# Patient Record
Sex: FEMALE | Race: WHITE | NOT HISPANIC OR LATINO | Employment: FULL TIME | URBAN - METROPOLITAN AREA
[De-identification: names, ages, dates, MRNs, and addresses within clinical notes are randomized per-mention and may not be internally consistent; named-entity substitution may affect disease eponyms.]

---

## 2017-03-30 RX ORDER — HYDROCHLOROTHIAZIDE 25 MG/1
25 TABLET ORAL
Qty: 90 TAB | Refills: 0 | Status: SHIPPED | OUTPATIENT
Start: 2017-03-30 | End: 2017-06-27 | Stop reason: SDUPTHER

## 2017-04-20 PROBLEM — K63.89 MELANOSIS COLI: Status: ACTIVE | Noted: 2017-04-20

## 2017-05-24 ENCOUNTER — OFFICE VISIT (OUTPATIENT)
Dept: MEDICAL GROUP | Facility: MEDICAL CENTER | Age: 57
End: 2017-05-24
Attending: NURSE PRACTITIONER
Payer: MEDICAID

## 2017-05-24 VITALS
DIASTOLIC BLOOD PRESSURE: 88 MMHG | RESPIRATION RATE: 20 BRPM | TEMPERATURE: 96.9 F | HEIGHT: 63 IN | OXYGEN SATURATION: 93 % | WEIGHT: 176 LBS | SYSTOLIC BLOOD PRESSURE: 124 MMHG | HEART RATE: 88 BPM | BODY MASS INDEX: 31.18 KG/M2

## 2017-05-24 DIAGNOSIS — I10 ESSENTIAL HYPERTENSION: ICD-10-CM

## 2017-05-24 DIAGNOSIS — M54.2 CHRONIC NECK PAIN: ICD-10-CM

## 2017-05-24 DIAGNOSIS — F17.200 TOBACCO DEPENDENCE: ICD-10-CM

## 2017-05-24 DIAGNOSIS — M85.80 OSTEOPENIA, UNSPECIFIED LOCATION: ICD-10-CM

## 2017-05-24 DIAGNOSIS — E66.9 OBESITY (BMI 30-39.9): ICD-10-CM

## 2017-05-24 DIAGNOSIS — G89.29 CHRONIC MIDLINE LOW BACK PAIN WITHOUT SCIATICA: ICD-10-CM

## 2017-05-24 DIAGNOSIS — Z13.220 NEED FOR LIPID SCREENING: ICD-10-CM

## 2017-05-24 DIAGNOSIS — R63.5 WEIGHT GAIN: ICD-10-CM

## 2017-05-24 DIAGNOSIS — G89.29 CHRONIC NECK PAIN: ICD-10-CM

## 2017-05-24 DIAGNOSIS — F31.60 BIPOLAR AFFECTIVE DISORDER, CURRENT EPISODE MIXED, CURRENT EPISODE SEVERITY UNSPECIFIED (HCC): ICD-10-CM

## 2017-05-24 DIAGNOSIS — M54.50 CHRONIC MIDLINE LOW BACK PAIN WITHOUT SCIATICA: ICD-10-CM

## 2017-05-24 PROCEDURE — 99213 OFFICE O/P EST LOW 20 MIN: CPT | Performed by: NURSE PRACTITIONER

## 2017-05-24 PROCEDURE — 99214 OFFICE O/P EST MOD 30 MIN: CPT | Performed by: NURSE PRACTITIONER

## 2017-05-24 ASSESSMENT — PAIN SCALES - GENERAL: PAINLEVEL: 5=MODERATE PAIN

## 2017-05-24 NOTE — ASSESSMENT & PLAN NOTE
She has chronic neck and low back pain and continues on Methadone per Dr. Melo. She states that her dose has been titrated down to 110 mg and she hopes to get back down to 100 mg daily. She states this is managing her pain well.

## 2017-05-24 NOTE — ASSESSMENT & PLAN NOTE
She has known bipolar and states that her mood has been stable. She continues to be managed by CHRISTINA Luciano and is seeing a therapist and doing groups at [facility]. She states her medications are unchanged.

## 2017-05-24 NOTE — ASSESSMENT & PLAN NOTE
She has known HTN. BP is controlled in the clinic today. The patient denies any episodes of CP, palpitations, SOB, lightheadedness/dizziness, headache or blurred vision. She continues on HCTZ daily without side effects.

## 2017-05-24 NOTE — MR AVS SNAPSHOT
"        Khalida Delaney   2017 9:30 AM   Office Visit   MRN: [id number]    [facility]   Dept Phone:  [phone]    Description:  Female : 1960   Provider:  TAJ PlascenciaPGLADYS           Reason for Visit     Weight Gain           Allergies as of 2017     Allergen Noted Reactions    Nkda [No Known Drug Allergy] 2010         You were diagnosed with     Bipolar affective disorder, current episode mixed, current episode severity unspecified (CMS-HCC)   [6729403]       Chronic neck pain   [448020]       Chronic midline low back pain without sciatica   [6764542]       Essential hypertension   [2487394]       Tobacco dependence   [104991]       Osteopenia, unspecified location   [9333557]       Weight gain   [627962]       Need for lipid screening   [929046]         Vital Signs     Blood Pressure Pulse Temperature Respirations Height Weight    124/88 mmHg 88 36.1 °C (96.9 °F) 20 1.6 m (5' 2.99\") 79.833 kg (176 lb)    Body Mass Index Oxygen Saturation Breastfeeding? Smoking Status          31.18 kg/m2 93% No Current Every Day Smoker        Basic Information     Date Of Birth Sex Race Ethnicity Preferred Language    1960 Female White Non- English      Problem List              ICD-10-CM Priority Class Noted - Resolved    Arthritis M19.90   Unknown - Present    Bipolar disorder (CMS-HCC) F31.9   Unknown - Present    Chronic neck pain M54.2, G89.29   Unknown - Present    Shoulder pain, right M25.511   Unknown - Present    Chronic low back pain M54.5, G89.29   Unknown - Present    Hep C w/o coma, chronic GENOTYPE 1A OR 1B B18.2   Unknown - Present    Insomnia G47.00   2012 - Present    Vitamin d deficiency    2012 - Present    Simple cyst of kidney right N28.1   2012 - Present    Osteopenia M85.80   2013 - Present    Dyslipidemia E78.5   2013 - Present    Lumbar burst fracture (CMS-HCC) S32.001A   2016 - Present    Opioid type dependence, " continuous (CMS-HCC) F11.20   5/19/2016 - Present    HTN (hypertension) I10   6/2/2016 - Present    Family history of breast cancer in sister Z80.3   11/2/2016 - Present    Tobacco dependence F17.200   11/2/2016 - Present    Melanosis coli K63.89   4/20/2017 - Present      Health Maintenance        Date Due Completion Dates    IMM PNEUMOCOCCAL 19-64 (ADULT) MEDIUM RISK SERIES (1 of 1 - PPSV23) 12/4/2017 (Originally 6/23/1978) ---    MAMMOGRAM 12/15/2017 12/15/2016, 10/16/2014, 9/23/2011    PAP SMEAR 11/2/2019 11/2/2016, 11/2/2016    IMM DTaP/Tdap/Td Vaccine (2 - Td) 12/11/2025 12/11/2015    COLONOSCOPY 4/20/2027 4/20/2017            Current Immunizations     Influenza Vaccine Quad Inj (Preserved) 11/2/2016, 4/11/2016  2:27 PM    Pneumococcal Vaccine (UF)Historical Data 8/6/2008    Tdap Vaccine 12/11/2015  9:15 AM      Below and/or attached are the medications your provider expects you to take. Review all of your home medications and newly ordered medications with your provider and/or pharmacist. Follow medication instructions as directed by your provider and/or pharmacist. Please keep your medication list with you and share with your provider. Update the information when medications are discontinued, doses are changed, or new medications (including over-the-counter products) are added; and carry medication information at all times in the event of emergency situations     Allergies:  NKDA - (reactions not documented)               Medications  Valid as of: May 24, 2017 - 10:01 AM    Generic Name Brand Name Tablet Size Instructions for use    B Complex Vitamins   Take 1 Tab by mouth every day at 6 PM.        Calcium Carbonate-Vitamin D (Tab) OSCAL 500 +D 500-200 MG-UNIT Take 1 Tab by mouth 2 times a day, with meals.        Cascara Sagrada (Cap) Cascara Sagrada 450 MG Take 1 Cap by mouth every day at 6 PM.        Cholecalciferol (Cap) Vitamin D 2000 UNITS Take 1 Cap by mouth every day at 6 PM.        ClonazePAM (Tab)  KLONOPIN 1 MG Take 0.5 mg by mouth 2 times a day.        HydroCHLOROthiazide (Tab) HYDRODIURIL 25 MG Take 1 Tab by mouth every day for 90 days. TAKE 1 TAB BY MOUTH EVERY DAY.        LamoTRIgine (Tab) LAMICTAL 150 MG Take 300 mg by mouth every bedtime.        Meloxicam (Tab) MOBIC 7.5 MG TAKE 1 TABLET BY MOUTH DAILY        Methadone HCl (Tab) DOLOPHINE 10 MG Take 5 Tabs by mouth 2 Times a Day.        Multiple Vitamins-Minerals   Take 1 Tab by mouth every day at 6 PM.        TiZANidine HCl (Cap) ZANAFLEX 4 MG TAKE 1 CAPSULE BY MOUTH TWICE A DAY AS NEEDED        Ziprasidone HCl (Cap) GEODON 80 MG         Zolpidem Tartrate (Tab) AMBIEN 10 MG         .                 Medicines prescribed today were sent to:     [facility]/PHARMACY #[id number] - [place], [place] - [address] [place] [address] AT [address]    [address] [place] [id number]    Phone: [phone] Fax: [phone]    Open 24 Hours?: No      Medication refill instructions:       If your prescription bottle indicates you have medication refills left, it is not necessary to call your provider’s office. Please contact your pharmacy and they will refill your medication.    If your prescription bottle indicates you do not have any refills left, you may request refills at any time through one of the following ways: The online [employer] system (except Urgent Care), by calling your provider’s office, or by asking your pharmacy to contact your provider’s office with a refill request. Medication refills are processed only during regular business hours and may not be available until the next business day. Your provider may request additional information or to have a follow-up visit with you prior to refilling your medication.   *Please Note: Medication refills are assigned a new Rx number when refilled electronically. Your pharmacy may indicate that no refills were authorized even though a new prescription for the same medication is available at the pharmacy. Please request the medicine  by name with the pharmacy before contacting your provider for a refill.        Your To Do List     Future Labs/Procedures Complete By Expires    CBC WITH DIFFERENTIAL  As directed 5/24/2018    COMP METABOLIC PANEL  As directed 5/24/2018    LIPID PROFILE  As directed 5/24/2018    TSH WITH REFLEX TO FT4  As directed 5/24/2018    VITAMIN D,25 HYDROXY  As directed 5/24/2018      Other Notes About Your Plan     Dr. Jones-neurosurgery  Dr. Lopez-ortho, wrist fracture  Dr. Nolan-[place] [facility]  Dr. Melo-methadone therapy           [employer] Access Code: [email or web address]  Expires: 6/23/2017 10:01 AM    [employer]  A secure, online tool to manage your health information     [employer]’s [employer]® is a secure, online tool that connects you to your personalized health information from the privacy of your home -- day or night - making it very easy for you to manage your healthcare. Once the activation process is completed, you can even access your medical information using the [employer] berenice, which is available for free in the [employer] Be[employer] store or Google Play store.     [employer] provides the following levels of access (as shown below):   My Chart Features   [facility] Primary Care Doctor [facility]  Specialists [facility]  Urgent  Care Non-[facility]  Primary Care  Doctor   Email your healthcare team securely and privately 24/7 X X X    Manage appointments: schedule your next appointment; view details of past/upcoming appointments X      Request prescription refills. X      View recent personal medical records, including lab and immunizations X X X X   View health record, including health history, allergies, medications X X X X   Read reports about your outpatient visits, procedures, consult and ER notes X X X X   See your discharge summary, which is a recap of your hospital and/or ER visit that includes your diagnosis, lab results, and care plan. X X       How to register for [employer]:  1. Go to  [email or web address].  2. Click on the Sign  Up Now box, which takes you to the New Member Sign Up page. You will need to provide the following information:  a. Enter your [employer] Access Code exactly as it appears at the top of this page. (You will not need to use this code after you’ve completed the sign-up process. If you do not sign up before the expiration date, you must request a new code.)   b. Enter your date of birth.   c. Enter your home email address.   d. Click Submit, and follow the next screen’s instructions.  3. Create a [employer] ID. This will be your [employer] login ID and cannot be changed, so think of one that is secure and easy to remember.  4. Create a [employer] password. You can change your password at any time.  5. Enter your Password Reset Question and Answer. This can be used at a later time if you forget your password.   6. Enter your e-mail address. This allows you to receive e-mail notifications when new information is available in [employer].  7. Click Sign Up. You can now view your health information.    For assistance activating your [employer] account, call [phone]        Quit Tobacco Information     Do you want to quit using tobacco?    Quitting tobacco decreases risks of cancer, heart and lung disease, increases life expectancy, improves sense of taste and smell, and increases spending money, among other benefits.    If you are thinking about quitting, we can help.  • [facility] Quit Tobacco Program: 215.583.3510  o Program occurs weekly for four weeks and includes pharmacist consultation on products to support quitting smoking or chewing tobacco. A provider referral is needed for pharmacist consultation.  • Tobacco Users Help Hotline: 0-800-QUIT-NOW (318-8832) or https://[place].quitlogix.org/  o Free, confidential telephone and online coaching for [place] residents. Sessions are designed on a schedule that is convenient for you. Eligible clients receive free nicotine replacement therapy.  • Nationally: www.smokefree.gov  o Information  and professional assistance to support both immediate and long-term needs as you become, and remain, a non-smoker. Smokefree.gov allows you to choose the help that best fits your needs.

## 2017-05-24 NOTE — PROGRESS NOTES
Subjective:     Chief Complaint   Patient presents with   • Weight Gain     Khalida Delaney is a 56 y.o. female here today for routine FU for multiple problems as listed below.      Bipolar disorder  She has known bipolar and states that her mood has been stable. She continues to be managed by CHRISTINA Luciano and is seeing a therapist and doing groups at [facility]. She states her medications are unchanged.     Chronic neck pain  She has chronic neck and low back pain and continues on Methadone per Dr. Melo. She states that her dose has been titrated down to 110 mg and she hopes to get back down to 100 mg daily. She states this is managing her pain well.     HTN (hypertension)  She has known HTN. BP is controlled in the clinic today. The patient denies any episodes of CP, palpitations, SOB, lightheadedness/dizziness, headache or blurred vision. She continues on HCTZ daily without side effects.      Osteopenia  She has known osteopenia and continues on Calcium and Vit D daily.              Current medicines (including changes today)  Current Outpatient Prescriptions   Medication Sig Dispense Refill   • hydrochlorothiazide (HYDRODIURIL) 25 MG Tab Take 1 Tab by mouth every day for 90 days. TAKE 1 TAB BY MOUTH EVERY DAY. 90 Tab 0   • tizanidine (ZANAFLEX) 4 MG capsule TAKE 1 CAPSULE BY MOUTH TWICE A DAY AS NEEDED 60 Cap 6   • meloxicam (MOBIC) 7.5 MG Tab TAKE 1 TABLET BY MOUTH DAILY 30 Tab 6   • calcium-vitamin D (OSCAL 500 +D) 500-200 MG-UNIT Tab Take 1 Tab by mouth 2 times a day, with meals. 60 Tab 6   • clonazepam (KLONOPIN) 1 MG Tab Take 0.5 mg by mouth 2 times a day.     • zolpidem (AMBIEN) 10 MG Tab      • ziprasidone (GEODON) 80 MG Cap   1   • Multiple Vitamins-Minerals (MULTIVITAMIN PO) Take 1 Tab by mouth every day at 6 PM.     • B Complex Vitamins (VITAMIN B COMPLEX PO) Take 1 Tab by mouth every day at 6 PM.     • Cholecalciferol (VITAMIN D) 2000 UNITS Cap Take 1 Cap by mouth every day at 6 PM.    "  • Cascara Sagrada 450 MG Cap Take 1 Cap by mouth every day at 6 PM.     • methadone (DOLOPHINE) 10 MG Tab Take 5 Tabs by mouth 2 Times a Day. (Patient taking differently: Take 55 mg by mouth 2 Times a Day.) 30 Tab 0   • lamotrigine (LAMICTAL) 150 MG tablet Take 300 mg by mouth every bedtime.       No current facility-administered medications for this visit.     She  has a past medical history of Bipolar 2 disorder; Chronic neck pain; Back pain; Arthritis; Bipolar disorder (CMS-HCC); Depression; Chronic neck pain; Shoulder pain, right; Chronic LBP; Hep C w/o coma, chronic (CMS-HCC); Simple cyst of kidney right (6/1/2012); HTN (hypertension) (6/2/2016); Tobacco dependence (11/2/2016); and Melanosis coli (4/20/2017). She also has no past medical history of Breast cancer (CMS-HCC).      Current medications, allergies and problems list reviewed and updated in EPIC.      ROS   Review of systems as documented above in history of present illness.         Objective:     Blood pressure 124/88, pulse 88, temperature 36.1 °C (96.9 °F), resp. rate 20, height 1.6 m (5' 2.99\"), weight 79.833 kg (176 lb), SpO2 93 %, not currently breastfeeding. Body mass index is 31.18 kg/(m^2).     Physical Exam:  Alert, oriented in no acute distress.  Eye contact is good, speech goal directed, affect calm  HEENT: conjunctiva non-injected, sclera non-icteric.   Oral mucous membranes pink and moist with no lesions.  Neck: Supple. Neck appears stiff.  Lungs: clear to auscultation bilaterally with good excursion.  CV: regular rate and rhythm.  Abdomen: soft  Ext: no edema  Skin: no rashes or lesions in visible areas.  MSK: Normal gait.       Assessment and Plan:   The following treatment plan was discussed     1. Bipolar affective disorder, current episode mixed, current episode severity unspecified (CMS-HCC)  Stable, continue on current management and FU with psychiatry.     2. Chronic neck pain  Chronic and unchanged. Her pain is managed on " Methadone by Dr. Melo.     3. Chronic midline low back pain without sciatica  As above, chronic and unchanged. Continue on current regimen.     4. Essential hypertension  Controlled, continue on current regimen.  Low salt diet.    COMP METABOLIC PANEL   5. Tobacco dependence  Cessation encouraged.    CBC WITH DIFFERENTIAL   6. Osteopenia, unspecified location  On calcium and Vit D daily, continue.  Smoking cessation encouraged.    VITAMIN D,25 HYDROXY   7. Weight gain  We talked about methods to assist in weight management to include eating 5 martha meals per day, increase water and fiber intake, and to avoid sugary beverages.  Exercise as tolerated, pool exercise recommended due to her chronic pain issues.    TSH WITH REFLEX TO FT4   8. Need for lipid screening  LIPID PROFILE   9. Obesity (BMI 30-39.9)  Patient identified as having weight management issue.  Appropriate orders and counseling given.       Followup: Return in about 6 months (around 11/24/2017), or if symptoms worsen or fail to improve.          Please note that this dictation was created using voice recognition software. Every reasonable attempt has been made to correct obvious errors, however there may be errors of grammar and possibly content that were not discovered before finalizing the note.

## 2017-06-06 ENCOUNTER — OFFICE VISIT (OUTPATIENT)
Dept: MEDICAL GROUP | Facility: MEDICAL CENTER | Age: 57
End: 2017-06-06
Attending: NURSE PRACTITIONER
Payer: MEDICAID

## 2017-06-06 VITALS
DIASTOLIC BLOOD PRESSURE: 74 MMHG | BODY MASS INDEX: 31.18 KG/M2 | SYSTOLIC BLOOD PRESSURE: 112 MMHG | WEIGHT: 176 LBS | RESPIRATION RATE: 20 BRPM | HEART RATE: 72 BPM | TEMPERATURE: 97.2 F | HEIGHT: 63 IN | OXYGEN SATURATION: 92 %

## 2017-06-06 DIAGNOSIS — M79.622 LEFT UPPER ARM PAIN: ICD-10-CM

## 2017-06-06 PROCEDURE — 99213 OFFICE O/P EST LOW 20 MIN: CPT | Performed by: NURSE PRACTITIONER

## 2017-06-06 PROCEDURE — 99214 OFFICE O/P EST MOD 30 MIN: CPT | Performed by: NURSE PRACTITIONER

## 2017-06-06 RX ORDER — IBUPROFEN 600 MG/1
600 TABLET ORAL EVERY 6 HOURS PRN
Qty: 45 TAB | Refills: 1 | Status: SHIPPED
Start: 2017-06-06 | End: 2017-07-11

## 2017-06-06 RX ORDER — METHYLPREDNISOLONE 4 MG/1
TABLET ORAL
Qty: 21 TAB | Refills: 0 | Status: SHIPPED
Start: 2017-06-06 | End: 2017-09-07

## 2017-06-06 ASSESSMENT — PAIN SCALES - GENERAL: PAINLEVEL: 10=SEVERE PAIN

## 2017-06-06 ASSESSMENT — PATIENT HEALTH QUESTIONNAIRE - PHQ9: CLINICAL INTERPRETATION OF PHQ2 SCORE: 0

## 2017-06-06 NOTE — MR AVS SNAPSHOT
"        Khalida Delaney   2017 10:30 AM   Office Visit   MRN: [id number]    [facility]   Dept Phone:  [phone]    Description:  Female : 1960   Provider:  SIXTO Plascencia           Reason for Visit     Arm Pain L,pt.states starts at the top and radiates down x 1 week      Allergies as of 2017     Allergen Noted Reactions    Nkda [No Known Drug Allergy] 2010         You were diagnosed with     Left upper arm pain   [534358]         Vital Signs     Blood Pressure Pulse Temperature Respirations Height Weight    112/74 mmHg 72 36.2 °C (97.2 °F) 20 1.6 m (5' 2.99\") 79.833 kg (176 lb)    Body Mass Index Oxygen Saturation Breastfeeding? Smoking Status          31.18 kg/m2 92% No Current Every Day Smoker        Basic Information     Date Of Birth Sex Race Ethnicity Preferred Language    1960 Female White Non- English      Problem List              ICD-10-CM Priority Class Noted - Resolved    Arthritis M19.90   Unknown - Present    Bipolar disorder (CMS-HCC) F31.9   Unknown - Present    Chronic neck pain M54.2, G89.29   Unknown - Present    Shoulder pain, right M25.511   Unknown - Present    Chronic low back pain M54.5, G89.29   Unknown - Present    Hep C w/o coma, chronic GENOTYPE 1A OR 1B B18.2   Unknown - Present    Insomnia G47.00   2012 - Present    Vitamin d deficiency    2012 - Present    Simple cyst of kidney right N28.1   2012 - Present    Osteopenia M85.80   2013 - Present    Dyslipidemia E78.5   2013 - Present    Lumbar burst fracture (CMS-HCC) S32.001A   2016 - Present    Opioid type dependence, continuous (CMS-HCC) F11.20   2016 - Present    HTN (hypertension) I10   2016 - Present    Family history of breast cancer in sister Z80.3   2016 - Present    Tobacco dependence F17.200   2016 - Present    Melanosis coli K63.89   2017 - Present    Obesity (BMI 30-39.9) E66.9   2017 - Present      "   Health Maintenance        Date Due Completion Dates    IMM PNEUMOCOCCAL 19-64 (ADULT) MEDIUM RISK SERIES (1 of 1 - PPSV23) 12/4/2017 (Originally 6/23/1978) ---    MAMMOGRAM 12/15/2017 12/15/2016, 10/16/2014, 9/23/2011    PAP SMEAR 11/2/2019 11/2/2016, 11/2/2016    IMM DTaP/Tdap/Td Vaccine (2 - Td) 12/11/2025 12/11/2015    COLONOSCOPY 4/20/2027 4/20/2017            Current Immunizations     Influenza Vaccine Quad Inj (Preserved) 11/2/2016, 4/11/2016  2:27 PM    Pneumococcal Vaccine (UF)Historical Data 8/6/2008    Tdap Vaccine 12/11/2015  9:15 AM      Below and/or attached are the medications your provider expects you to take. Review all of your home medications and newly ordered medications with your provider and/or pharmacist. Follow medication instructions as directed by your provider and/or pharmacist. Please keep your medication list with you and share with your provider. Update the information when medications are discontinued, doses are changed, or new medications (including over-the-counter products) are added; and carry medication information at all times in the event of emergency situations     Allergies:  NKDA - (reactions not documented)               Medications  Valid as of: June 06, 2017 - 11:49 AM    Generic Name Brand Name Tablet Size Instructions for use    B Complex Vitamins   Take 1 Tab by mouth every day at 6 PM.        Calcium Carbonate-Vitamin D (Tab) OSCAL 500 +D 500-200 MG-UNIT Take 1 Tab by mouth 2 times a day, with meals.        Cascara Sagrada (Cap) Cascara Sagrada 450 MG Take 1 Cap by mouth every day at 6 PM.        Cholecalciferol (Cap) Vitamin D 2000 UNITS Take 1 Cap by mouth every day at 6 PM.        ClonazePAM (Tab) KLONOPIN 1 MG Take 0.5 mg by mouth 2 times a day.        HydroCHLOROthiazide (Tab) HYDRODIURIL 25 MG Take 1 Tab by mouth every day for 90 days. TAKE 1 TAB BY MOUTH EVERY DAY.        Ibuprofen (Tab) MOTRIN 600 MG Take 1 Tab by mouth every 6 hours as needed for Moderate  Pain or Inflammation.        LamoTRIgine (Tab) LAMICTAL 150 MG Take 300 mg by mouth every bedtime.        Meloxicam (Tab) MOBIC 7.5 MG TAKE 1 TABLET BY MOUTH DAILY        Methadone HCl (Tab) DOLOPHINE 10 MG Take 5 Tabs by mouth 2 Times a Day.        MethylPREDNISolone (Tablet Therapy Pack) MEDROL DOSEPAK 4 MG Follow package instructions.        Multiple Vitamins-Minerals   Take 1 Tab by mouth every day at 6 PM.        TiZANidine HCl (Cap) ZANAFLEX 4 MG TAKE 1 CAPSULE BY MOUTH TWICE A DAY AS NEEDED        Ziprasidone HCl (Cap) GEODON 80 MG         Zolpidem Tartrate (Tab) AMBIEN 10 MG         .                 Medicines prescribed today were sent to:     [facility]/PHARMACY #[id number] - [place], [place] - [address]    [address], [place] [address]    Phone: [phone] Fax: [phone]    Open 24 Hours?: No      Medication refill instructions:       If your prescription bottle indicates you have medication refills left, it is not necessary to call your provider’s office. Please contact your pharmacy and they will refill your medication.    If your prescription bottle indicates you do not have any refills left, you may request refills at any time through one of the following ways: The online [employer] system (except Urgent Care), by calling your provider’s office, or by asking your pharmacy to contact your provider’s office with a refill request. Medication refills are processed only during regular business hours and may not be available until the next business day. Your provider may request additional information or to have a follow-up visit with you prior to refilling your medication.   *Please Note: Medication refills are assigned a new Rx number when refilled electronically. Your pharmacy may indicate that no refills were authorized even though a new prescription for the same medication is available at the pharmacy. Please request the medicine by name with the pharmacy before contacting your  provider for a refill.        Other Notes About Your Plan     Dr. Jones-neurosurgery  Dr. Lopez-ortho, wrist fracture  Dr. Nolan-[place] [facility]  Dr. Melo-methadone therapy           [employer] Access Code: [email or web address]  Expires: 6/23/2017 10:01 AM    [employer]  A secure, online tool to manage your health information     [employer]® is a secure, online tool that connects you to your personalized health information from the privacy of your home -- day or night - making it very easy for you to manage your healthcare. Once the activation process is completed, you can even access your medical information using the [employer] berenice, which is available for free in the Apple Berenice store or Google Play store.     [employer] provides the following levels of access (as shown below):   My Chart Features   [facility] Doctor [facility]  Urgent  Care Non-[facility]  Primary Care  Doctor   Email your healthcare team securely and privately 24/7 X X X    Manage appointments: schedule your next appointment; view details of past/upcoming appointments X      Request prescription refills. X      View recent personal medical records, including lab and immunizations X X X X   View health record, including health history, allergies, medications X X X X   Read reports about your outpatient visits, procedures, consult and ER notes X X X X   See your discharge summary, which is a recap of your hospital and/or ER visit that includes your diagnosis, lab results, and care plan. X X       How to register for [employer]  1. Go to  [email or web address].  2. Click on the Sign Up Now box, which takes you to the New Member Sign Up page. You will need to provide the following information:  a. Enter your [employer] Access Code exactly as it appears at the top of this page. (You will not need to use this code after you’ve completed the sign-up process. If you do not sign up before the expiration date, you must request a new  code.)   b. Enter your date of birth.   c. Enter your home email address.   d. Click Submit, and follow the next screen’s instructions.  3. Create a [employer] ID. This will be your [employer] login ID and cannot be changed, so think of one that is secure and easy to remember.  4. Create a [employer] password. You can change your password at any time.  5. Enter your Password Reset Question and Answer. This can be used at a later time if you forget your password.   6. Enter your e-mail address. This allows you to receive e-mail notifications when new information is available in [employer].  7. Click Sign Up. You can now view your health information.    For assistance activating your [employer] account, call [phone]        Quit Tobacco Information     Do you want to quit using tobacco?    Quitting tobacco decreases risks of cancer, heart and lung disease, increases life expectancy, improves sense of taste and smell, and increases spending money, among other benefits.    If you are thinking about quitting, we can help.  • [facility] Quit Tobacco Program: 952.962.4795  o Program occurs weekly for four weeks and includes pharmacist consultation on products to support quitting smoking or chewing tobacco. A provider referral is needed for pharmacist consultation.  • Tobacco Users Help Hotline: 4-165-QUITNOW (197-3480) or https://[place].quitlogix.org/  o Free, confidential telephone and online coaching for [place] residents. Sessions are designed on a schedule that is convenient for you. Eligible clients receive free nicotine replacement therapy.  • Nationally: www.smokefree.gov  o Information and professional assistance to support both immediate and long-term needs as you become, and remain, a non-smoker. Smokefree.gov allows you to choose the help that best fits your needs.

## 2017-06-06 NOTE — PATIENT INSTRUCTIONS
Please take all medications as instructed.  If you have any adverse reactions, please report them immediately.  Please follow all instructions that were discussed in the visit.  Please complete any ordered blood work prior to next visit.  Please follow up with any referrals made

## 2017-06-06 NOTE — PROGRESS NOTES
"Subjective:     Chief Complaint   Patient presents with   • Arm Pain     L,pt.states starts at the top and radiates down x 1 week       Khalida Delaney is a 56 y.o. female here today for left arm pain.       The patient reports 1 week ago she was in a water aerobics class and injured her left arm. She states that she was doing exercises where she was reaching across her body and did not have pain immediately but noticed pain starting in her upper arm on the way home that has been worsening. She states the pain radiates down the front of the arm and the side of her arm. She states her pain is aggravated by moving her arm across her chest, or raising it overhead. She has been using ice and heat without relief. She has been taking Ibuprofen 200 mg 4 times daily with little relief. She is on chronic Methadone therapy and states this is \"not touching the pain.\"         Current medicines (including changes today)  Current Outpatient Prescriptions   Medication Sig Dispense Refill   • MethylPREDNISolone (MEDROL DOSEPAK) 4 MG Tablet Therapy Pack Follow package instructions. 21 Tab 0   • ibuprofen (MOTRIN) 600 MG Tab Take 1 Tab by mouth every 6 hours as needed for Moderate Pain or Inflammation. 45 Tab 1   • hydrochlorothiazide (HYDRODIURIL) 25 MG Tab Take 1 Tab by mouth every day for 90 days. TAKE 1 TAB BY MOUTH EVERY DAY. 90 Tab 0   • tizanidine (ZANAFLEX) 4 MG capsule TAKE 1 CAPSULE BY MOUTH TWICE A DAY AS NEEDED 60 Cap 6   • meloxicam (MOBIC) 7.5 MG Tab TAKE 1 TABLET BY MOUTH DAILY 30 Tab 6   • calcium-vitamin D (OSCAL 500 +D) 500-200 MG-UNIT Tab Take 1 Tab by mouth 2 times a day, with meals. 60 Tab 6   • clonazepam (KLONOPIN) 1 MG Tab Take 0.5 mg by mouth 2 times a day.     • zolpidem (AMBIEN) 10 MG Tab      • ziprasidone (GEODON) 80 MG Cap   1   • Multiple Vitamins-Minerals (MULTIVITAMIN PO) Take 1 Tab by mouth every day at 6 PM.     • B Complex Vitamins (VITAMIN B COMPLEX PO) Take 1 Tab by mouth every day at 6 PM.   " "  • Cholecalciferol (VITAMIN D) 2000 UNITS Cap Take 1 Cap by mouth every day at 6 PM.     • Cascara Sagrada 450 MG Cap Take 1 Cap by mouth every day at 6 PM.     • methadone (DOLOPHINE) 10 MG Tab Take 5 Tabs by mouth 2 Times a Day. (Patient taking differently: Take 55 mg by mouth 2 Times a Day.) 30 Tab 0   • lamotrigine (LAMICTAL) 150 MG tablet Take 300 mg by mouth every bedtime.       No current facility-administered medications for this visit.     She  has a past medical history of Bipolar 2 disorder; Chronic neck pain; Back pain; Arthritis; Bipolar disorder (CMS-HCC); Depression; Chronic neck pain; Shoulder pain, right; Chronic LBP; Hep C w/o coma, chronic (CMS-HCC); Simple cyst of kidney right (6/1/2012); HTN (hypertension) (6/2/2016); Tobacco dependence (11/2/2016); and Melanosis coli (4/20/2017). She also has no past medical history of Breast cancer (CMS-HCC).      Current medications, allergies and problems list reviewed and updated in [employer].      ROS   Review of systems as documented above in history of present illness.         Objective:     Blood pressure 112/74, pulse 72, temperature 36.2 °C (97.2 °F), resp. rate 20, height 1.6 m (5' 2.99\"), weight 79.833 kg (176 lb), SpO2 92 %, not currently breastfeeding. Body mass index is 31.18 kg/(m^2).     Physical Exam:  Alert, oriented in no acute distress.  Eye contact is good, speech goal directed, affect calm  HEENT: conjunctiva non-injected, sclera non-icteric.  Oral mucous membranes pink and moist with no lesions.  Neck: Supple.  Lungs: clear to auscultation bilaterally with good excursion.  CV: regular rate and rhythm.  Abdomen: soft  Ext: no edema  Skin: no rashes or lesions in visible areas.  MSK: ROM in left arm is significantly limited. +tenderness with any passive ROM. No swelling or deformity is noted.      Assessment and Plan:   The following treatment plan was discussed     1. Left upper arm pain  She presents with left arm pain for nearly a week from " water aerobics, there was no specific injury or trauma.  Start medrol dose pack.  After completion, start Motrin 600 mg 3-4 times daily prn.  Stop meloxicam while taking these meds.  Continue with supportive care to include ice application.  FU if symptoms not improving.    MethylPREDNISolone (MEDROL DOSEPAK) 4 MG Tablet Therapy Pack    ibuprofen (MOTRIN) 600 MG Tab       Followup: Return if symptoms worsen or fail to improve.          Please note that this dictation was created using voice recognition software. Every reasonable attempt has been made to correct obvious errors, however there may be errors of grammar and possibly content that were not discovered before finalizing the note.

## 2017-06-27 RX ORDER — HYDROCHLOROTHIAZIDE 25 MG/1
25 TABLET ORAL DAILY
Qty: 90 TAB | Refills: 0 | Status: SHIPPED | OUTPATIENT
Start: 2017-06-27 | End: 2017-09-23 | Stop reason: SDUPTHER

## 2017-07-05 RX ORDER — TIZANIDINE HYDROCHLORIDE 4 MG/1
CAPSULE, GELATIN COATED ORAL
Qty: 60 CAP | Refills: 3 | Status: SHIPPED | OUTPATIENT
Start: 2017-07-05 | End: 2018-01-18

## 2017-07-11 RX ORDER — MELOXICAM 7.5 MG/1
TABLET ORAL
Qty: 30 TAB | Refills: 6 | Status: SHIPPED | OUTPATIENT
Start: 2017-07-11 | End: 2018-12-17

## 2017-07-21 ENCOUNTER — HOSPITAL ENCOUNTER (OUTPATIENT)
Dept: LAB | Facility: MEDICAL CENTER | Age: 57
End: 2017-07-21
Attending: NURSE PRACTITIONER
Payer: MEDICAID

## 2017-07-21 DIAGNOSIS — M85.80 OSTEOPENIA, UNSPECIFIED LOCATION: ICD-10-CM

## 2017-07-21 DIAGNOSIS — R63.5 WEIGHT GAIN: ICD-10-CM

## 2017-07-21 DIAGNOSIS — F17.200 TOBACCO DEPENDENCE: ICD-10-CM

## 2017-07-21 DIAGNOSIS — I10 ESSENTIAL HYPERTENSION: ICD-10-CM

## 2017-07-21 DIAGNOSIS — Z13.220 NEED FOR LIPID SCREENING: ICD-10-CM

## 2017-07-21 LAB — GFR SERPL CREATININE-BSD FRML MDRD: >60 ML/MIN/1.73 M 2

## 2017-07-21 PROCEDURE — 82306 VITAMIN D 25 HYDROXY: CPT

## 2017-07-21 PROCEDURE — 36415 COLL VENOUS BLD VENIPUNCTURE: CPT

## 2017-07-21 PROCEDURE — 80053 COMPREHEN METABOLIC PANEL: CPT

## 2017-07-21 PROCEDURE — 80061 LIPID PANEL: CPT

## 2017-07-21 PROCEDURE — 84443 ASSAY THYROID STIM HORMONE: CPT

## 2017-07-21 PROCEDURE — 84439 ASSAY OF FREE THYROXINE: CPT

## 2017-07-21 PROCEDURE — 85025 COMPLETE CBC W/AUTO DIFF WBC: CPT

## 2017-07-22 LAB
25(OH)D3 SERPL-MCNC: 34 NG/ML (ref 30–100)
ALBUMIN SERPL BCP-MCNC: 4.5 G/DL (ref 3.2–4.9)
ALBUMIN/GLOB SERPL: 1.4 G/DL
ALP SERPL-CCNC: 68 U/L (ref 30–99)
ALT SERPL-CCNC: 41 U/L (ref 2–50)
ANION GAP SERPL CALC-SCNC: 13 MMOL/L (ref 0–11.9)
AST SERPL-CCNC: 54 U/L (ref 12–45)
BASOPHILS # BLD AUTO: 0.6 % (ref 0–1.8)
BASOPHILS # BLD: 0.05 K/UL (ref 0–0.12)
BILIRUB SERPL-MCNC: 0.9 MG/DL (ref 0.1–1.5)
BUN SERPL-MCNC: 14 MG/DL (ref 8–22)
CALCIUM SERPL-MCNC: 10.4 MG/DL (ref 8.5–10.5)
CHLORIDE SERPL-SCNC: 103 MMOL/L (ref 96–112)
CHOLEST SERPL-MCNC: 179 MG/DL (ref 100–199)
CO2 SERPL-SCNC: 20 MMOL/L (ref 20–33)
CREAT SERPL-MCNC: 0.92 MG/DL (ref 0.5–1.4)
EOSINOPHIL # BLD AUTO: 0.03 K/UL (ref 0–0.51)
EOSINOPHIL NFR BLD: 0.4 % (ref 0–6.9)
ERYTHROCYTE [DISTWIDTH] IN BLOOD BY AUTOMATED COUNT: 43.2 FL (ref 35.9–50)
GLOBULIN SER CALC-MCNC: 3.3 G/DL (ref 1.9–3.5)
GLUCOSE SERPL-MCNC: 95 MG/DL (ref 65–99)
HCT VFR BLD AUTO: 47.7 % (ref 37–47)
HDLC SERPL-MCNC: 66 MG/DL
HGB BLD-MCNC: 16.5 G/DL (ref 12–16)
IMM GRANULOCYTES # BLD AUTO: 0.04 K/UL (ref 0–0.11)
IMM GRANULOCYTES NFR BLD AUTO: 0.5 % (ref 0–0.9)
LDLC SERPL CALC-MCNC: 97 MG/DL
LYMPHOCYTES # BLD AUTO: 2.68 K/UL (ref 1–4.8)
LYMPHOCYTES NFR BLD: 32.7 % (ref 22–41)
MCH RBC QN AUTO: 31.3 PG (ref 27–33)
MCHC RBC AUTO-ENTMCNC: 34.6 G/DL (ref 33.6–35)
MCV RBC AUTO: 90.5 FL (ref 81.4–97.8)
MONOCYTES # BLD AUTO: 0.51 K/UL (ref 0–0.85)
MONOCYTES NFR BLD AUTO: 6.2 % (ref 0–13.4)
NEUTROPHILS # BLD AUTO: 4.89 K/UL (ref 2–7.15)
NEUTROPHILS NFR BLD: 59.6 % (ref 44–72)
NRBC # BLD AUTO: 0 K/UL
NRBC BLD AUTO-RTO: 0 /100 WBC
PLATELET # BLD AUTO: 271 K/UL (ref 164–446)
PMV BLD AUTO: 10.8 FL (ref 9–12.9)
POTASSIUM SERPL-SCNC: 3.5 MMOL/L (ref 3.6–5.5)
PROT SERPL-MCNC: 7.8 G/DL (ref 6–8.2)
RBC # BLD AUTO: 5.27 M/UL (ref 4.2–5.4)
SODIUM SERPL-SCNC: 136 MMOL/L (ref 135–145)
T4 FREE SERPL-MCNC: 0.75 NG/DL (ref 0.53–1.43)
TRIGL SERPL-MCNC: 79 MG/DL (ref 0–149)
TSH SERPL DL<=0.005 MIU/L-ACNC: 3.89 UIU/ML (ref 0.3–3.7)
WBC # BLD AUTO: 8.2 K/UL (ref 4.8–10.8)

## 2017-09-07 ENCOUNTER — OFFICE VISIT (OUTPATIENT)
Dept: MEDICAL GROUP | Facility: MEDICAL CENTER | Age: 57
End: 2017-09-07
Attending: NURSE PRACTITIONER
Payer: MEDICAID

## 2017-09-07 VITALS
TEMPERATURE: 97.3 F | RESPIRATION RATE: 20 BRPM | BODY MASS INDEX: 28.35 KG/M2 | DIASTOLIC BLOOD PRESSURE: 62 MMHG | OXYGEN SATURATION: 92 % | HEART RATE: 72 BPM | SYSTOLIC BLOOD PRESSURE: 104 MMHG | HEIGHT: 63 IN | WEIGHT: 160 LBS

## 2017-09-07 DIAGNOSIS — I10 ESSENTIAL HYPERTENSION: ICD-10-CM

## 2017-09-07 DIAGNOSIS — F31.60 BIPOLAR AFFECTIVE DISORDER, CURRENT EPISODE MIXED, CURRENT EPISODE SEVERITY UNSPECIFIED (HCC): ICD-10-CM

## 2017-09-07 DIAGNOSIS — M54.50 CHRONIC MIDLINE LOW BACK PAIN WITHOUT SCIATICA: ICD-10-CM

## 2017-09-07 DIAGNOSIS — E66.3 OVERWEIGHT (BMI 25.0-29.9): ICD-10-CM

## 2017-09-07 DIAGNOSIS — E78.5 DYSLIPIDEMIA: ICD-10-CM

## 2017-09-07 DIAGNOSIS — G89.29 CHRONIC MIDLINE LOW BACK PAIN WITHOUT SCIATICA: ICD-10-CM

## 2017-09-07 PROBLEM — E66.9 OBESITY (BMI 30-39.9): Status: RESOLVED | Noted: 2017-05-24 | Resolved: 2017-09-07

## 2017-09-07 PROCEDURE — 99214 OFFICE O/P EST MOD 30 MIN: CPT | Performed by: NURSE PRACTITIONER

## 2017-09-07 ASSESSMENT — PAIN SCALES - GENERAL: PAINLEVEL: 5=MODERATE PAIN

## 2017-09-07 NOTE — ASSESSMENT & PLAN NOTE
She has known HTN. BP is controlled in the clinic today. The patient denies any episodes of CP, palpitations, lightheadedness/dizziness, headache or blurred vision. She has been compliant with the HCTZ.

## 2017-09-07 NOTE — ASSESSMENT & PLAN NOTE
She has been working really hard to lose weight by walking and going to the gym. She has been walking 5 miles per day and is going to the gym 3 days per week and doing water aerobics. She has also cut out sugary beverages. She has lost 16 lbs since her last appt. She notes that yesterday she fell at the park and banged her right knee on a rock. She has been able to weight bear and walked 2 miles this morning. She states that her knee feels bruised and is tender.

## 2017-09-07 NOTE — ASSESSMENT & PLAN NOTE
She has known bipolar disorder and continues under the management of CHRISTINA Saucedo. She states that she feels mentally better since she started exercising, she notes both her depression and anxiety have improved. She continues on her usual medication regimen.

## 2017-09-07 NOTE — PROGRESS NOTES
Subjective:     Chief Complaint   Patient presents with   • Results     labs     Khalida Delaney is a 57 y.o. female here today for routine FU for multiple problems as listed below and to review her labs.      Overweight (BMI 25.0-29.9)  She has been working really hard to lose weight by walking and going to the gym. She has been walking 5 miles per day and is going to the gym 3 days per week and doing water aerobics. She has also cut out sugary beverages. She has lost 16 lbs since her last appt. She notes that yesterday she fell at the park and banged her right knee on a rock. She has been able to weight bear and walked 2 miles this morning. She states that her knee feels bruised and is tender.     HTN (hypertension)  She has known HTN. BP is controlled in the clinic today. The patient denies any episodes of CP, palpitations, lightheadedness/dizziness, headache or blurred vision. She has been compliant with the HCTZ.      Chronic low back pain  She has chronic low back pain and states this is improving with her walking. She is considering having her Methadone dose decreased by Dr. Melo.    Bipolar disorder  She has known bipolar disorder and continues under the management of CHRISTINA Saucedo. She states that she feels mentally better since she started exercising, she notes both her depression and anxiety have improved. She continues on her usual medication regimen.           Current medicines (including changes today)  Current Outpatient Prescriptions   Medication Sig Dispense Refill   • meloxicam (MOBIC) 7.5 MG Tab TAKE 1 TABLET BY MOUTH DAILY 30 Tab 6   • tizanidine (ZANAFLEX) 4 MG capsule TAKE 1 CAPSULE BY MOUTH TWICE A DAY AS NEEDED 60 Cap 3   • hydrochlorothiazide (HYDRODIURIL) 25 MG Tab Take 1 Tab by mouth every day. 90 Tab 0   • calcium-vitamin D (OSCAL 500 +D) 500-200 MG-UNIT Tab Take 1 Tab by mouth 2 times a day, with meals. 60 Tab 6   • clonazepam (KLONOPIN) 1 MG Tab Take 0.5 mg by mouth  "2 times a day.     • zolpidem (AMBIEN) 10 MG Tab      • ziprasidone (GEODON) 80 MG Cap   1   • Multiple Vitamins-Minerals (MULTIVITAMIN PO) Take 1 Tab by mouth every day at 6 PM.     • B Complex Vitamins (VITAMIN B COMPLEX PO) Take 1 Tab by mouth every day at 6 PM.     • Cholecalciferol (VITAMIN D) 2000 UNITS Cap Take 1 Cap by mouth every day at 6 PM.     • Cascara Sagrada 450 MG Cap Take 1 Cap by mouth every day at 6 PM.     • methadone (DOLOPHINE) 10 MG Tab Take 5 Tabs by mouth 2 Times a Day. (Patient taking differently: Take 55 mg by mouth 2 Times a Day.) 30 Tab 0   • lamotrigine (LAMICTAL) 150 MG tablet Take 300 mg by mouth every bedtime.       No current facility-administered medications for this visit.      She  has a past medical history of Arthritis; Back pain; Bipolar 2 disorder; Bipolar disorder (CMS-HCC); Chronic LBP; Chronic neck pain; Chronic neck pain; Depression; Hep C w/o coma, chronic (CMS-HCC); HTN (hypertension) (6/2/2016); Melanosis coli (4/20/2017); Shoulder pain, right; Simple cyst of kidney right (6/1/2012); and Tobacco dependence (11/2/2016). She also has no past medical history of Breast cancer (CMS-HCC).      Current medications, allergies and problems list reviewed and updated in [facility].        ROS  Review of systems as documented above in history of present illness.         Objective:     Blood pressure 104/62, pulse 72, temperature 36.3 °C (97.3 °F), resp. rate 20, height 1.6 m (5' 2.99\"), weight 72.6 kg (160 lb), SpO2 92 %, not currently breastfeeding. Body mass index is 28.35 kg/m².     Physical Exam:    Alert, oriented in no acute distress.  Eye contact is good, speech goal directed, affect flat.  HEENT: conjunctiva non-injected, sclera non-icteric.  Oral mucous membranes pink and moist with no lesions.  Neck: Supple.  Lungs: clear to auscultation bilaterally with good excursion.  CV: regular rate and rhythm.  Abdomen: soft  Ext: no edema  Skin: no rashes or lesions in visible " areas.  MSK: Normal gait. Her right knee has some abrasions and mild swelling to the lateral side.      Assessment and Plan:   The following treatment plan was discussed     1. Overweight (BMI 25.0-29.9)  She has been successful in losing weight. Continue with current exercise plan.     2. Essential hypertension  BP is controlled. Continue on HCTZ.     3. Chronic midline low back pain without sciatica  She states her back pain is improved with her walking and weight loss.   I have encouraged her to discuss cutting back her Methadone dose with Dr. Melo.     4. Dyslipidemia  Her lipids have improved, continue with weight loss efforts..     5. Bipolar affective disorder, current episode mixed, current episode severity unspecified (CMS-HCC)  Stable, continue with treatment and FU per psychiatry.         She declined the flu vaccine today.      Followup: Return in about 6 months (around 3/7/2018), or if symptoms worsen or fail to improve.          Please note that this dictation was created using voice recognition software. Every reasonable attempt has been made to correct obvious errors, however there may be errors of grammar and possibly content that were not discovered before finalizing the note.

## 2017-09-07 NOTE — ASSESSMENT & PLAN NOTE
She has chronic low back pain and states this is improving with her walking. She is considering having her Methadone dose decreased by Dr. Melo.

## 2017-09-26 RX ORDER — HYDROCHLOROTHIAZIDE 25 MG/1
TABLET ORAL
Qty: 90 TAB | Refills: 1 | Status: SHIPPED | OUTPATIENT
Start: 2017-09-26 | End: 2018-01-17

## 2018-01-17 ENCOUNTER — HOSPITAL ENCOUNTER (EMERGENCY)
Facility: MEDICAL CENTER | Age: 58
End: 2018-01-17
Attending: EMERGENCY MEDICINE
Payer: MEDICAID

## 2018-01-17 ENCOUNTER — APPOINTMENT (OUTPATIENT)
Dept: RADIOLOGY | Facility: MEDICAL CENTER | Age: 58
End: 2018-01-17
Attending: EMERGENCY MEDICINE
Payer: MEDICAID

## 2018-01-17 VITALS
RESPIRATION RATE: 18 BRPM | TEMPERATURE: 97.1 F | WEIGHT: 164.68 LBS | DIASTOLIC BLOOD PRESSURE: 67 MMHG | SYSTOLIC BLOOD PRESSURE: 117 MMHG | HEART RATE: 54 BPM | OXYGEN SATURATION: 88 % | BODY MASS INDEX: 29.18 KG/M2 | HEIGHT: 63 IN

## 2018-01-17 DIAGNOSIS — S22.32XA CLOSED FRACTURE OF ONE RIB OF LEFT SIDE, INITIAL ENCOUNTER: ICD-10-CM

## 2018-01-17 PROCEDURE — 71101 X-RAY EXAM UNILAT RIBS/CHEST: CPT | Mod: LT

## 2018-01-17 PROCEDURE — 99284 EMERGENCY DEPT VISIT MOD MDM: CPT

## 2018-01-17 RX ORDER — HYDROCODONE BITARTRATE AND ACETAMINOPHEN 5; 325 MG/1; MG/1
1 TABLET ORAL ONCE
Status: DISCONTINUED | OUTPATIENT
Start: 2018-01-17 | End: 2018-01-17 | Stop reason: HOSPADM

## 2018-01-17 RX ORDER — HYDROCODONE BITARTRATE AND ACETAMINOPHEN 5; 325 MG/1; MG/1
1-2 TABLET ORAL EVERY 6 HOURS PRN
Qty: 15 TAB | Refills: 0 | Status: SHIPPED | OUTPATIENT
Start: 2018-01-17 | End: 2018-01-19

## 2018-01-17 RX ORDER — ALPRAZOLAM 1 MG/1
1 TABLET ORAL 3 TIMES DAILY PRN
COMMUNITY
End: 2020-03-25

## 2018-01-17 ASSESSMENT — PAIN SCALES - GENERAL: PAINLEVEL_OUTOF10: 7

## 2018-01-17 NOTE — ED NOTES
Pt ambulates slowly to triage   Chief Complaint   Patient presents with   • GLF     slipped on a sock on wood floor and fell into shelving   • Rib Injury   • Flank Pain     L sided since fall   GLF 2 nights ago, denies hitting head  Pt asked to wait in lobby, pt updated on triage process and pt asked to inform RN of any changes.

## 2018-01-17 NOTE — DISCHARGE INSTRUCTIONS
Rib Fracture  A rib fracture is a break or crack in one of the bones of the ribs. The ribs are like a cage that goes around your upper chest. A broken or cracked rib is often painful, but most do not cause other problems. Most rib fractures heal on their own in 1-3 months.  HOME CARE  · Avoid activities that cause pain to the injured area. Protect your injured area.  · Slowly increase activity as told by your doctor.  · Take medicine as told by your doctor.  · Put ice on the injured area for the first 1-2 days after you have been treated or as told by your doctor.  ¨ Put ice in a plastic bag.  ¨ Place a towel between your skin and the bag.  ¨ Leave the ice on for 15-20 minutes at a time, every 2 hours while you are awake.  · Do deep breathing as told by your doctor. You may be told to:  ¨ Take deep breaths many times a day.  ¨ Cough many times a day while hugging a pillow.  ¨ Use a device (incentive spirometer) to perform deep breathing many times a day.  · Drink enough fluids to keep your pee (urine) clear or pale yellow.    · Do not wear a rib belt or binder. These do not allow you to breathe deeply.  GET HELP RIGHT AWAY IF:   · You have a fever.  · You have trouble breathing.    · You cannot stop coughing.  · You cough up thick or bloody spit (mucus).    · You feel sick to your stomach (nauseous), throw up (vomit), or have belly (abdominal) pain.    · Your pain gets worse and medicine does not help.    MAKE SURE YOU:   · Understand these instructions.  · Will watch your condition.  · Will get help right away if you are not doing well or get worse.     This information is not intended to replace advice given to you by your health care provider. Make sure you discuss any questions you have with your health care provider.     Document Released: 09/26/2009 Document Revised: 04/14/2014 Document Reviewed: 02/19/2014  [employer] Interactive Patient Education ©2016 [employer]

## 2018-01-17 NOTE — ED NOTES
Pt roomed on [facility] in w[place], agree with triage note, Pt has contusion to left lateral chest and lateral LUQ pain that increases with movement and palpation.  ERP at bedside

## 2018-01-17 NOTE — ED PROVIDER NOTES
CHIEF COMPLAINT  Chief Complaint   Patient presents with   • GLF     slipped on a sock on wood floor and fell into shelving   • Rib Injury   • Flank Pain     L sided since fall       HPI  Khalida Delaney is a 57 y.o. female who presents with progressive left rib after falling against a shelf 2 days ago.  The patient states that she was getting into bed when she slipped on a sock and fell while standing against a shelf injuring her left ribcage.  She felt immediate severe pain in this area.  She had some initial interval improvement, but the pain has become progressively worse which prompted her presentation.  She has some concurrent shortness of breath due to the pain.  Pain worse with deep breathing.  Constant, stated as severe.  She denies any abdominal pain or back pain.    REVIEW OF SYSTEMS  See HPI for further details. All other systems are negative.     PAST MEDICAL HISTORY  Past Medical History:   Diagnosis Date   • Arthritis    • Back pain    • Bipolar 2 disorder    • Bipolar disorder (CMS-HCC)    • Chronic LBP     chronic upper back pain   • Chronic neck pain    • Chronic neck pain    • Depression    • Hep C w/o coma, chronic (CMS-[facility])    • HTN (hypertension) 6/2/2016   • Melanosis coli 4/20/2017   • Shoulder pain, right     chronic   • Simple cyst of kidney right 6/1/2012   • Tobacco dependence 11/2/2016       FAMILY HISTORY  Family History   Problem Relation Age of Onset   • Cancer Sister 44     breast and cervical/ovarian   • Hypertension Mother    • Diabetes Mother    • Cancer Mother      bone/brain cancer   • Genitourinary () Sister      fibroid tumors   • Stroke Maternal Grandmother    • Stroke Maternal Grandfather    • Heart Disease Maternal Grandfather    • Lung Disease Neg Hx    • Hyperlipidemia Neg Hx        SOCIAL HISTORY  Social History     Social History   • Marital status:      Spouse name: N/A   • Number of children: N/A   • Years of education: N/A     Social History Main Topics  "  • Smoking status: Current Every Day Smoker     Packs/day: 0.25     Years: 35.00     Types: Cigarettes   • Smokeless tobacco: Never Used      Comment: smokes  3 cigs/day   • Alcohol use Yes      Comment: used to drink heavily, quit 1998   • Drug use: No      Comment: 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 Quit in 2004   • Sexual activity: Yes     Partners: Male     Other Topics Concern   • Not on file     Social History Narrative   • No narrative on file      -Current smoker  -Lives in [place] with boyfriend    SURGICAL HISTORY  Past Surgical History:   Procedure Laterality Date   • APPENDECTOMY     • PRIMARY C SECTION     -Tubal ligation    CURRENT MEDICATIONS  Home Medications     Reviewed by Gene Sam, Student (Student Nurse) on 01/17/18 at 0729  Med List Status: <None>   Medication Last Dose Status   alprazolam (XANAX) 1 MG Tab  Active   B Complex Vitamins (VITAMIN B COMPLEX PO)  Active   [place] 450 MG Cap  Active   Cholecalciferol (VITAMIN D) 2000 UNITS Cap  Active   clonazepam (KLONOPIN) 1 MG Tab  Active   meloxicam (MOBIC) 7.5 MG Tab  Active   methadone (DOLOPHINE) 10 MG Tab  Active   Multiple Vitamins-Minerals (MULTIVITAMIN PO)  Active   OS-ARIC CALCIUM + D3 500-200 MG-UNIT Tab  Active   tizanidine (ZANAFLEX) 4 MG capsule  Active   ziprasidone (GEODON) 80 MG Cap  Active                ALLERGIES  Allergies   Allergen Reactions   • Nkda [No Known Drug Allergy]        PHYSICAL EXAM  VITAL SIGNS: /67   Pulse 76   Temp 36.2 °C (97.1 °F)   Resp 18   Ht 1.6 m (5' 3\")   Wt 74.7 kg (164 lb 10.9 oz)   SpO2 96%   BMI 29.17 kg/m²   Constitutional: Awake, alert, in no acute distress, Non-toxic appearance.   HEENT: NCAT.  PERRL 2mm, EOMI,  noninjected. Bilateral external ears normal, mucous membranes dry, throat nonerythematous without exudates, nose is normal.   Neck: Nontender, Normal range of motion, No nuchal rigidity, No stridor.   Lymphatic: No lymphadenopathy noted. "   Cardiovascular: RRR, NMGR  Thorax & Lungs:  CTAB, no tachypnea or retractions, small contusion of left lateral thorax at approximately T10 level, tenderness diffusely in this area, no instability or crepitus  Abdomen: Soft, mild LUQ/flank tenderness, nondistended, no rebound, guarding, masses.  Back: No CVA or spinal tenderness.  Extremities: Intact distal pulses, No edema, No tenderness.   Skin: Warm, Dry, No rashes.   Neurologic: Alert & oriented x 3, sensation intact to light touch all extremities, JIANG, CN III-XI grossly intact by passive exam. No focal deficits.   Psychiatric: Affect normal, Judgment normal, Mood normal.       RADIOLOGY/PROCEDURES  [id number] (WITH 1-VIEW CXR) LEFT   Final Result      1.  Probable minimally displaced LEFT lateral 10th rib fracture.   2.  No pleural fluid collection or pneumothorax.            COURSE & MEDICAL DECISION MAKING  Pertinent Labs & Imaging studies reviewed. (See chart for details)    0735 Patient evaluated, xrays ordered.    Rib fractures identified. No pneumothorax or complication. Patient was given one Norco orally in the ER. I have given a prescription for Norco. The database was reviewed and it is noted that she takes that on a regular basis, but given this acute fracture it is reasonable to provide additional opiate analgesics. In addition of this I have advised alternative for her to take ibuprofen on a scheduled basis. Given an incentive spirometer with instructions. Precautions were given for her to return to ER for difficulty breathing, worsening symptoms, not improving or concern.      FINAL IMPRESSION  1. Rib fracture       In prescribing controlled substances to this patient, I certify that I have obtained and reviewed the medical history of Khalida Jenny Delaney. I have also made a good sherin effort to obtain applicable records from other providers who have treated the patient and records demonstrating the following: chronic methadone use.     I  have conducted a physical exam and documented it. I have reviewed Ms. Delaney’s prescription history as maintained by the [place] Prescription Monitoring Program.     I have assessed the patient’s risk for abuse, dependency, and addiction using the validated Opioid Risk Tool available at https://www.mdcalc.com/rjenua-zhko-sxlb-ort-narcotic-abuse.     Given the above, I believe the benefits of controlled substance therapy outweigh the risks. The reasons for prescribing controlled substances is an acute fracture. Accordingly, I have discussed the risk and benefits, treatment plan, and alternative therapies with the patient.       Electronically signed by: Daquan Galicia MD.  patient was seen in conjunction with family practice resident Raimundo Isabel, 1/17/2018 7:46 AM

## 2018-01-18 ENCOUNTER — OFFICE VISIT (OUTPATIENT)
Dept: MEDICAL GROUP | Facility: MEDICAL CENTER | Age: 58
End: 2018-01-18
Attending: NURSE PRACTITIONER
Payer: MEDICAID

## 2018-01-18 VITALS
RESPIRATION RATE: 16 BRPM | OXYGEN SATURATION: 96 % | SYSTOLIC BLOOD PRESSURE: 142 MMHG | HEIGHT: 63 IN | DIASTOLIC BLOOD PRESSURE: 80 MMHG | WEIGHT: 162 LBS | HEART RATE: 72 BPM | TEMPERATURE: 96.8 F | BODY MASS INDEX: 28.7 KG/M2

## 2018-01-18 DIAGNOSIS — R05.9 COUGH: ICD-10-CM

## 2018-01-18 DIAGNOSIS — R07.81 RIB PAIN ON LEFT SIDE: ICD-10-CM

## 2018-01-18 PROCEDURE — 99213 OFFICE O/P EST LOW 20 MIN: CPT | Performed by: NURSE PRACTITIONER

## 2018-01-18 RX ORDER — HYDROCODONE BITARTRATE AND ACETAMINOPHEN 5; 325 MG/1; MG/1
1 TABLET ORAL EVERY 4 HOURS PRN
Qty: 21 TAB | Refills: 0 | Status: SHIPPED | OUTPATIENT
Start: 2018-01-18 | End: 2018-01-25

## 2018-01-18 RX ORDER — LAMOTRIGINE 100 MG/1
250 TABLET ORAL
COMMUNITY
End: 2019-04-02

## 2018-01-18 RX ORDER — BENZONATATE 100 MG/1
100 CAPSULE ORAL 3 TIMES DAILY PRN
Qty: 30 CAP | Refills: 0 | Status: SHIPPED | OUTPATIENT
Start: 2018-01-18 | End: 2018-12-17

## 2018-01-18 ASSESSMENT — PAIN SCALES - GENERAL: PAINLEVEL: 7=MODERATE-SEVERE PAIN

## 2018-01-18 NOTE — ASSESSMENT & PLAN NOTE
"As above glf and fx to left rib. \"slipped on a sock\" and fell into shelf when coming back from restroom. Pt reports moderate left lateral rib pain.  States only received Norco 5/325 # 15 by ER MD on 1/17/18  Pt asking for more Norco. We discussed her Methadone RX she obtains from  Dr Melo. She reports she pays cash to see him for this.  I instructed her I would not be taking over any chronic pain med management  And would provide a small qty of Norco today, and recommended she make appt  w Dr Melo.    I recommended pillow support on her left ribs when has cough or sneeze.  Denies COPD or Asthma.   States Norco is helping. Helping to sleep.   States cough intermittently.  "

## 2018-01-18 NOTE — PROGRESS NOTES
"Khalida presents to the clinic for ER f/u and Transfer of Care, as Stiven MAHARAJ is no longer at our clinic.    Her PMH includes;    Bipolar and Depression  Arthritis  Chronic Pain ( low back and neck, right shoulder pain)  Hepatitis C( genotype 1A or 1B0+)  Family hx of Breast Ca (sister)  Hypertension  INsomnia  Opioid Dependence, continuous  Osteopenia  Over-weight  Right kidney simple cyst  Tobacco use- smoking  Melanosis coli  Left rib fx 2' glf against shelf  Drug Abuse hx ( Meth, cocaine, heroin)    Established w  Pain Management- Dr Melo ( Methadone)  Psychiatry- Fariha Andrews    [place]:  1/9/18 Xanax 1 mg # 90 by Fariha Carreon (Psychiatry)  1/4/18 Methadone 10 mg # 330 by Ino Coyle  12/28/17 Xanax 1 mg # 90 by Lauri  30 RX and 4 Prescribers in report.      Review of Records:    1/17/18 ER visit for ground level fall against shelf injuring left rib cage---> Xray- probable minimally displaced Left lat Rib FX, otherwise normal.   RX for Norco 5 /325 # 15 by ER MD.    9/7/17 Clinic appt w Stiven for over-weight, HTN, Chronic LBP, Bipolar disorder f/u. Reports Dr Melo working on reducing her Methadone dose.      Chief Complaint:    HPI:      Rib pain on left side  As above glf and fx to left rib. \"slipped on a sock\" and fell into shelf when coming back from restroom. Pt reports moderate left lateral rib pain.  States only received Norco 5/325 # 15 by ER MD on 1/17/18  Pt asking for more Norco. We discussed her Methadone RX she obtains from  Dr Melo. She reports she pays cash to see him for this.  I instructed her I would not be taking over any chronic pain med management  And would provide a small qty of Norco today, and recommended she make appt  w Dr Melo. Denies sob, fever or feeling ill.    I recommended pillow support on her left ribs when has cough or sneeze.  Denies COPD or Asthma. Will give her RX for cough drops.  States Norco is helping. Helping to sleep. "   States cough intermittently.      Patient Active Problem List    Diagnosis Date Noted   • Rib pain on left side 01/18/2018   • Overweight (BMI 25.0-29.9) 09/07/2017   • Melanosis coli 04/20/2017   • Family history of breast cancer in sister 11/02/2016   • Tobacco dependence 11/02/2016   • HTN (hypertension) 06/02/2016   • Opioid type dependence, continuous (CMS-HCC) 05/19/2016   • Osteopenia 01/22/2013   • Simple cyst of kidney right 06/01/2012   • Insomnia 01/06/2012   • Arthritis    • Bipolar disorder (CMS-HCC)    • Chronic neck pain    • Shoulder pain, right    • Chronic low back pain    • Hep C w/o coma, chronic GENOTYPE 1A OR 1B        Allergies:Nkda [no known drug allergy]    Current Outpatient Prescriptions   Medication Sig Dispense Refill   • lamotrigine (LAMICTAL) 100 MG Tab Take 250 mg by mouth every bedtime.     • benzonatate (TESSALON) 100 MG Cap Take 1 Cap by mouth 3 times a day as needed. 30 Cap 0   • hydrocodone-acetaminophen (NORCO) 5-325 MG Tab per tablet Take 1 Tab by mouth every four hours as needed for up to 7 days. 21 Tab 0   • alprazolam (XANAX) 1 MG Tab Take 1 mg by mouth 3 times a day as needed for Sleep.     • hydrocodone-acetaminophen (NORCO) 5-325 MG Tab per tablet Take 1-2 Tabs by mouth every 6 hours as needed for up to 2 days. 15 Tab 0   • OS-ARIC CALCIUM + D3 500-200 MG-UNIT Tab TAKE 1 TAB BY MOUTH 2 TIMES A DAY, WITH MEALS. 60 Tab 6   • meloxicam (MOBIC) 7.5 MG Tab TAKE 1 TABLET BY MOUTH DAILY 30 Tab 6   • ziprasidone (GEODON) 80 MG Cap Take 160 mg by mouth every bedtime.  1   • Multiple Vitamins-Minerals (MULTIVITAMIN PO) Take 1 Tab by mouth every day at 6 PM.     • B Complex Vitamins (VITAMIN B COMPLEX PO) Take 1 Tab by mouth every day at 6 PM.     • Cholecalciferol (VITAMIN D) 2000 UNITS Cap Take 1 Cap by mouth every day at 6 PM.     • methadone (DOLOPHINE) 10 MG Tab Take 5 Tabs by mouth 2 Times a Day. (Patient taking differently: Take 110 mg by mouth every day.) 30 Tab 0     No  "current facility-administered medications for this visit.        Social History   Substance Use Topics   • Smoking status: Current Every Day Smoker     Packs/day: 0.25     Years: 35.00     Types: Cigarettes   • Smokeless tobacco: Never Used      Comment: smokes  3 cigs/day   • Alcohol use Yes      Comment: used to drink heavily, quit 1998       Family History   Problem Relation Age of Onset   • Cancer Sister 44     breast and cervical/ovarian   • Hypertension Mother    • Diabetes Mother    • Cancer Mother      bone/brain cancer   • Genitourinary () Sister      fibroid tumors   • Stroke Maternal Grandmother    • Stroke Maternal Grandfather    • Heart Disease Maternal Grandfather    • Lung Disease Neg Hx    • Hyperlipidemia Neg Hx        ROS:  Review of Systems   See HPI Above    Exam:  Blood pressure 142/80, pulse 72, temperature 36 °C (96.8 °F), resp. rate 16, height 1.6 m (5' 2.99\"), weight 73.5 kg (162 lb), SpO2 96 %.  General:  Well nourished, well developed female in moderate distress.  HENT:Head is grossly normal. PERRL.  Neck: Supple. Trachea is midline.  Pulmonary: Clear to ausculation .  Normal effort. No rales, ronchi, or wheezing.  Chest: mild tenderness to left lateral ribs, no bruising or crepitus.    Cardiovascular: Regular rate and rhythm.  Abdomen-Abdomen is soft, No tenderness.  Upper extremities- Strong = . Good ROM  Lower extremities- neg for edema, redness, tenderness.  Neuro- A & O x 4. Speech clear and appropriate.     Current medications, allergies, and problem list reviewed with patient and updated in  [facility] today.    Assessment/Plan:  1. Rib pain on left side  hydrocodone-acetaminophen (NORCO) 5-325 MG Tab per tablet # 21 for acute injury  (Pt instructed no refills anticipated and to make appt to see Dr Melo)  Peak Flow Spirometer given to patient to take deep breaths while holding pillow against left ribs to prevent under ventilation.    Instructed if worsening pain or shortness of " breath to go to ER for re-eval.   2. Cough  benzonatate (TESSALON) 100 MG Cap   Follow up as needed. Call or return if questions, concerns, or worsening condition.

## 2018-03-07 RX ORDER — TIZANIDINE HYDROCHLORIDE 4 MG/1
CAPSULE, GELATIN COATED ORAL
Qty: 60 CAP | Refills: 3 | Status: SHIPPED | OUTPATIENT
Start: 2018-03-07 | End: 2018-12-17

## 2018-05-08 ENCOUNTER — OFFICE VISIT (OUTPATIENT)
Dept: MEDICAL GROUP | Facility: MEDICAL CENTER | Age: 58
End: 2018-05-08
Attending: NURSE PRACTITIONER
Payer: MEDICAID

## 2018-05-08 VITALS
HEIGHT: 63 IN | DIASTOLIC BLOOD PRESSURE: 80 MMHG | SYSTOLIC BLOOD PRESSURE: 112 MMHG | WEIGHT: 160 LBS | HEART RATE: 66 BPM | OXYGEN SATURATION: 93 % | RESPIRATION RATE: 18 BRPM | BODY MASS INDEX: 28.35 KG/M2 | TEMPERATURE: 97.8 F

## 2018-05-08 DIAGNOSIS — Z74.8 ASSISTANCE WITH TRANSPORTATION: ICD-10-CM

## 2018-05-08 PROBLEM — S52.509A FRACTURE OF DISTAL END OF RADIUS: Status: ACTIVE | Noted: 2018-05-08

## 2018-05-08 PROCEDURE — 99214 OFFICE O/P EST MOD 30 MIN: CPT | Performed by: NURSE PRACTITIONER

## 2018-05-08 PROCEDURE — 7101 PR PHYSICAL: Performed by: NURSE PRACTITIONER

## 2018-05-08 NOTE — PROGRESS NOTES
Chief Complaint:   Chief Complaint   Patient presents with   • Orders Needed     paperwork needed for DMV        HPI:  Khalida is here today for Assist w Transportation/DMV Paperwork    Her PMH includes;     Bipolar and Depression  Arthritis  Chronic Pain ( low back and neck, right shoulder pain)  Hepatitis C( genotype 1A or 1B)  Family hx of Breast Ca (sister)  Hypertension  INsomnia  Opioid Dependence, continuous  Osteopenia  Over-weight  Right kidney simple cyst  Tobacco use- smoking  Melanosis coli  Left rib fx 2' glf against shelf  Drug Abuse hx ( Meth, cocaine, heroin)     Established w  Pain Management- Dr Melo ( Methadone)  Psychiatry- Fariha Andrews     [place]  Report:  5/2/18 Xanax 1 mg # 90 by Fariha Carreon (Psychiatry)  4/27/18  Methadone 10 mg  330 by Ino Coyle  4/24/18 Clonazepam 1 mg # 90 by Fariha Woo     43 RX and 6 Prescribers in 12 month report.        Review of Records:    1/18/18 Clinic for Transfer of Care to me, as Stiven Garcia CAROL ANN no longer at our Clinic.  Concerns over Left Rib pain/Fx ER f/u, Asking for Norco. Discussed that she is getting Methadone from DR Melo and recommend no   Ongoing Narcotics and that I would not be managing chronic issues w Narcotics in the future.  RX for Norco for this acute injury/rib fx completed for Khalida. Cough--> Tessalon Capsules RX and Peak Flow Inspirometer device to help  Pt w lung expansion.     1/17/18 ER visit for ground level fall against shelf injuring left rib cage---> Xray- probable minimally displaced Left lat Rib FX, otherwise normal.   RX for Norco 5 /325 # 15 by ER MD.     9/7/17 Clinic appt w Stiven for over-weight, HTN, Chronic LBP, Bipolar disorder f/u. Reports Dr Melo working on reducing her Methadone dose.       Assistance with transportation  Pt reports needs re-do of DMV paperwork for a 2 year disabled plactraci (x 2)  States due to her low back pain and difficulty walking too far it really helps to be  able to park close in disable parking and less chance of falling.  Hx of falls in past w fx ribs and fx left wrist.      Patient Active Problem List    Diagnosis Date Noted   • Fracture of distal end of radius 05/08/2018   • Assistance with transportation 05/08/2018   • Rib pain on left side 01/18/2018   • Overweight (BMI 25.0-29.9) 09/07/2017   • Melanosis coli 04/20/2017   • Family history of breast cancer in sister 11/02/2016   • Tobacco dependence 11/02/2016   • HTN (hypertension) 06/02/2016   • Opioid type dependence, continuous (HCC) 05/19/2016   • Osteopenia 01/22/2013   • Simple cyst of kidney right 06/01/2012   • Insomnia 01/06/2012   • Arthritis    • Bipolar disorder (HCC)    • Chronic neck pain    • Shoulder pain, right    • Chronic low back pain    • Hep C w/o coma, chronic GENOTYPE 1A OR 1B        Allergies:Nkda [no known drug allergy]    Medicines as of today:  Current Outpatient Prescriptions   Medication Sig Dispense Refill   • lamotrigine (LAMICTAL) 100 MG Tab Take 250 mg by mouth every bedtime.     • alprazolam (XANAX) 1 MG Tab Take 1 mg by mouth 3 times a day as needed for Sleep.     • OS-ARIC CALCIUM + D3 500-200 MG-UNIT Tab TAKE 1 TAB BY MOUTH 2 TIMES A DAY, WITH MEALS. 60 Tab 6   • ziprasidone (GEODON) 80 MG Cap Take 160 mg by mouth every bedtime.  1   • Multiple Vitamins-Minerals (MULTIVITAMIN PO) Take 1 Tab by mouth every day at 6 PM.     • B Complex Vitamins (VITAMIN B COMPLEX PO) Take 1 Tab by mouth every day at 6 PM.     • Cholecalciferol (VITAMIN D) 2000 UNITS Cap Take 1 Cap by mouth every day at 6 PM.     • methadone (DOLOPHINE) 10 MG Tab Take 5 Tabs by mouth 2 Times a Day. (Patient taking differently: Take 110 mg by mouth every day.) 30 Tab 0   • tizanidine (ZANAFLEX) 4 MG capsule TAKE 1 CAPSULE BY MOUTH TWICE A DAY AS NEEDED 60 Cap 3   • benzonatate (TESSALON) 100 MG Cap Take 1 Cap by mouth 3 times a day as needed. 30 Cap 0   • meloxicam (MOBIC) 7.5 MG Tab TAKE 1 TABLET BY MOUTH DAILY  "30 Tab 6     No current facility-administered medications for this visit.        Social History   Substance Use Topics   • Smoking status: Current Every Day Smoker     Packs/day: 0.25     Years: 35.00     Types: Cigarettes   • Smokeless tobacco: Never Used      Comment: smokes  3 cigs/day   • Alcohol use Yes      Comment: used to drink heavily, quit 1998       Past Medical History:   Diagnosis Date   • Arthritis    • Back pain    • Bipolar 2 disorder    • Bipolar disorder (HCC)    • Chronic LBP     chronic upper back pain   • Chronic neck pain    • Chronic neck pain    • Depression    • Hep C w/o coma, chronic (HCC)    • HTN (hypertension) 6/2/2016   • Melanosis coli 4/20/2017   • Shoulder pain, right     chronic   • Simple cyst of kidney right 6/1/2012   • Tobacco dependence 11/2/2016       Family History   Problem Relation Age of Onset   • Cancer Sister 44     breast and cervical/ovarian   • Hypertension Mother    • Diabetes Mother    • Cancer Mother      bone/brain cancer   • Genitourinary () Sister      fibroid tumors   • Stroke Maternal Grandmother    • Stroke Maternal Grandfather    • Heart Disease Maternal Grandfather    • Lung Disease Neg Hx    • Hyperlipidemia Neg Hx        ROS:  Review of Systems   See HPI Above    Exam:  Blood pressure 112/80, pulse 66, temperature 36.6 °C (97.8 °F), resp. rate 18, height 1.6 m (5' 3\"), weight 72.6 kg (160 lb), SpO2 93 %, not currently breastfeeding. Body mass index is 28.34 kg/m².    General:  Well nourished, well developed female in NAD  HENT:Head is grossly normal. PERRL.  Neck: Supple. Trachea is midline.  Pulmonary: Speaks in full sentences easily. Normal effort.  Cardiovascular: Regular rate and rhythm.  Abdomen-Abdomen is soft  Upper extremities- Strong = . Good ROM, mild swelling to left hand/wrist, no redness.  Lower extremities- neg for edema, redness, tenderness.  Neuro- A & O x 4. Speech clear and appropriate.    Current medications, allergies, and " problem list reviewed with patient and updated in EPIC today.    Assessment/Plan:  1. Assistance with transportation  DMV form completed and DR Pandey co-sign w me.  See scanned in media. (2 yr temporary disabled for 2 placards)       Return if symptoms worsen or fail to improve.

## 2018-05-08 NOTE — ASSESSMENT & PLAN NOTE
Pt reports needs re-do of DMV paperwork for a 2 year disabled marcin (x 2)  States due to her low back pain and difficulty walking too far it really helps to be able to park close in disable parking and less chance of falling.  Hx of falls in past w fx ribs and fx left wrist.

## 2018-12-17 ENCOUNTER — OFFICE VISIT (OUTPATIENT)
Dept: MEDICAL GROUP | Facility: MEDICAL CENTER | Age: 58
End: 2018-12-17
Attending: NURSE PRACTITIONER
Payer: MEDICARE

## 2018-12-17 VITALS
WEIGHT: 173 LBS | RESPIRATION RATE: 16 BRPM | OXYGEN SATURATION: 100 % | HEIGHT: 63 IN | SYSTOLIC BLOOD PRESSURE: 124 MMHG | HEART RATE: 74 BPM | BODY MASS INDEX: 30.65 KG/M2 | DIASTOLIC BLOOD PRESSURE: 70 MMHG | TEMPERATURE: 98.1 F

## 2018-12-17 DIAGNOSIS — E55.9 VITAMIN D DEFICIENCY: ICD-10-CM

## 2018-12-17 DIAGNOSIS — Z12.11 SCREENING FOR COLON CANCER: ICD-10-CM

## 2018-12-17 DIAGNOSIS — F17.200 TOBACCO DEPENDENCE: ICD-10-CM

## 2018-12-17 DIAGNOSIS — L08.9 SKIN INFECTION: ICD-10-CM

## 2018-12-17 DIAGNOSIS — Z12.39 SCREENING FOR BREAST CANCER: ICD-10-CM

## 2018-12-17 DIAGNOSIS — H61.91 LESION OF RIGHT EXTERNAL EAR: ICD-10-CM

## 2018-12-17 DIAGNOSIS — M85.80 OSTEOPENIA, UNSPECIFIED LOCATION: ICD-10-CM

## 2018-12-17 PROBLEM — H61.92 LESION OF LEFT EXTERNAL EAR: Status: ACTIVE | Noted: 2018-12-17

## 2018-12-17 PROCEDURE — 99212 OFFICE O/P EST SF 10 MIN: CPT | Performed by: NURSE PRACTITIONER

## 2018-12-17 PROCEDURE — 99213 OFFICE O/P EST LOW 20 MIN: CPT | Performed by: NURSE PRACTITIONER

## 2018-12-17 RX ORDER — DOXYCYCLINE 100 MG/1
100 CAPSULE ORAL 2 TIMES DAILY
Qty: 14 CAP | Refills: 0 | Status: SHIPPED | OUTPATIENT
Start: 2018-12-17 | End: 2019-04-02

## 2018-12-17 RX ORDER — BUSPIRONE HYDROCHLORIDE 10 MG/1
20 TABLET ORAL 2 TIMES DAILY
COMMUNITY
End: 2021-08-17

## 2018-12-17 RX ORDER — CLOTRIMAZOLE AND BETAMETHASONE DIPROPIONATE 10; .64 MG/G; MG/G
CREAM TOPICAL
Qty: 1 TUBE | Refills: 1 | Status: SHIPPED | OUTPATIENT
Start: 2018-12-17 | End: 2019-04-02

## 2018-12-17 ASSESSMENT — PAIN SCALES - GENERAL: PAINLEVEL: NO PAIN

## 2018-12-17 NOTE — PROGRESS NOTES
Chief Complaint:   Chief Complaint   Patient presents with   • Follow-Up     sore on left ear for a few months       HPI:  Khalida is here today early for concern about a spot on her  RIGHT ear.    Her PMH includes;     Bipolar and Depression  Arthritis  Chronic Pain ( low back and neck, right shoulder pain)  Hepatitis C( genotype 1A or 1B)  Family hx of Breast Ca (sister)  Hypertension  INsomnia  Opioid Dependence, continuous  Osteopenia  Over-weight  Right kidney simple cyst  Tobacco use- smoking  Melanosis coli  Left rib fx 2' glf against shelf  Drug Abuse hx ( Meth, cocaine, heroin)     Established w  Pain Management- Dr Melo ( Methadone)  Psychiatry- Fariha Andrews     [place]  Report:  12/14/18 Methadone 10 mg tab # 330 by Dr Melo  12/7/18 Xanax 1 mg # 120 by Fariha Carreon( Psychiatry)  Similar entries in report........        Review of Records:   5/8/18 Clinic for DMV Paperwork, Continues Methadone via Dr Melo, RX one time fo Norco r/t rib fx., wrist fx.  1/18/18 Clinic for Transfer of Care to me, as Stiven Garcia APRELLYN no longer at our Clinic.  Concerns over Left Rib pain/Fx ER f/u, Asking for Norco. Discussed that she is getting Methadone from DR Melo and recommend no   Ongoing Narcotics and that I would not be managing chronic issues w Narcotics in the future.  RX for Norco for this acute injury/rib fx completed for Khalida. Cough--> Tessalon Capsules RX and Peak Flow Inspirometer device to help  Pt w lung expansion.     1/17/18 ER visit for ground level fall against shelf injuring left rib cage---> Xray- probable minimally displaced Left lat Rib FX, otherwise normal.   RX for Norco 5 /325 # 15 by ER MD.     9/7/17 Clinic appt w Stiven for over-weight, HTN, Chronic LBP, Bipolar disorder f/u. Reports Dr Melo working on reducing her Methadone dose.    Lesion of right external ear  Reports for over 6 months she has had a skin lesion to left ear. Feels a little irritating when brushes w  hair.  Sometimes it scabs over.  Denies fever or feeling ill.  Feels small irritating lump there at times.  Denies issues inside ear.  Denies hearing change.      Tobacco dependence  Continues to smoke.  Not ready to quit.    Osteopenia  Hx of Osteopenia.  Not taking Vitamin D.  Recommend we restart it.      Patient Active Problem List    Diagnosis Date Noted   • Lesion of right external ear 12/17/2018   • Fracture of distal end of radius 05/08/2018   • Assistance with transportation 05/08/2018   • Rib pain on left side 01/18/2018   • Overweight (BMI 25.0-29.9) 09/07/2017   • Melanosis coli 04/20/2017   • Family history of breast cancer in sister 11/02/2016   • Tobacco dependence 11/02/2016   • HTN (hypertension) 06/02/2016   • Opioid type dependence, continuous (HCC) 05/19/2016   • Osteopenia 01/22/2013   • Simple cyst of kidney right 06/01/2012   • Insomnia 01/06/2012   • Arthritis    • Bipolar disorder (HCC)    • Chronic neck pain    • Shoulder pain, right    • Chronic low back pain    • Hep C w/o coma, chronic GENOTYPE 1A OR 1B        Allergies:Nkda [no known drug allergy]    Medicines as of today:  Current Outpatient Prescriptions   Medication Sig Dispense Refill   • busPIRone (BUSPAR) 10 MG Tab tablet Take 20 mg by mouth every day.     • doxycycline (MONODOX) 100 MG capsule Take 1 Cap by mouth 2 times a day. 14 Cap 0   • clotrimazole-betamethasone (LOTRISONE) 1-0.05 % Cream Apply tsp of cream to right ear lobe skin lesion twice daily. 1 Tube 1   • Cholecalciferol 4000 units Cap Take 1 Capsule by mouth every day. 30 Cap 5   • lamotrigine (LAMICTAL) 100 MG Tab Take 250 mg by mouth every bedtime.     • alprazolam (XANAX) 1 MG Tab Take 1 mg by mouth 3 times a day as needed for Sleep.     • ziprasidone (GEODON) 80 MG Cap Take 160 mg by mouth every bedtime.  1   • Multiple Vitamins-Minerals (MULTIVITAMIN PO) Take 1 Tab by mouth every day at 6 PM.     • methadone (DOLOPHINE) 10 MG Tab Take 5 Tabs by mouth 2 Times a  "Day. (Patient taking differently: Take 110 mg by mouth every day.) 30 Tab 0     No current facility-administered medications for this visit.        Social History   Substance Use Topics   • Smoking status: Current Every Day Smoker     Packs/day: 0.25     Years: 35.00     Types: Cigarettes   • Smokeless tobacco: Never Used      Comment: smokes  3 cigs/day   • Alcohol use Yes      Comment: used to drink heavily, quit 1998       Past Medical History:   Diagnosis Date   • Arthritis    • Back pain    • Bipolar 2 disorder    • Bipolar disorder (HCC)    • Chronic LBP     chronic upper back pain   • Chronic neck pain    • Chronic neck pain    • Depression    • Hep C w/o coma, chronic (HCC)    • HTN (hypertension) 6/2/2016   • Melanosis coli 4/20/2017   • Shoulder pain, right     chronic   • Simple cyst of kidney right 6/1/2012   • Tobacco dependence 11/2/2016       Family History   Problem Relation Age of Onset   • Cancer Sister 44        breast and cervical/ovarian   • Hypertension Mother    • Diabetes Mother    • Cancer Mother         bone/brain cancer   • Genitourinary () Sister         fibroid tumors   • Stroke Maternal Grandmother    • Stroke Maternal Grandfather    • Heart Disease Maternal Grandfather    • Lung Disease Neg Hx    • Hyperlipidemia Neg Hx        ROS:  Review of Systems   See HPI Above    Exam:  Blood pressure 124/70, pulse 74, temperature 36.7 °C (98.1 °F), temperature source Temporal, resp. rate 16, height 1.6 m (5' 2.99\"), weight 78.5 kg (173 lb), SpO2 100 %, not currently breastfeeding. Body mass index is 30.65 kg/m².    General:  Well nourished, over-weight, well developed female in NAD  HENT:Head is grossly normal. PERRL. Right ear pinna w very small dry pin head sized firmness.  No redness or tenderness or drainage.   Neck: Supple. Trachea is midline.  Pulmonary: Clear to ausculation .  Normal effort. No rales, ronchi, or wheezing.   Cardiovascular: Regular rate and rhythm.  Abdomen-Abdomen is " soft, No tenderness.  Upper extremities- Strong = . Good ROM  Lower extremities- neg for edema, redness, tenderness.  Neuro- A & O x 4. Speech clear and appropriate.    Current medications, allergies, and problem list reviewed with patient and updated in [facility] today.    Assessment/Plan:  1. Lesion of right external ear  doxycycline (MONODOX) 100 MG capsule    clotrimazole-betamethasone (LOTRISONE) 1-0.05 % Cream   2. Skin infection  doxycycline (MONODOX) 100 MG capsule    clotrimazole-betamethasone (LOTRISONE) 1-0.05 % Cream   3. Screening for breast cancer  MA-SCREEN MAMMO W/CAD-BILAT   4. Screening for colon cancer  OCCULT BLOOD FECES IMMUNOASSAY   5. Osteopenia, unspecified location  Cholecalciferol 4000 units Cap   6. Vitamin D deficiency  Cholecalciferol 4000 units Cap   7. Tobacco dependence  Not ready to quit.       Return in about 6 months (around 6/17/2019).

## 2018-12-17 NOTE — ASSESSMENT & PLAN NOTE
Reports for over 6 months she has had a skin lesion to left ear. Feels a little irritating when brushes w hair.  Sometimes it scabs over.  Denies fever or feeling ill.  Feels small irritating lump there at times.  Denies issues inside ear.  Denies hearing change.

## 2018-12-21 ENCOUNTER — HOSPITAL ENCOUNTER (OUTPATIENT)
Facility: MEDICAL CENTER | Age: 58
End: 2018-12-21
Attending: NURSE PRACTITIONER
Payer: MEDICARE

## 2018-12-21 PROCEDURE — 82274 ASSAY TEST FOR BLOOD FECAL: CPT

## 2018-12-26 DIAGNOSIS — Z12.11 SCREENING FOR COLON CANCER: ICD-10-CM

## 2018-12-27 LAB — HEMOCCULT STL QL IA: POSITIVE

## 2018-12-29 ENCOUNTER — HOSPITAL ENCOUNTER (OUTPATIENT)
Dept: RADIOLOGY | Facility: MEDICAL CENTER | Age: 58
End: 2018-12-29
Attending: NURSE PRACTITIONER
Payer: MEDICARE

## 2018-12-29 DIAGNOSIS — Z12.39 SCREENING FOR BREAST CANCER: ICD-10-CM

## 2018-12-29 PROCEDURE — 77067 SCR MAMMO BI INCL CAD: CPT

## 2018-12-30 DIAGNOSIS — K63.89 MELANOSIS COLI: ICD-10-CM

## 2018-12-30 DIAGNOSIS — R19.5 POSITIVE FIT (FECAL IMMUNOCHEMICAL TEST): ICD-10-CM

## 2018-12-31 ENCOUNTER — TELEPHONE (OUTPATIENT)
Dept: MEDICAL GROUP | Facility: MEDICAL CENTER | Age: 58
End: 2018-12-31

## 2018-12-31 NOTE — TELEPHONE ENCOUNTER
Phone Number Called: [phone] (home)     Message: Called pt. Left message to call back regarding FIT test results.    Left Message for patient to call back: yes

## 2019-02-25 ENCOUNTER — TELEPHONE (OUTPATIENT)
Dept: MEDICAL GROUP | Facility: MEDICAL CENTER | Age: 59
End: 2019-02-25

## 2019-02-26 ENCOUNTER — HOSPITAL ENCOUNTER (OUTPATIENT)
Dept: RADIOLOGY | Facility: MEDICAL CENTER | Age: 59
End: 2019-02-26
Attending: PHYSICIAN ASSISTANT
Payer: MEDICARE

## 2019-02-26 DIAGNOSIS — R74.8 ELEVATED LIVER ENZYMES: ICD-10-CM

## 2019-02-26 DIAGNOSIS — B18.2 CHRONIC HEPATITIS C WITH HEPATIC COMA (HCC): ICD-10-CM

## 2019-02-26 DIAGNOSIS — K92.1 BLOOD IN STOOL: ICD-10-CM

## 2019-02-26 PROCEDURE — 76700 US EXAM ABDOM COMPLETE: CPT

## 2019-02-26 NOTE — TELEPHONE ENCOUNTER
1. Name: Khalida      Call Back Number: [phone]  Patient approves a detailed voicemail message:   Patient came into office and requested a refill on the vitamin D, pt stated that she recently picked up a recent refill but lost the pill bottle. Pt is hoping a PA can be sent through for her to be able to  another refill.

## 2019-03-12 DIAGNOSIS — E55.9 VITAMIN D DEFICIENCY: ICD-10-CM

## 2019-03-12 DIAGNOSIS — M85.80 OSTEOPENIA, UNSPECIFIED LOCATION: ICD-10-CM

## 2019-04-02 ENCOUNTER — OFFICE VISIT (OUTPATIENT)
Dept: MEDICAL GROUP | Facility: MEDICAL CENTER | Age: 59
End: 2019-04-02
Attending: FAMILY MEDICINE
Payer: MEDICARE

## 2019-04-02 VITALS
SYSTOLIC BLOOD PRESSURE: 112 MMHG | TEMPERATURE: 97.5 F | BODY MASS INDEX: 31.18 KG/M2 | RESPIRATION RATE: 14 BRPM | HEART RATE: 76 BPM | OXYGEN SATURATION: 92 % | WEIGHT: 176 LBS | HEIGHT: 63 IN | DIASTOLIC BLOOD PRESSURE: 65 MMHG

## 2019-04-02 DIAGNOSIS — M54.40 CHRONIC LOW BACK PAIN WITH SCIATICA, SCIATICA LATERALITY UNSPECIFIED, UNSPECIFIED BACK PAIN LATERALITY: ICD-10-CM

## 2019-04-02 DIAGNOSIS — Z86.59 HISTORY OF PSYCHIATRIC DISORDER: ICD-10-CM

## 2019-04-02 DIAGNOSIS — B18.2 HEP C W/O COMA, CHRONIC (HCC): ICD-10-CM

## 2019-04-02 DIAGNOSIS — G89.29 CHRONIC LOW BACK PAIN WITH SCIATICA, SCIATICA LATERALITY UNSPECIFIED, UNSPECIFIED BACK PAIN LATERALITY: ICD-10-CM

## 2019-04-02 DIAGNOSIS — F31.30 BIPOLAR AFFECTIVE DISORDER, CURRENT EPISODE DEPRESSED, CURRENT EPISODE SEVERITY UNSPECIFIED (HCC): ICD-10-CM

## 2019-04-02 DIAGNOSIS — E03.9 HYPOTHYROIDISM, UNSPECIFIED TYPE: ICD-10-CM

## 2019-04-02 DIAGNOSIS — Z00.00 HEALTHCARE MAINTENANCE: ICD-10-CM

## 2019-04-02 PROCEDURE — 99213 OFFICE O/P EST LOW 20 MIN: CPT | Performed by: FAMILY MEDICINE

## 2019-04-02 PROCEDURE — 99214 OFFICE O/P EST MOD 30 MIN: CPT | Performed by: FAMILY MEDICINE

## 2019-04-02 RX ORDER — LAMOTRIGINE 150 MG/1
300 TABLET ORAL
COMMUNITY
Start: 2019-03-22

## 2019-04-02 ASSESSMENT — ENCOUNTER SYMPTOMS
BACK PAIN: 1
SENSORY CHANGE: 0
ABDOMINAL PAIN: 0
NERVOUS/ANXIOUS: 1
FOCAL WEAKNESS: 0
SPEECH CHANGE: 0
COUGH: 0
VOMITING: 0
HEADACHES: 0
SHORTNESS OF BREATH: 0
INSOMNIA: 0
DEPRESSION: 1
TREMORS: 0
NAUSEA: 0
SPUTUM PRODUCTION: 0
FEVER: 0
PALPITATIONS: 0
EYES NEGATIVE: 1
HALLUCINATIONS: 0
CHILLS: 0
TINGLING: 1

## 2019-04-02 ASSESSMENT — LIFESTYLE VARIABLES: SUBSTANCE_ABUSE: 0

## 2019-04-02 NOTE — PROGRESS NOTES
Subjective:      Khalida Delaney is a 58 y.o. female who presents with Results (us)            Patient 58-year-old female here to reestablish with the clinic today.    She has a past history of hepatitis C, positive fit test, psychiatric disorder, bipolar affective disorder, chronic pain syndrome and hypothyroidism.    She had been referred back to GI at the end of last year for a positive fit screen.  Patient states that when the test had been repeated by GI the results were negative.  She states that they have not yet performed a colonoscopy on her.  She is currently following up with them for the management of her hepatitis C.  She states that they have discussed treatment of the hep C but has not yet started it.  She is not having any visible bleeding from her rectum, no abdominal pain, no other obvious signs of liver disease.  Her most recent abdominal ultrasound showed the following results:    Hepatomegaly. Hepatic steatosis. Fibrosis not excluded. No free fluid.  1.2 cm right adrenal cyst unchanged.  Loss of renal parenchyma. No hydronephrosis.    We will have her continue to follow-up with GI for assistance in management of her liver disease.  No ascites have been seen on the ultrasound but changes to the liver parenchyma were noted.  She was also noted to have loss of her renal parenchyma.  Her last blood work showed a normal kidney function in 2017, but will recheck her complete metabolic panel to reassess her kidney and liver function.  We will continue to follow.    She is currently following up with mental health for her bipolar disorder as well as her other psychiatric disorders.  She states she is doing well with her current medications.  She is not having any urges to harm herself or others.  We will have her continue to take her medications as directed by them.  But if she has any worsening of her symptoms or the urge to harm herself or others she has been instructed to go to the emergency room  "for further assistance in management.    Her past surgical history includes an appendectomy and .    Her family medical history includes cancer in her mother and her sister, diabetes and hypertension in her mother and strokes in her grandparents.    She smokes less than a pack a day of cigarettes, is a former heavy alcohol trigger, and denies any current substance use.     Current medications, allergies, and problem list reviewed with patient and updated in [facility].          Review of Systems   Constitutional: Negative for chills and fever.   HENT: Negative for hearing loss and tinnitus.    Eyes: Negative.    Respiratory: Negative for cough, sputum production and shortness of breath.    Cardiovascular: Negative for chest pain and palpitations.   Gastrointestinal: Negative for abdominal pain, nausea and vomiting.   Musculoskeletal: Positive for back pain and joint pain.   Skin: Negative for rash.   Neurological: Positive for tingling. Negative for tremors, sensory change, speech change, focal weakness and headaches.   Psychiatric/Behavioral: Positive for depression. Negative for hallucinations, substance abuse and suicidal ideas. The patient is nervous/anxious. The patient does not have insomnia.           Objective:     /65 (BP Location: Left arm, Patient Position: Sitting)   Pulse 76   Temp 36.4 °C (97.5 °F)   Resp 14   Ht 1.6 m (5' 3\")   Wt 79.8 kg (176 lb)   SpO2 92%   BMI 31.18 kg/m²      Physical Exam   Constitutional: She is oriented to person, place, and time. She appears well-developed and well-nourished.   HENT:   Head: Normocephalic and atraumatic.   Eyes: Pupils are equal, round, and reactive to light. Conjunctivae and EOM are normal.   Neck: Normal range of motion. Neck supple. No thyromegaly present.   Cardiovascular: Normal rate, regular rhythm and normal heart sounds.  Exam reveals no friction rub.    No murmur heard.  Pulmonary/Chest: Effort normal and breath sounds normal. No " respiratory distress. She has no wheezes. She has no rales.   Abdominal: Soft. Bowel sounds are normal. She exhibits no distension. There is no tenderness.   Musculoskeletal:   Slight decreased range of motion of affected extremities and back in flexion   Lymphadenopathy:     She has no cervical adenopathy.   Neurological: She is alert and oriented to person, place, and time.   Skin: Skin is warm and dry.   Psychiatric: She has a normal mood and affect. Her behavior is normal.   Nursing note and vitals reviewed.              Assessment/Plan:     1. Hypothyroidism, unspecified type  We will have her repeat her TSH and T4.  In 2017 her levels were slightly elevated.  She is not currently on any medication for her thyroid.  We will continue to follow.  - TSH WITH REFLEX TO FT4; Future  - VITAMIN D,25 HYDROXY; Future    2. Bipolar affective disorder, current episode depressed, current episode severity unspecified (HCC)  We will have her continue to take her medications as directed by her mental health providers.  She is not having any urges to harm self or others.  ER precautions have been given to her.    3. History of psychiatric disorder  See above plan.    4. Chronic low back pain with sciatica, sciatica laterality unspecified, unspecified back pain laterality  We will have her continue to use her over-the-counter medications as needed for her chronic pain.  We will continue to follow.    5. Hep C w/o coma, chronic GENOTYPE 1A OR 1B  She will continue to follow-up with gastroenterology for further assistance in management of her hepatitis C.  Reviewed the results of her recent ultrasound.  Will reorder her complete metabolic panel to reassess her kidney and liver function.  We will continue to follow.  - Comp Metabolic Panel; Future  - CBC WITH DIFFERENTIAL; Future    6. Healthcare maintenance  A lipid panel has been ordered for patient today.  Along with a complete blood count and complete metabolic panel and  vitamin D levels.  We will continue to follow.  - Lipid Profile; Future

## 2019-05-02 ENCOUNTER — HOSPITAL ENCOUNTER (OUTPATIENT)
Dept: LAB | Facility: MEDICAL CENTER | Age: 59
End: 2019-05-02
Attending: FAMILY MEDICINE
Payer: MEDICARE

## 2019-05-02 DIAGNOSIS — B18.2 HEP C W/O COMA, CHRONIC (HCC): ICD-10-CM

## 2019-05-02 DIAGNOSIS — Z00.00 HEALTHCARE MAINTENANCE: ICD-10-CM

## 2019-05-02 DIAGNOSIS — E03.9 HYPOTHYROIDISM, UNSPECIFIED TYPE: ICD-10-CM

## 2019-05-02 LAB
ALBUMIN SERPL BCP-MCNC: 4 G/DL (ref 3.2–4.9)
ALBUMIN/GLOB SERPL: 1.4 G/DL
ALP SERPL-CCNC: 75 U/L (ref 30–99)
ALT SERPL-CCNC: 36 U/L (ref 2–50)
ANION GAP SERPL CALC-SCNC: 7 MMOL/L (ref 0–11.9)
AST SERPL-CCNC: 42 U/L (ref 12–45)
BASOPHILS # BLD AUTO: 0.5 % (ref 0–1.8)
BASOPHILS # BLD: 0.03 K/UL (ref 0–0.12)
BILIRUB SERPL-MCNC: 0.6 MG/DL (ref 0.1–1.5)
BUN SERPL-MCNC: 15 MG/DL (ref 8–22)
CALCIUM SERPL-MCNC: 9.5 MG/DL (ref 8.5–10.5)
CHLORIDE SERPL-SCNC: 109 MMOL/L (ref 96–112)
CHOLEST SERPL-MCNC: 177 MG/DL (ref 100–199)
CO2 SERPL-SCNC: 25 MMOL/L (ref 20–33)
COMMENT 1642: NORMAL
CREAT SERPL-MCNC: 1.02 MG/DL (ref 0.5–1.4)
EOSINOPHIL # BLD AUTO: 0.1 K/UL (ref 0–0.51)
EOSINOPHIL NFR BLD: 1.5 % (ref 0–6.9)
ERYTHROCYTE [DISTWIDTH] IN BLOOD BY AUTOMATED COUNT: 42.5 FL (ref 35.9–50)
FASTING STATUS PATIENT QL REPORTED: NORMAL
GLOBULIN SER CALC-MCNC: 2.9 G/DL (ref 1.9–3.5)
GLUCOSE SERPL-MCNC: 97 MG/DL (ref 65–99)
HCT VFR BLD AUTO: 47.4 % (ref 37–47)
HDLC SERPL-MCNC: 79 MG/DL
HGB BLD-MCNC: 16.3 G/DL (ref 12–16)
IMM GRANULOCYTES # BLD AUTO: 0.02 K/UL (ref 0–0.11)
IMM GRANULOCYTES NFR BLD AUTO: 0.3 % (ref 0–0.9)
LDLC SERPL CALC-MCNC: 84 MG/DL
LYMPHOCYTES # BLD AUTO: 1.93 K/UL (ref 1–4.8)
LYMPHOCYTES NFR BLD: 29.1 % (ref 22–41)
MCH RBC QN AUTO: 31.3 PG (ref 27–33)
MCHC RBC AUTO-ENTMCNC: 34.4 G/DL (ref 33.6–35)
MCV RBC AUTO: 91.2 FL (ref 81.4–97.8)
MONOCYTES # BLD AUTO: 0.33 K/UL (ref 0–0.85)
MONOCYTES NFR BLD AUTO: 5 % (ref 0–13.4)
MORPHOLOGY BLD-IMP: NORMAL
NEUTROPHILS # BLD AUTO: 4.22 K/UL (ref 2–7.15)
NEUTROPHILS NFR BLD: 63.6 % (ref 44–72)
NRBC # BLD AUTO: 0 K/UL
NRBC BLD-RTO: 0 /100 WBC
PLATELET # BLD AUTO: 189 K/UL (ref 164–446)
PMV BLD AUTO: 11.7 FL (ref 9–12.9)
POTASSIUM SERPL-SCNC: 4.1 MMOL/L (ref 3.6–5.5)
PROT SERPL-MCNC: 6.9 G/DL (ref 6–8.2)
RBC # BLD AUTO: 5.2 M/UL (ref 4.2–5.4)
SODIUM SERPL-SCNC: 141 MMOL/L (ref 135–145)
TRIGL SERPL-MCNC: 72 MG/DL (ref 0–149)
TSH SERPL DL<=0.005 MIU/L-ACNC: 4.21 UIU/ML (ref 0.38–5.33)
WBC # BLD AUTO: 6.6 K/UL (ref 4.8–10.8)

## 2019-05-02 PROCEDURE — 80053 COMPREHEN METABOLIC PANEL: CPT

## 2019-05-02 PROCEDURE — 36415 COLL VENOUS BLD VENIPUNCTURE: CPT

## 2019-05-02 PROCEDURE — 80061 LIPID PANEL: CPT

## 2019-05-02 PROCEDURE — 84443 ASSAY THYROID STIM HORMONE: CPT

## 2019-05-02 PROCEDURE — 85025 COMPLETE CBC W/AUTO DIFF WBC: CPT

## 2019-06-05 ENCOUNTER — HOSPITAL ENCOUNTER (EMERGENCY)
Facility: MEDICAL CENTER | Age: 59
End: 2019-06-05
Attending: EMERGENCY MEDICINE
Payer: MEDICARE

## 2019-06-05 VITALS
OXYGEN SATURATION: 93 % | DIASTOLIC BLOOD PRESSURE: 70 MMHG | HEART RATE: 78 BPM | HEIGHT: 63 IN | SYSTOLIC BLOOD PRESSURE: 130 MMHG | RESPIRATION RATE: 16 BRPM | WEIGHT: 171.96 LBS | BODY MASS INDEX: 30.47 KG/M2 | TEMPERATURE: 97 F

## 2019-06-05 DIAGNOSIS — L50.9 HIVES OF UNKNOWN ORIGIN: ICD-10-CM

## 2019-06-05 PROCEDURE — 700102 HCHG RX REV CODE 250 W/ 637 OVERRIDE(OP): Performed by: EMERGENCY MEDICINE

## 2019-06-05 PROCEDURE — A9270 NON-COVERED ITEM OR SERVICE: HCPCS | Performed by: EMERGENCY MEDICINE

## 2019-06-05 PROCEDURE — 700111 HCHG RX REV CODE 636 W/ 250 OVERRIDE (IP): Performed by: EMERGENCY MEDICINE

## 2019-06-05 PROCEDURE — 99283 EMERGENCY DEPT VISIT LOW MDM: CPT

## 2019-06-05 RX ORDER — CETIRIZINE HYDROCHLORIDE 10 MG/1
10 TABLET, CHEWABLE ORAL DAILY
Qty: 7 TAB | Refills: 0 | Status: SHIPPED | OUTPATIENT
Start: 2019-06-05 | End: 2019-06-12

## 2019-06-05 RX ORDER — DIPHENHYDRAMINE HCL 25 MG
50 TABLET ORAL ONCE
Status: COMPLETED | OUTPATIENT
Start: 2019-06-05 | End: 2019-06-05

## 2019-06-05 RX ORDER — DEXAMETHASONE SODIUM PHOSPHATE 10 MG/ML
10 INJECTION, SOLUTION INTRAMUSCULAR; INTRAVENOUS ONCE
Status: COMPLETED | OUTPATIENT
Start: 2019-06-05 | End: 2019-06-05

## 2019-06-05 RX ADMIN — DIPHENHYDRAMINE HCL 50 MG: 25 TABLET ORAL at 01:45

## 2019-06-05 RX ADMIN — DEXAMETHASONE SODIUM PHOSPHATE 10 MG: 10 INJECTION INTRAMUSCULAR; INTRAVENOUS at 01:45

## 2019-06-05 NOTE — ED TRIAGE NOTES
C/o of hives/rash on limbs and torso.   Airway patent, speaking in full sentences, has had for 2 days.  Treated with cold showers at home no relief.

## 2019-06-05 NOTE — DISCHARGE INSTRUCTIONS
Take the zyrtec daily   Use benadryl 50 mg every 6 hours as needed for severe itching  You can also use calamine lotion or oatmeal baths for the discomfort     Return to the ED for any new or worsening chest pain, difficulty breathing facial or oral swelling

## 2019-06-05 NOTE — ED PROVIDER NOTES
ED Provider Note    CHIEF COMPLAINT  Chief Complaint   Patient presents with   • Hives       HPI  Khaliad Delaney is a 58 y.o. female who presents to the emergency department chief complaint of hives.  They began yesterday at 4 PM she states she drank a juice with some different types of food in it that she is had before never had any problems and then she was painting and the hives began somewhere in the middle of all back.  She denies any difficulty breathing or nausea or vomiting facial swelling or throat swelling.  She has tried cold showers but nothing else for the hives because she did not know what she could do.  She describes a lot of itching    REVIEW OF SYSTEMS  Positives as above. Pertinent negatives include nausea vomiting shortness of breath wheezing facial or throat swelling  All other review of systems are negative    PAST MEDICAL HISTORY   has a past medical history of Arthritis; Back pain; Bipolar 2 disorder; Bipolar disorder (HCC); Chronic LBP; Chronic neck pain; Chronic neck pain; Depression; Hep C w/o coma, chronic (HCC); HTN (hypertension) (6/2/2016); Melanosis coli (4/20/2017); Shoulder pain, right; Simple cyst of kidney right (6/1/2012); and Tobacco dependence (11/2/2016).    SOCIAL HISTORY  Social History     Social History Main Topics   • Smoking status: Current Every Day Smoker     Packs/day: 0.25     Years: 35.00     Types: Cigarettes   • Smokeless tobacco: Never Used      Comment: smokes  3 cigs/day   • Alcohol use Yes      Comment: used to drink heavily, quit 1998   • Drug use: No      Comment: 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 Quit in 2004   • Sexual activity: Yes     Partners: Male       SURGICAL HISTORY   has a past surgical history that includes primary c section and appendectomy.    CURRENT MEDICATIONS  Home Medications    **Home medications have not yet been reviewed for this encounter**         ALLERGIES  Allergies   Allergen Reactions   • Nkda [No Known Drug  "Allergy]        PHYSICAL EXAM  VITAL SIGNS: /86   Pulse 97   Temp 36.1 °C (97 °F) (Temporal)   Resp 16   Ht 1.6 m (5' 3\")   Wt 78 kg (171 lb 15.3 oz)   SpO2 94%   BMI 30.46 kg/m²    Pulse ox interpretation: I interpret this pulse ox as normal.  Constitutional: Alert in no apparent distress.  HENT: Normocephalic, Atraumatic, MMM, oropharynx clear without uvular edema no facial edema  Eyes: PERound. Conjunctiva normal, non-icteric.   Heart: Regular rate and rhythm, no murmurs.    Lungs: Clear to auscultation bilaterally. No resp distress, breath sounds equal  Abdomen: Non-tender, non-distended, normal bowel sounds  Skin: Warm, Dry, No erythema, diffuse papular lesions with central clearing consistent with wheals and hives over the chest arms back and legs nothing involving the face  Neurologic: Alert and oriented, Grossly non-focal.       DIFFERENTIAL DIAGNOSIS AND WORK UP PLAN    This is a 58 y.o. female who presents with signs and symptoms consistent with hives without signs or symptoms of anaphylaxis.  Because were unsure if this was something she inhaled or some she came in contact her 8.  She was given a dose of dexamethasone here in the ED she will also be given some Benadryl.  I wrote her for some cetirizine daily for the next few days and Benadryl as needed for any new or worsening symptoms.  She is to come back to the ED for signs of difficulty breathing or facial swelling nausea or vomiting.  At this time does not appear to have any attributes of anaphylaxis    /70   Pulse 78   Temp 36.1 °C (97 °F) (Temporal)   Resp 16   Ht 1.6 m (5' 3\")   Wt 78 kg (171 lb 15.3 oz)   SpO2 93%   BMI 30.46 kg/m²      The patient will return for new or worsening symptoms and is stable at the time of discharge.    The patient is referred to a primary physician for blood pressure management, diabetic screening, and for all other preventative health concerns.    DISPOSITION:  Patient will be discharged " home in stable condition.    FOLLOW UP:  Maury Pandey M.D.  [address]  [address]  [place] [address]  [phone]    Schedule an appointment as soon as possible for a visit       [facility], Emergency Dept  [address]  [place] [id number] [phone]    If symptoms worsen      OUTPATIENT MEDICATIONS:  Discharge Medication List as of 6/5/2019  1:54 AM      START taking these medications    Details   cetirizine (ZYRTEC) 10 MG chewable tablet Take 1 Tab by mouth every day for 7 days., Disp-7 Tab, R-0, Normal               FINAL IMPRESSION  1. Hives of unknown origin          Electronically signed by: Vilma Lee, 6/5/2019 1:25 AM    This dictation has been created using voice recognition software and/or scribes. The accuracy of the dictation is limited by the abilities of the software and the expertise of the scribes. I expect there may be some errors of grammar and possibly content. I made every attempt to manually correct the errors within my dictation. However, errors related to voice recognition software and/or scribes may still exist and should be interpreted within the appropriate context.

## 2019-06-05 NOTE — ED NOTES
Patient verbalized understanding of discharge instructions, provided with discharge paperwork, gait steady, ambulated independently to JOSE GUADALUPE jorgensen.

## 2019-06-07 ENCOUNTER — HOSPITAL ENCOUNTER (EMERGENCY)
Facility: MEDICAL CENTER | Age: 59
End: 2019-06-07
Attending: EMERGENCY MEDICINE
Payer: MEDICARE

## 2019-06-07 VITALS
HEIGHT: 63 IN | OXYGEN SATURATION: 94 % | TEMPERATURE: 97.6 F | BODY MASS INDEX: 30.78 KG/M2 | SYSTOLIC BLOOD PRESSURE: 121 MMHG | WEIGHT: 173.72 LBS | HEART RATE: 84 BPM | RESPIRATION RATE: 16 BRPM | DIASTOLIC BLOOD PRESSURE: 71 MMHG

## 2019-06-07 DIAGNOSIS — L50.9 URTICARIA: ICD-10-CM

## 2019-06-07 PROCEDURE — 99281 EMR DPT VST MAYX REQ PHY/QHP: CPT

## 2019-06-07 RX ORDER — PREDNISONE 20 MG/1
40 TABLET ORAL DAILY
Qty: 12 TAB | Refills: 0 | Status: SHIPPED | OUTPATIENT
Start: 2019-06-07 | End: 2019-06-13

## 2019-06-07 NOTE — ED NOTES
Ambulatory to 64 with same report as triage note.  Red raised areas noted to groin, BLE, feet, right shoulder.  Reports no relief with Benadryl.  Reports + itch.  Denies fever, denies any other household members with same symptoms

## 2019-06-07 NOTE — ED PROVIDER NOTES
ED Provider Note    Scribed for Luli Pryor M.D. by Bhavya Handy. 6/7/2019  8:49 AM    Primary care provider: Maury Pandey M.D.  Means of arrival: Walk-In  History obtained from: Patient  History limited by: None    CHIEF COMPLAINT  Chief Complaint   Patient presents with   • Rash       HPI  Khalida Delaney is a 58 y.o. female who presents to the Emergency Department for evaluation of a worsening rash onset 5 days ago. Rash is located on her inner bilateral legs, groin area, and abdomen. Patient states rash worsened last night and she noticed rash has spread lower down her legs. Patient endorses associated itchiness, but denies any swelling in throat, shortness of breath, joint pain, joing sweling, eye drainage, eye itchiness. Patient states she saw her PCP and was prescribed one dose of steroids, however rash continues to persist. She has taken Benadryl and zyrtec for alleviating measures. She additionally reports she did not wash her clothes in two weeks due to moving. Patient states she noticed rash occurred on the same day she used old paint to paint a board      REVIEW OF SYSTEMS  HEENT:  No ear pain, congestion, or sore throat   EYES: no discharge, itchiness, redness, or vision changes  PULMONARY: no dyspnea, cough, or congestion   Musculoskeletal: no swelling, deformity, pain, or joint swelling  SKIN: Itchiness, Rash on inner bilateral legs, groin area, and abdomen, No contusions     See history of present illness. E.    PAST MEDICAL HISTORY   has a past medical history of Arthritis; Back pain; Bipolar 2 disorder; Bipolar disorder (HCC); Chronic LBP; Chronic neck pain; Chronic neck pain; Depression; Hep C w/o coma, chronic (HCC); HTN (hypertension) (6/2/2016); Melanosis coli (4/20/2017); Shoulder pain, right; Simple cyst of kidney right (6/1/2012); and Tobacco dependence (11/2/2016).    SURGICAL HISTORY   has a past surgical history that includes primary c section and appendectomy.    SOCIAL  "HISTORY  Social History   Substance Use Topics   • Smoking status: Current Every Day Smoker     Packs/day: 0.25     Years: 35.00     Types: Cigarettes   • Smokeless tobacco: Never Used      Comment: smokes  3 cigs/day   • Alcohol use Yes      Comment: used to drink heavily, quit 1998      History   Drug Use No     Comment: 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 Quit in 2004       FAMILY HISTORY  Family History   Problem Relation Age of Onset   • Cancer Sister 44        breast and cervical/ovarian   • Hypertension Mother    • Diabetes Mother    • Cancer Mother         bone/brain cancer   • Genitourinary () Sister         fibroid tumors   • Stroke Maternal Grandmother    • Stroke Maternal Grandfather    • Heart Disease Maternal Grandfather    • Lung Disease Neg Hx    • Hyperlipidemia Neg Hx        CURRENT MEDICATIONS  Home Medications     Reviewed by Genie Lepe R.N. (Registered Nurse) on 06/07/19 at 0847  Med List Status: Complete   Medication Last Dose Status   alprazolam (XANAX) 1 MG Tab 6/6/2019 Active   busPIRone (BUSPAR) 10 MG Tab tablet 6/7/2019 Active   cetirizine (ZYRTEC) 10 MG chewable tablet  Active   Cholecalciferol 4000 units Cap  Active   lamotrigine (LAMICTAL) 150 MG tablet 6/7/2019 Active   METHADONE HCL PO 6/7/2019 Active   Multiple Vitamins-Minerals (MULTIVITAMIN PO)  Active   ziprasidone (GEODON) 80 MG Cap 6/6/2019 Active                ALLERGIES  Allergies   Allergen Reactions   • Nkda [No Known Drug Allergy]        PHYSICAL EXAM  VITAL SIGNS: /71   Pulse 84   Temp 36.4 °C (97.6 °F) (Temporal)   Resp 16   Ht 1.6 m (5' 3\")   Wt 78.8 kg (173 lb 11.6 oz)   SpO2 94%   BMI 30.77 kg/m²     Constitutional: Well developed, Well nourished, No acute distress, Non-toxic appearance.   HEENT: Normocephalic, Atraumatic,  external ears normal, pharynx pink,  Mucous  Membranes moist, No rhinorrhea or mucosal edema  Eyes: PERRL, EOMI, Conjunctiva normal, No discharge.   Thorax & Lungs: " Lungs clear to auscultation bilaterally, No respiratory distress, No wheezes, rhales or rhonchi, No chest wall tenderness.   Skin: Urticarial rash on the inside of bilateral thigh things, ankle, outside of buttocks, and abdomen, Warm, Dry  Extremities: Equal, intact distal pulses, No cyanosis, No tenderness.   Musculoskeletal: No joint pain, Good range of motion in all major joints. No tenderness to palpation or major deformities noted.   Neurologic: Alert & awake, no focal deficits  Psychiatric: Affect normal      COURSE & MEDICAL DECISION MAKING  Nursing notes, VS, PMSFHx reviewed in chart.     8:49 AM - Patient seen and examined at bedside. I informed patient that she will be prescribed a 6 days course of Prednisone to treat rash. She was recommended to continue taking Benadryl and zyrtec. I recommended patient to have an allergy test in the future done by her regular PCP. Patient was advised to follow up with regular PCP. The patient will return for new or worsening symptoms and is stable at the time of discharge.    DISPOSITION:  Patient will be discharged home in stable condition.    FOLLOW UP:  Maury Pandey M.D.  [address] [place] [id number] [phone]    Call in 3 days  for recheck      OUTPATIENT MEDICATIONS:  New Prescriptions    PREDNISONE (DELTASONE) 20 MG TAB    Take 2 Tabs by mouth every day for 6 days.        FINAL IMPRESSION  1. Urticaria          Bhavya HAMILTON (Reynaldoibpillo), am scribing for, and in the presence of, Luli Pryor M.D..    Electronically signed by: Bhavya Handy (Reynaldoibpillo), 6/7/2019    Luli HAMILTON M.D. personally performed the services described in this documentation, as scribed by Bhavya Handy in my presence, and it is both accurate and complete. E    The note accurately reflects work and decisions made by me.  Luli Pryor  6/7/2019  2:59 PM

## 2019-06-07 NOTE — ED TRIAGE NOTES
Ambulates to triage  Chief Complaint   Patient presents with   • Rash     Was seen here on 6/5 for same, given benadryl and steroids, feels like the rash is spreading.

## 2019-06-07 NOTE — ED NOTES
Unable to locate pt for d/c instructions.  Gown on gurney, no personal items found in room.  Assumed to have left prior to instructions or Rx review

## 2019-07-09 ENCOUNTER — OFFICE VISIT (OUTPATIENT)
Dept: MEDICAL GROUP | Facility: MEDICAL CENTER | Age: 59
End: 2019-07-09
Attending: FAMILY MEDICINE
Payer: MEDICARE

## 2019-07-09 VITALS
BODY MASS INDEX: 29.41 KG/M2 | HEIGHT: 63 IN | OXYGEN SATURATION: 93 % | WEIGHT: 166 LBS | SYSTOLIC BLOOD PRESSURE: 108 MMHG | RESPIRATION RATE: 16 BRPM | TEMPERATURE: 99.7 F | HEART RATE: 66 BPM | DIASTOLIC BLOOD PRESSURE: 64 MMHG

## 2019-07-09 DIAGNOSIS — R94.4 DECREASED GFR: ICD-10-CM

## 2019-07-09 DIAGNOSIS — B18.2 HEP C W/O COMA, CHRONIC (HCC): ICD-10-CM

## 2019-07-09 DIAGNOSIS — R19.5 POSITIVE FIT (FECAL IMMUNOCHEMICAL TEST): ICD-10-CM

## 2019-07-09 DIAGNOSIS — D58.2 ELEVATED HEMOGLOBIN (HCC): ICD-10-CM

## 2019-07-09 DIAGNOSIS — H61.91 SKIN LESION OF RIGHT EAR: ICD-10-CM

## 2019-07-09 PROCEDURE — 99213 OFFICE O/P EST LOW 20 MIN: CPT | Performed by: FAMILY MEDICINE

## 2019-07-09 PROCEDURE — 99214 OFFICE O/P EST MOD 30 MIN: CPT | Performed by: FAMILY MEDICINE

## 2019-07-09 RX ORDER — SULFAMETHOXAZOLE AND TRIMETHOPRIM 800; 160 MG/1; MG/1
TABLET ORAL
Refills: 0 | COMMUNITY
Start: 2019-05-28 | End: 2019-07-09

## 2019-07-09 RX ORDER — METHADONE HYDROCHLORIDE 10 MG/1
TABLET ORAL
Refills: 0 | COMMUNITY
Start: 2019-07-02 | End: 2019-07-09

## 2019-07-09 RX ORDER — METHADONE HYDROCHLORIDE 10 MG/1
TABLET ORAL
Refills: 0 | COMMUNITY
Start: 2019-05-06 | End: 2019-07-09

## 2019-07-09 RX ORDER — AMOXICILLIN 500 MG/1
CAPSULE ORAL
Refills: 0 | COMMUNITY
Start: 2019-06-18 | End: 2019-07-09

## 2019-07-09 RX ORDER — IBUPROFEN 800 MG/1
800 TABLET ORAL EVERY 6 HOURS PRN
Refills: 0 | COMMUNITY
Start: 2019-06-18 | End: 2021-01-06

## 2019-07-09 ASSESSMENT — ENCOUNTER SYMPTOMS
TINGLING: 1
NAUSEA: 0
HALLUCINATIONS: 0
NERVOUS/ANXIOUS: 1
COUGH: 0
BACK PAIN: 1
NECK PAIN: 0
SPEECH CHANGE: 0
FEVER: 0
DEPRESSION: 1
SPUTUM PRODUCTION: 0
INSOMNIA: 0
VOMITING: 0
SENSORY CHANGE: 0
ABDOMINAL PAIN: 0
HEADACHES: 0
PALPITATIONS: 0
FOCAL WEAKNESS: 0
SHORTNESS OF BREATH: 0
CHILLS: 0
TREMORS: 0

## 2019-07-09 ASSESSMENT — LIFESTYLE VARIABLES: SUBSTANCE_ABUSE: 0

## 2019-07-09 NOTE — PROGRESS NOTES
Subjective:      Khalida Delaney is a 59 y.o. female who presents with Results (labs)            Patient 59-year-old female here for follow-up of her recent blood work.  She has a past history of a positive fit test, hepatitis C positive, and a skin lesion on her right ear.    Patient states that she has had a sore that has been recurrent on her right ear for some time now.  She never had the lesion checked in the past, but is concerned because she does have a family history of skin cancer in her mother.  Due to the suspicious nature of the lesion returning in healing and returning once again, a referral to a dermatologist will be made today for a further evaluation.  Discussed the possibility that this could be actinic keratosis, or a squamous cell carcinoma of her right ear or other unknown skin lesion.  We will continue to follow.    She also had blood work done recently with the following results:    5/2/2019 09:28  WBC: 6.6  RBC: 5.20  Hemoglobin: 16.3 (H)  Hematocrit: 47.4 (H)  MCV: 91.2  MCH: 31.3  MCHC: 34.4  RDW: 42.5  Platelet Count: 189  MPV: 11.7  Neutrophils-Polys: 63.60  Neutrophils (Absolute): 4.22  Lymphocytes: 29.10  Lymphs (Absolute): 1.93  Monocytes: 5.00  Monos (Absolute): 0.33  Eosinophils: 1.50  Eos (Absolute): 0.10  Basophils: 0.50  Baso (Absolute): 0.03  Immature Granulocytes: 0.30  Immature Granulocytes (abs): 0.02  Nucleated RBC: 0.00  NRBC (Absolute): 0.00  Comments-Diff: see below  Peripheral Smear Review: see below  Sodium: 141  Potassium: 4.1  Chloride: 109  Co2: 25  Anion Gap: 7.0  Glucose: 97  Bun: 15  Creatinine: 1.02  GFR If : >60  GFR If Non : 55 (A)  Calcium: 9.5  AST(SGOT): 42  ALT(SGPT): 36  Alkaline Phosphatase: 75  Total Bilirubin: 0.6  Albumin: 4.0  Total Protein: 6.9  Globulin: 2.9  A-G Ratio: 1.4  Fasting Status: Fasting  Cholesterol,Tot: 177  Triglycerides: 72  HDL: 79  LDL: 84  TSH: 4.210    Her GFR was slightly decreased at 55.  But  her previous GFR on a recent blood work was within normal limits.  We will have her repeat her metabolic panel to recheck her kidney function.  She has been advised to stay well-hydrated prior to getting her blood work done.  We will continue to follow.    She does have a history of hepatitis C antibody in the past.  We will recheck her hepatitis C antibody as well as a viral load.  Patient has also been referred back to gastroenterology since she also has a history of a positive fit screen which had never been followed up with a colonoscopy.  Patient states that she had been prepped for a colonoscopy but was unable to complete the test due to her inability to tolerate the exam.  We will refer her back to GI for both a further evaluation of her hepatitis C as well as a colonoscopy.  We will continue to follow.    She was also noted to have a elevated hemoglobin and hematocrit.  Patient is a smoker.  We will have her repeat a complete blood count to recheck her hemoglobin hematocrit.  Discussed the possibility that her smoking may be related to this increase in her numbers.  If she continues to have an elevated hemoglobin hematocrit will also order a ferritin and iron study as well if her numbers continue to rise.  We will continue to follow.     Current medications, allergies, and problem list reviewed with patient and updated in [facility].                Review of Systems   Constitutional: Negative for chills and fever.   HENT: Negative for hearing loss and tinnitus.    Respiratory: Negative for cough, sputum production and shortness of breath.    Cardiovascular: Negative for chest pain and palpitations.   Gastrointestinal: Negative for abdominal pain, nausea and vomiting.   Musculoskeletal: Positive for back pain. Negative for joint pain and neck pain.   Skin: Negative for rash.   Neurological: Positive for tingling. Negative for tremors, sensory change, speech change, focal weakness and headaches.  "  Psychiatric/Behavioral: Positive for depression. Negative for hallucinations, substance abuse and suicidal ideas. The patient is nervous/anxious. The patient does not have insomnia.           Objective:     /64 (BP Location: Left arm, Patient Position: Sitting, BP Cuff Size: Adult)   Pulse 66   Temp 37.6 °C (99.7 °F) (Temporal)   Resp 16   Ht 1.6 m (5' 3\")   Wt 75.3 kg (166 lb)   SpO2 93%   BMI 29.41 kg/m²      Physical Exam   Constitutional: She is oriented to person, place, and time.   BMI 29.41   HENT:   Head: Normocephalic and atraumatic.   Cardiovascular: Normal rate, regular rhythm and normal heart sounds.  Exam reveals no friction rub.    No murmur heard.  Pulmonary/Chest: Effort normal and breath sounds normal. No respiratory distress. She has no wheezes. She has no rales.   Abdominal: Soft. Bowel sounds are normal. She exhibits no distension. There is no tenderness.   Neurological: She is alert and oriented to person, place, and time.   Skin: Skin is warm and dry.   Psychiatric: She has a normal mood and affect. Her behavior is normal.   Nursing note and vitals reviewed.              Assessment/Plan:     1. Skin lesion of right ear  We will have patient referred to a dermatologist for a further evaluation of her skin lesion on her right ear.  We will continue to follow.  -  MG Tab; Take 800 mg by mouth every 6 hours as needed.; Refill: 0  - REFERRAL TO DERMATOLOGY    2. Hep C w/o coma, chronic (HCC)  We will order a repeat hep C antibody as well as a viral load.  She had a previous ultrasound of her liver which showed steatosis of her liver as well as hepatomegaly.  A referral to GI will also be made today for a further evaluation of her hepatitis C status as well as a positive fit screen in the past.  We will continue to follow.  - REFERRAL TO GASTROENTEROLOGY  - HEP C VIRUS ANTIBODY; Future  - HEPATITIS C RNA QUANT.; Future    3. Positive FIT (fecal immunochemical test)  See above " plan.  - REFERRAL TO GASTROENTEROLOGY    4. Decreased GFR  We will have patient stay well-hydrated prior to her next blood work.  We will recheck her metabolic function.  We will continue to follow.

## 2019-09-06 ENCOUNTER — APPOINTMENT (OUTPATIENT)
Dept: RADIOLOGY | Facility: MEDICAL CENTER | Age: 59
End: 2019-09-06
Attending: EMERGENCY MEDICINE
Payer: OTHER MISCELLANEOUS

## 2019-09-06 ENCOUNTER — HOSPITAL ENCOUNTER (EMERGENCY)
Facility: MEDICAL CENTER | Age: 59
End: 2019-09-06
Attending: EMERGENCY MEDICINE
Payer: OTHER MISCELLANEOUS

## 2019-09-06 VITALS
RESPIRATION RATE: 16 BRPM | TEMPERATURE: 97.7 F | BODY MASS INDEX: 29.22 KG/M2 | OXYGEN SATURATION: 92 % | DIASTOLIC BLOOD PRESSURE: 64 MMHG | SYSTOLIC BLOOD PRESSURE: 135 MMHG | HEIGHT: 63 IN | HEART RATE: 54 BPM | WEIGHT: 164.9 LBS
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
## 2019-09-06 PROCEDURE — A9270 NON-COVERED ITEM OR SERVICE: HCPCS | Performed by: EMERGENCY MEDICINE

## 2019-09-06 PROCEDURE — 71045 X-RAY EXAM CHEST 1 VIEW: CPT

## 2019-09-06 PROCEDURE — 73030 X-RAY EXAM OF SHOULDER: CPT | Mod: LT

## 2019-09-06 PROCEDURE — 700102 HCHG RX REV CODE 250 W/ 637 OVERRIDE(OP): Performed by: EMERGENCY MEDICINE

## 2019-09-06 PROCEDURE — 99284 EMERGENCY DEPT VISIT MOD MDM: CPT

## 2019-09-06 RX ORDER — IBUPROFEN 600 MG/1
600 TABLET ORAL ONCE
Status: COMPLETED | OUTPATIENT
Start: 2019-09-06 | End: 2019-09-06

## 2019-09-06 RX ADMIN — IBUPROFEN 600 MG: 600 TABLET ORAL at 07:13
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
## 2019-09-06 NOTE — ED NOTES
Pt. Discharged home at this time, pt. Ambulated unassisted to ER waiting room, pt. Denies questions or needs at this time, pt. Given discharge paperwork.

## 2019-09-06 NOTE — ED NOTES
Pt. Laying in bed watching television, pt. Denies needs at this time, pt. Safety precaution in place, will continue to monitor.

## 2019-09-09 ENCOUNTER — HOSPITAL ENCOUNTER (EMERGENCY)
Facility: MEDICAL CENTER | Age: 59
End: 2019-09-09
Attending: EMERGENCY MEDICINE
Payer: OTHER MISCELLANEOUS

## 2019-09-09 ENCOUNTER — APPOINTMENT (OUTPATIENT)
Dept: RADIOLOGY | Facility: MEDICAL CENTER | Age: 59
End: 2019-09-09
Attending: EMERGENCY MEDICINE
Payer: OTHER MISCELLANEOUS

## 2019-09-09 VITALS
HEIGHT: 63 IN | DIASTOLIC BLOOD PRESSURE: 76 MMHG | RESPIRATION RATE: 16 BRPM | HEART RATE: 60 BPM | TEMPERATURE: 97.3 F | BODY MASS INDEX: 29.49 KG/M2 | SYSTOLIC BLOOD PRESSURE: 136 MMHG | OXYGEN SATURATION: 96 % | WEIGHT: 166.45 LBS

## 2019-09-09 DIAGNOSIS — S20.211A CONTUSION OF RIGHT CHEST WALL, INITIAL ENCOUNTER: ICD-10-CM

## 2019-09-09 PROCEDURE — 71046 X-RAY EXAM CHEST 2 VIEWS: CPT

## 2019-09-09 PROCEDURE — A9270 NON-COVERED ITEM OR SERVICE: HCPCS | Performed by: EMERGENCY MEDICINE

## 2019-09-09 PROCEDURE — 99284 EMERGENCY DEPT VISIT MOD MDM: CPT

## 2019-09-09 PROCEDURE — 700102 HCHG RX REV CODE 250 W/ 637 OVERRIDE(OP): Performed by: EMERGENCY MEDICINE

## 2019-09-09 RX ORDER — HYDROCODONE BITARTRATE AND ACETAMINOPHEN 5; 325 MG/1; MG/1
1 TABLET ORAL ONCE
Status: COMPLETED | OUTPATIENT
Start: 2019-09-09 | End: 2019-09-09

## 2019-09-09 RX ADMIN — HYDROCODONE BITARTRATE AND ACETAMINOPHEN 1 TABLET: 5; 325 TABLET ORAL at 12:01

## 2019-09-09 ASSESSMENT — LIFESTYLE VARIABLES
DOES PATIENT WANT TO STOP DRINKING: NO
DO YOU DRINK ALCOHOL: NO
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
## 2020-03-25 ENCOUNTER — OFFICE VISIT (OUTPATIENT)
Dept: MEDICAL GROUP | Facility: MEDICAL CENTER | Age: 60
End: 2020-03-25
Attending: NURSE PRACTITIONER
Payer: MEDICARE

## 2020-03-25 VITALS
SYSTOLIC BLOOD PRESSURE: 122 MMHG | TEMPERATURE: 98.4 F | DIASTOLIC BLOOD PRESSURE: 68 MMHG | WEIGHT: 169 LBS | OXYGEN SATURATION: 95 % | BODY MASS INDEX: 29.95 KG/M2 | HEART RATE: 68 BPM | HEIGHT: 63 IN

## 2020-03-25 DIAGNOSIS — Z12.31 ENCOUNTER FOR SCREENING MAMMOGRAM FOR MALIGNANT NEOPLASM OF BREAST: ICD-10-CM

## 2020-03-25 DIAGNOSIS — Z76.89 ENCOUNTER TO ESTABLISH CARE: ICD-10-CM

## 2020-03-25 DIAGNOSIS — B18.2 HEP C W/O COMA, CHRONIC (HCC): ICD-10-CM

## 2020-03-25 DIAGNOSIS — Z13.21 ENCOUNTER FOR VITAMIN DEFICIENCY SCREENING: ICD-10-CM

## 2020-03-25 DIAGNOSIS — Z12.31 ENCOUNTER FOR SCREENING MAMMOGRAM FOR BREAST CANCER: ICD-10-CM

## 2020-03-25 DIAGNOSIS — H61.91 LESION OF RIGHT EXTERNAL EAR: ICD-10-CM

## 2020-03-25 DIAGNOSIS — Z13.228 SCREENING FOR METABOLIC DISORDER: ICD-10-CM

## 2020-03-25 PROBLEM — D58.2 ELEVATED HEMOGLOBIN (HCC): Status: ACTIVE | Noted: 2020-03-25

## 2020-03-25 PROCEDURE — 99214 OFFICE O/P EST MOD 30 MIN: CPT | Performed by: NURSE PRACTITIONER

## 2020-03-25 PROCEDURE — 99213 OFFICE O/P EST LOW 20 MIN: CPT | Performed by: NURSE PRACTITIONER

## 2020-03-25 RX ORDER — ALPRAZOLAM 2 MG/1
2 TABLET ORAL 3 TIMES DAILY
COMMUNITY
Start: 2020-03-11

## 2020-03-25 RX ORDER — BUSPIRONE HYDROCHLORIDE 30 MG/1
TABLET ORAL
COMMUNITY
Start: 2020-01-17 | End: 2020-03-25

## 2020-03-25 RX ORDER — METHADONE HYDROCHLORIDE 10 MG/1
30 TABLET ORAL 2 TIMES DAILY
COMMUNITY
Start: 2020-03-18

## 2020-03-25 ASSESSMENT — ENCOUNTER SYMPTOMS
CONSTIPATION: 0
BLOOD IN STOOL: 0
WEIGHT LOSS: 0
ABDOMINAL PAIN: 0
WHEEZING: 0
SHORTNESS OF BREATH: 0
CHILLS: 0
FEVER: 0
COUGH: 0
PALPITATIONS: 0
DIARRHEA: 0

## 2020-03-25 ASSESSMENT — FIBROSIS 4 INDEX: FIB4 SCORE: 2.222222222222222222

## 2020-03-25 NOTE — PROGRESS NOTES
Chief Complaint   Patient presents with   • Bump     r side left ear   • Paperwork       Subjective:     HPI:   Khalida Delaney is a 59 y.o. female here to establish care, ear lesion,  and to discuss the evaluation and management of:      Encounter to establish care  Prior PCP:Kaylie Pandey    Other Providers:  Pain- Lorie  Psych- Blanca Andrews  GYN- cannot remember    Hep C w/o coma, chronic GENOTYPE 1A OR 1B  Dx 30 years ago.  She did INH for one year.  She is interested in tx.     Lesion of right external ear  Present for years.  It forms an ulcer and heals, then repeats.  Sometimes it is painful when she sleeps on it.        ROS  Review of Systems   Constitutional: Negative for chills, fever, malaise/fatigue and weight loss.   Respiratory: Negative for cough, shortness of breath and wheezing.    Cardiovascular: Negative for chest pain, palpitations and leg swelling.   Gastrointestinal: Negative for abdominal pain, blood in stool, constipation and diarrhea.         Allergies   Allergen Reactions   • Nkda [No Known Drug Allergy]        Current medicines (including changes today)  Current Outpatient Medications   Medication Sig Dispense Refill   • methadone (DOLOPHINE) 10 MG Tab Take 70 mg by mouth every day.     • Cholecalciferol (VITAMIN D3) 2000 UNIT Cap TAKE 2 CAPSULES BY MOUTH EVERY  Cap 1   •  MG Tab Take 800 mg by mouth every 6 hours as needed.  0   • lamotrigine (LAMICTAL) 150 MG tablet 150 mg every day.     • ziprasidone (GEODON) 80 MG Cap Take 160 mg by mouth every bedtime.  1   • Multiple Vitamins-Minerals (MULTIVITAMIN PO) Take 1 Tab by mouth every day at 6 PM.     • alprazolam (XANAX) 2 MG tablet Take 2 mg by mouth 3 times a day.     • busPIRone (BUSPAR) 10 MG Tab tablet Take 20 mg by mouth 2 Times a Day.       No current facility-administered medications for this visit.      She  has a past medical history of Arthritis, Back pain, Bipolar 2 disorder, Chronic LBP, Chronic neck pain,  Depression, Hep C w/o coma, chronic (HCC), HTN (hypertension) (2016), Melanosis coli (2017), Shoulder pain, right, Simple cyst of kidney right (2012), and Tobacco dependence (2016).  She  has a past surgical history that includes primary c section and appendectomy.  Social History     Tobacco Use   • Smoking status: Current Every Day Smoker     Years: 35.00   • Smokeless tobacco: Never Used   • Tobacco comment: pt vapes   Substance Use Topics   • Alcohol use: No     Comment: used to drink heavily, quit    • Drug use: No     Comment: 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 Quit in        Family History   Problem Relation Age of Onset   • Cancer Sister 44        breast and cervical/ovarian   • Hypertension Mother    • Diabetes Mother    • Cancer Mother         bone/brain cancer   • No Known Problems Father    • Genitourinary () Problems Sister         fibroid tumors   • Stroke Maternal Grandmother    • Stroke Maternal Grandfather    • Heart Disease Maternal Grandfather    • Lung Disease Neg Hx    • Hyperlipidemia Neg Hx      Family Status   Relation Name Status   • Sis  Alive   • Mo     • Fa  Alive   • Sis  Alive   • MGMo  (Not Specified)   • MGFa  (Not Specified)   • Neg Hx  (Not Specified)       Patient Active Problem List    Diagnosis Date Noted   • Encounter to establish care 2020   • Elevated hemoglobin (HCC) 2020   • Positive FIT (fecal immunochemical test) 2018   • Lesion of right external ear 2018   • Fracture of distal end of radius 2018   • Assistance with transportation 2018   • Rib pain on left side 2018   • Overweight (BMI 25.0-29.9) 2017   • Melanosis coli 2017   • Family history of breast cancer in sister 2016   • Tobacco dependence 2016   • HTN (hypertension) 2016   • Opioid type dependence, continuous (HCC) 2016   • Osteopenia 2013   • Simple cyst of kidney right 2012   •  "Insomnia 01/06/2012   • Arthritis    • Chronic neck pain    • Shoulder pain, right    • Chronic low back pain    • Hep C w/o coma, chronic GENOTYPE 1A OR 1B           Objective:     /68 (BP Location: Left arm, Patient Position: Sitting, BP Cuff Size: Adult)   Pulse 68   Temp 36.9 °C (98.4 °F) (Temporal)   Ht 1.6 m (5' 3\")   Wt 76.7 kg (169 lb)   SpO2 95%  Body mass index is 29.94 kg/m².    Physical Exam:  Physical Exam   Constitutional: She is oriented to person, place, and time and well-developed, well-nourished, and in no distress. No distress.   HENT:   Head: Normocephalic.   Right Ear: Tympanic membrane and external ear normal.   Left Ear: Tympanic membrane and external ear normal.   Eyes: Pupils are equal, round, and reactive to light. Conjunctivae and EOM are normal.   Neck: Normal range of motion. Neck supple. No tracheal deviation present.   Cardiovascular: Normal rate, regular rhythm, normal heart sounds and intact distal pulses.   Pulmonary/Chest: Effort normal and breath sounds normal.   Abdominal: Soft. Bowel sounds are normal.   Musculoskeletal: Normal range of motion.   Lymphadenopathy:        Head (right side): No preauricular adenopathy present.        Head (left side): No preauricular adenopathy present.     She has no cervical adenopathy.   Neurological: She is alert and oriented to person, place, and time. She has normal sensation, normal strength and intact cranial nerves. Gait normal.   Skin: Skin is warm and dry.   Right ear- approximately 3 mm flat lesion.  White flaky with underlying redness.  No ulceration present today.   Psychiatric: Affect and judgment normal.          Assessment and Plan:     The following treatment plan was discussed:    1. Lesion of right external ear  REFERRAL TO ENT  -Chronic problem, unstable.  Suspicious lesion, suspicious for actinic keratosis versus squamous cell carcinoma.  I have referred her to ENT for biopsy and further evaluation treatment.   2. " Hep C w/o coma, chronic GENOTYPE 1A OR 1B  REFERRAL TO GASTROENTEROLOGY  -Chronic problem, unstable.  She is interested in establishing with GI for further evaluation and treatment of her hep C, referral placed.   3. Encounter to establish care     4. Screening for metabolic disorder  HEMOGLOBIN A1C    TSH WITH REFLEX TO FT4   5. Encounter for vitamin deficiency screening  VITAMIN D,25 HYDROXY   6. Encounter for screening mammogram for breast cancer     7. Encounter for screening mammogram for malignant neoplasm of breast  MA-SCREENING MAMMO BILAT W/TOMOSYNTHESIS W/CAD       Any change or worsening of signs or symptoms, patient encouraged to follow-up or report to emergency room for further evaluation. Patient verbalizes understanding and agrees.    Follow-Up: No follow-ups on file.  I will contact the patient with lab results and we will determine follow-up at that time.      PLEASE NOTE: This dictation was created using voice recognition software. I have made every reasonable attempt to correct obvious errors, but I expect that there are errors of grammar and possibly content that I did not discover before finalizing the note.

## 2020-03-25 NOTE — ASSESSMENT & PLAN NOTE
Prior PCP:Kaylie Pandey    Other Providers:  Pain- Lorie  Psych- Blanca Andrews  GYN- cannot remember

## 2020-03-25 NOTE — ASSESSMENT & PLAN NOTE
Present for years.  It forms an ulcer and heals, then repeats.  Sometimes it is painful when she sleeps on it.

## 2020-06-23 ENCOUNTER — HOSPITAL ENCOUNTER (OUTPATIENT)
Dept: RADIOLOGY | Facility: MEDICAL CENTER | Age: 60
End: 2020-06-23
Attending: NURSE PRACTITIONER
Payer: MEDICARE

## 2020-06-23 DIAGNOSIS — Z12.31 ENCOUNTER FOR SCREENING MAMMOGRAM FOR MALIGNANT NEOPLASM OF BREAST: ICD-10-CM

## 2020-06-23 PROCEDURE — 77067 SCR MAMMO BI INCL CAD: CPT

## 2020-06-23 NOTE — RESULT ENCOUNTER NOTE
Please call pt and let them know that I got the results of her mammogram.  There were no signs of breast cancer, we recommend repeating her mammogram again next year.  Thank you.

## 2020-12-07 ENCOUNTER — OCCUPATIONAL MEDICINE (OUTPATIENT)
Dept: URGENT CARE | Facility: PHYSICIAN GROUP | Age: 60
End: 2020-12-07
Payer: MEDICARE

## 2020-12-07 ENCOUNTER — OCCUPATIONAL MEDICINE (OUTPATIENT)
Dept: URGENT CARE | Facility: CLINIC | Age: 60
End: 2020-12-07
Payer: MEDICARE

## 2020-12-07 ENCOUNTER — APPOINTMENT (OUTPATIENT)
Dept: RADIOLOGY | Facility: IMAGING CENTER | Age: 60
End: 2020-12-07
Attending: NURSE PRACTITIONER
Payer: MEDICARE

## 2020-12-07 VITALS
WEIGHT: 167 LBS | SYSTOLIC BLOOD PRESSURE: 108 MMHG | HEIGHT: 64 IN | TEMPERATURE: 98.3 F | RESPIRATION RATE: 14 BRPM | DIASTOLIC BLOOD PRESSURE: 72 MMHG | OXYGEN SATURATION: 94 % | BODY MASS INDEX: 28.51 KG/M2 | HEART RATE: 70 BPM

## 2020-12-07 VITALS
HEART RATE: 66 BPM | WEIGHT: 167 LBS | DIASTOLIC BLOOD PRESSURE: 70 MMHG | TEMPERATURE: 99.1 F | SYSTOLIC BLOOD PRESSURE: 110 MMHG | BODY MASS INDEX: 28.51 KG/M2 | HEIGHT: 64 IN | OXYGEN SATURATION: 96 % | RESPIRATION RATE: 16 BRPM

## 2020-12-07 DIAGNOSIS — Z02.1 PRE-EMPLOYMENT DRUG SCREENING: ICD-10-CM

## 2020-12-07 DIAGNOSIS — M25.551 RIGHT HIP PAIN: ICD-10-CM

## 2020-12-07 LAB
AMP AMPHETAMINE: NORMAL
BREATH ALCOHOL COMMENT: NORMAL
COC COCAINE: NORMAL
INT CON NEG: NORMAL
INT CON POS: NORMAL
MET METHAMPHETAMINES: NORMAL
OPI OPIATES: NORMAL
PCP PHENCYCLIDINE: NORMAL
POC BREATHALIZER: 0 PERCENT (ref 0–0.01)
POC DRUG COMMENT 753798-OCCUPATIONAL HEALTH: NORMAL
THC: NORMAL

## 2020-12-07 PROCEDURE — 82075 ASSAY OF BREATH ETHANOL: CPT | Performed by: FAMILY MEDICINE

## 2020-12-07 PROCEDURE — 80305 DRUG TEST PRSMV DIR OPT OBS: CPT | Performed by: FAMILY MEDICINE

## 2020-12-07 PROCEDURE — 99214 OFFICE O/P EST MOD 30 MIN: CPT | Performed by: NURSE PRACTITIONER

## 2020-12-07 PROCEDURE — 73502 X-RAY EXAM HIP UNI 2-3 VIEWS: CPT | Mod: TC,RT | Performed by: NURSE PRACTITIONER

## 2020-12-07 PROCEDURE — 99203 OFFICE O/P NEW LOW 30 MIN: CPT | Performed by: FAMILY MEDICINE

## 2020-12-07 RX ORDER — BUSPIRONE HYDROCHLORIDE 30 MG/1
TABLET ORAL
COMMUNITY
Start: 2020-10-20

## 2020-12-07 ASSESSMENT — FIBROSIS 4 INDEX
FIB4 SCORE: 2.26
FIB4 SCORE: 2.26

## 2020-12-07 ASSESSMENT — ENCOUNTER SYMPTOMS
TINGLING: 0
SHORTNESS OF BREATH: 0
MYALGIAS: 1
NECK PAIN: 0
CHILLS: 0
VOMITING: 0
BACK PAIN: 1
NAUSEA: 0
FEVER: 0
HIP PAIN: 1

## 2020-12-07 NOTE — PROGRESS NOTES
"Subjective:      Khalida Delaney is a 60 y.o. female who presents with Hip Pain (Work injury.  Right hip pain and unable to walk.)      Subjective Complaints: Due to reported injury: 12/5/2020.  60-year-old  presents with a chief complaint of right hip pain, onset 12/5/2020 while walking at work as a .  The employee denies a specific mechanism of injury and specifically denies blunt traumatic injury to the right hip.  The employee was able to finish the shift and experienced worsening pain the next day.  Employee complains of pain with attempts at ambulating.    FV: 12/7/2020  Patient reports she was seen earlier today at another urgent care site by provider and was told that she should come here for x-ray of her hip as the x-ray tech there was sick.  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  The next day she states she awoke and was unable to walk without significant pain.  She denies any numbness or tingling in the extremities.          Review of Systems   Constitutional: Negative for chills and fever.   Respiratory: Negative for shortness of breath.    Cardiovascular: Negative for chest pain.   Gastrointestinal: Negative for nausea and vomiting.   Musculoskeletal: Positive for back pain, joint pain and myalgias. Negative for neck pain.   Skin: Negative for rash.   Neurological: Negative for tingling.   All other systems reviewed and are negative.      PMH: No pertinent past medical history to this problem  MEDS: Medications were reviewed in EMR  ALLERGIES: Allergies were reviewed in EMR  SOCHX: Works as a   FH: No pertinent family history to this problem       Objective:     /70   Pulse 66   Temp 37.3 °C (99.1 °F) (Temporal)   Resp 16   Ht 1.613 m (5' 3.5\")   Wt 75.8 kg (167 lb)   SpO2 96%   BMI 29.12 kg/m²      Physical Exam  Vitals signs reviewed. "   Constitutional:       General: She is not in acute distress.     Appearance: Normal appearance. She is not ill-appearing.   HENT:      Head: Normocephalic.      Right Ear: External ear normal.      Left Ear: External ear normal.   Neck:      Musculoskeletal: Normal range of motion and neck supple. No neck rigidity or muscular tenderness.   Cardiovascular:      Rate and Rhythm: Normal rate.      Pulses: Normal pulses.   Pulmonary:      Effort: Pulmonary effort is normal.   Abdominal:      Palpations: Abdomen is soft.   Musculoskeletal:      Right hip: She exhibits decreased range of motion and tenderness. She exhibits normal strength, no bony tenderness, no swelling and no deformity.        Legs:    Skin:     General: Skin is warm.   Neurological:      Mental Status: She is alert and oriented to person, place, and time.   Psychiatric:         Mood and Affect: Mood normal.         Behavior: Behavior normal.          Right hip: No erythema, edema or warmth.  Moderate tenderness to palpation on the lateral aspect of her hip.  Neurovascularly intact, DTR's +2 bilateral and symmetric, motor strength intact throughout lower extremity.  Antalgic gait     DX-HIP-COMPLETE - UNILATERAL 2+ RIGHT  Narrative: 12/7/2020 2:02 PM    HISTORY/REASON FOR EXAM:  Atraumatic Pelvic/Hip Pain.  Injury, pain    TECHNIQUE/EXAM DESCRIPTION AND NUMBER OF VIEWS:  2 views of the RIGHT hip.    COMPARISON: Correlation to abdomen pelvis CT 8/5/2011    FINDINGS:  There is no fracture or dislocation.  The visualized osseous structures are in anatomic alignment.  Joint spaces are preserved.  Bone mineralization is age-appropriate..  Impression: No acute osseous abnormality.    Assessment/Plan:        1. Right hip pain  DX-HIP-COMPLETE - UNILATERAL 2+ RIGHT       May take over-the-counter Ibuprofen 600-800 mg every 8 hours as needed for pain    Discussed that patient may be billed for xray and services since there is no clear mechanism of injury  making this work-related. Patient verbalized understanding and still wished to proceed with imaging.    Rest, ice/heat therapy discussed, gentle ROM exercises encouraged, OTC ibuprofen,work restrictions per D39

## 2020-12-07 NOTE — PROGRESS NOTES
Subjective:   Khalida Delaney is a 60 y.o. female who presents for Hip Injury (WC New, Pt states she was walking at work when she felt a sudden pain in her hip, and is now unable to bear weight on RT side. )    Due to reported injury: 12/5/2020.  60-year-old  presents with a chief complaint of right hip pain, onset 12/5/2020 while walking at work as a .  The employee denies a specific mechanism of injury and specifically denies blunt traumatic injury to the right hip.  The employee was able to finish the shift and experienced worsening pain the next day.  Employee complains of pain with attempts at ambulating.   See the C4 and D 39 form    Hip Injury      PMH:  has a past medical history of Arthritis, Back pain, Bipolar 2 disorder, Chronic LBP, Chronic neck pain, Depression, Hep C w/o coma, chronic (HCC), HTN (hypertension) (6/2/2016), Melanosis coli (4/20/2017), Shoulder pain, right, Simple cyst of kidney right (6/1/2012), and Tobacco dependence (11/2/2016).  MEDS:   Current Outpatient Medications:   •  alprazolam (XANAX) 2 MG tablet, Take 2 mg by mouth 3 times a day., Disp: , Rfl:   •  methadone (DOLOPHINE) 10 MG Tab, Take 70 mg by mouth every day., Disp: , Rfl:   •  Cholecalciferol (VITAMIN D3) 2000 UNIT Cap, TAKE 2 CAPSULES BY MOUTH EVERY DAY, Disp: 180 Cap, Rfl: 1  •  lamotrigine (LAMICTAL) 150 MG tablet, 150 mg every day., Disp: , Rfl:   •  busPIRone (BUSPAR) 10 MG Tab tablet, Take 20 mg by mouth 2 Times a Day., Disp: , Rfl:   •  ziprasidone (GEODON) 80 MG Cap, Take 160 mg by mouth every bedtime., Disp: , Rfl: 1  •  Multiple Vitamins-Minerals (MULTIVITAMIN PO), Take 1 Tab by mouth every day at 6 PM., Disp: , Rfl:   •  busPIRone (BUSPAR) 30 MG tablet, Take  by mouth. Take 1 tablet by mouth two times a day as directed, Disp: , Rfl:   •   MG Tab, Take 800 mg by mouth every 6 hours as needed., Disp: , Rfl: 0  ALLERGIES:   Allergies   Allergen Reactions   • Nkda [No  "Known Drug Allergy]      SURGHX:   Past Surgical History:   Procedure Laterality Date   • APPENDECTOMY     • PRIMARY C SECTION       SOCHX:  reports that she has been smoking. She has smoked for the past 35.00 years. She has never used smokeless tobacco. She reports that she does not drink alcohol or use drugs.  FH:   Family History   Problem Relation Age of Onset   • Cancer Sister 44        breast and cervical/ovarian   • Hypertension Mother    • Diabetes Mother    • Cancer Mother         bone/brain cancer   • No Known Problems Father    • Genitourinary () Problems Sister         fibroid tumors   • Stroke Maternal Grandmother    • Stroke Maternal Grandfather    • Heart Disease Maternal Grandfather    • Lung Disease Neg Hx    • Hyperlipidemia Neg Hx      Review of Systems   All other systems reviewed and are negative.       Objective:   /72   Pulse 70   Temp 36.8 °C (98.3 °F) (Temporal)   Resp 14   Ht 1.613 m (5' 3.5\")   Wt 75.8 kg (167 lb)   SpO2 94%   BMI 29.12 kg/m²   Physical Exam  Vitals signs and nursing note reviewed.   Constitutional:       General: She is not in acute distress.     Appearance: She is well-developed.   HENT:      Head: Normocephalic and atraumatic.      Right Ear: External ear normal.      Left Ear: External ear normal.      Nose: Nose normal.      Mouth/Throat:      Mouth: Mucous membranes are moist.   Eyes:      Conjunctiva/sclera: Conjunctivae normal.   Cardiovascular:      Rate and Rhythm: Normal rate.   Pulmonary:      Effort: Pulmonary effort is normal. No respiratory distress.      Breath sounds: Normal breath sounds.   Abdominal:      General: There is no distension.   Musculoskeletal: Normal range of motion.   Skin:     General: Skin is warm and dry.   Neurological:      General: No focal deficit present.      Mental Status: She is alert and oriented to person, place, and time. Mental status is at baseline.      Gait: Gait abnormal (able to transfer from wheelchair to " standing with no assistance).   Psychiatric:         Judgment: Judgment normal.       Right hip: Decreased range of motion throughout with guarding noted, no direct bony point tenderness, diffuse tenderness to palpation, neurovascularly intact, deep tendon reflexes +2 bilateral and symmetric patella, motor strength intact throughout lower extremity yet with guarding noted   Assessment/Plan:   1. Right hip pain    2. Pre-employment drug screening  - POCT Breath Alcohol Test  - POCT 6 Panel Urine Drug Screen    Other orders  - busPIRone (BUSPAR) 30 MG tablet; Take  by mouth. Take 1 tablet by mouth two times a day as directed      See the D 39 and C4 form.  In the context of no direct connection of the patient's hip pain as job incurred and no direct connection to an occupational injury, the patient was advised to follow-up with her primary care provider for further evaluation and management and in the meantime was advised seated work only.        Please note that this dictation was created using voice recognition software. I have worked with consultants from the vendor as well as technical experts from [facility] to optimize the interface. I have made every reasonable attempt to correct obvious errors, but I expect that there are errors of grammar and possibly content that I did not discover before finalizing the note.

## 2020-12-07 NOTE — LETTER
"EMPLOYEE’S CLAIM FOR COMPENSATION/ REPORT OF INITIAL TREATMENT  FORM C-4    EMPLOYEE’S CLAIM - PROVIDE ALL INFORMATION REQUESTED   First Name  Khalida Last Name  Rhett Birthdate                    1960                Sex  female Claim Number   Home Address  [address] Age  60 y.o. Height  1.613 m (5' 3.5\") Weight  75.8 kg (167 lb) [facility]     [facility] Zip  [address] Telephone  [phone] (home)    Mailing Address  [address] [facility] Zip  [address] Primary Language Spoken  English    Insurer  [facility] [employer] Third Party   [employer]   Employee's Occupation (Job Title) When Injury or Occupational Disease Occurred      Employer's Name  [employer]  Telephone  [phone]    Employer Address  [address]  [place]  Zip  [address]    Date of Injury  12/5/2020               Hour of Injury  12:00 PM Date Employer Notified  12/5/2020 Last Day of Work after Injury     or Occupational Disease  12/5/2020 Supervisor to Whom Injury     Reported  [facility]   Address or Location of Accident (if applicable)  [On cocktail floor]   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 (Be specific an answer in detail. 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   If you believe that you have an occupational disease, when did you first have knowledge of the disability and it relationship to your employment?  n/a Witnesses to the Accident  [facility]      Nature of Injury or Occupational Disease  Workers' Compensation  Part(s) of Body Injured or Affected  Hip (R), ,     I certify that the above is true and correct to the best of my knowledge and that I have " provided this information in order to obtain the benefits of [place]).  I hereby authorize any physician, chiropractor, surgeon, practitioner, or other person, any hospital, including [facility] or [facility], any medical service organization, any insurance company, or other institution or organization to release to each other, any medical or other information, including benefits paid or payable, pertinent to this injury or disease, except information relative to diagnosis, treatment and/or counseling for AIDS, psychological conditions, alcohol or controlled substances, for which I must give specific authorization.  A Photostat of this authorization shall be as valid as the original.     Date 12/7/20   Place [facility]   Employee’s Signature   THIS REPORT MUST BE COMPLETED AND MAILED WITHIN 3 WORKING DAYS OF TREATMENT   Place  [facility]  Name of Facility  [facility]   Date  12/7/2020 Diagnosis  (M25.551) Right hip pain  (Z02.1) Pre-employment drug screening Is there evidence the injured employee was under the              influence of alcohol and/or another controlled substance at the time of accident?   Hour  11:22 AM Description of Injury or Disease  Diagnoses of Right hip pain and Pre-employment drug screening were pertinent to this visit. No   Treatment  Evaluation  Have you advised the patient to remain off work five days or     more? No   X-Ray Findings    Comments:Not applicable   If Yes   From Date  To Date      From information given by the employee, together with medical evidence, can you directly connect this injury or occupational disease as job incurred?  No If No Full Duty    No Modified Duty  Yes   Is additional medical care by a physician indicated?  Yes If Modified Duty, Specify any Limitations / Restrictions  Seated work only   Do you know of any  "previous injury or disease contributing to this condition or occupational disease?                            No   Date  12/7/2020 Print Doctor’s Name   Heber Bronw M.D. I certify the employer’s copy of  this form was mailed on:   Address  [address] [address] Insurer’s Use Only     [facility] Zip  [address]    Provider’s Tax ID Number  [id number] Telephone  Dept: [phone]      e-HEBER Caballero M.D.  Signature:     Degree          ORIGINAL-TREATING PHYSICIAN OR CHIROPRACTOR    PAGE 2-INSURER/TPA    PAGE 3-EMPLOYER    PAGE 4-EMPLOYEE        Form C-4 (rev.10/07)          BRIEF DESCRIPTION OF RIGHTS AND BENEFITS  (Pursuant to [place] 616C.050)    Notice of Injury or Occupational Disease (Incident Report Form C-1): If an injury or occupational disease (OD) arises out of and in the course of employment, you must provide written notice to your employer as soon as practicable, but no later than 7 days after the accident or OD. Your employer shall maintain a sufficient supply of the required forms.     Claim for Compensation (Form C-4): If medical treatment is sought, the form C-4 is available at the place of initial treatment. A completed \"Claim for Compensation\" (Form C-4) must be filed within 90 days after an accident or OD. The treating physician or chiropractor must, within 3 working days after treatment, complete and mail to the employer, the employer's insurer and third-party , the Claim for Compensation.     Medical Treatment: If you require medical treatment for your on-the-job injury or OD, you may be required to select a physician or chiropractor from a list provided by your workers’ compensation insurer, if it has contracted with an Organization for Managed Care (MCO) or Preferred Provider Organization (PPO) or providers of health care. If your employer has not entered into a contract with an MCO or PPO, you may select a physician or chiropractor from the Panel of " Physicians and Chiropractors. Any medical costs related to your industrial injury or OD will be paid by your insurer.     Temporary Total Disability (TTD): If your doctor has certified that you are unable to work for a period of at least 5 consecutive days, or 5 cumulative days in a 20-day period, or places restrictions on you that your employer does not accommodate, you may be entitled to TTD compensation.     Temporary Partial Disability (TPD): If the wage you receive upon reemployment is less than the compensation for TTD to which you are entitled, the insurer may be required to pay you TPD compensation to make up the difference. TPD can only be paid for a maximum of 24 months.     Permanent Partial Disability (PPD): When your medical condition is stable and there is an indication of a PPD as a result of your injury or OD, within 30 days, your insurer must arrange for an evaluation by a rating physician or chiropractor to determine the degree of your PPD. The amount of your PPD award depends on the date of injury, the results of the PPD evaluation and your age and wage.     Permanent Total Disability (PTD): If you are medically certified by a treating physician or chiropractor as permanently and totally disabled and have been granted a PTD status by your insurer, you are entitled to receive monthly benefits not to exceed 66 2/3% of your average monthly wage. The amount of your PTD payments is subject to reduction if you previously received a PPD award.     Vocational Rehabilitation Services: You may be eligible for vocational rehabilitation services if you are unable to return to the job due to a permanent physical impairment or permanent restrictions as a result of your injury or occupational disease.     Transportation and Per Chasity Reimbursement: You may be eligible for travel expenses and per chasity associated with medical treatment.     Reopening: You may be able to reopen your claim if your condition worsens  after claim closure.     Appeal Process: If you disagree with a written determination issued by the insurer or the insurer does not respond to your request, you may appeal to the Department of Administration, , by following the instructions contained in your determination letter. You must appeal the determination within 70 days from the date of the determination letter at [address], [address], [place], [place] [address], or [address], [place], [place] [address]. If you disagree with the  decision, you may appeal to the Department of Administration, . You must file your appeal within 30 days from the date of the  decision letter at [address], [address], [place], [place] [address], or [address], [place], [place] [address]. If you disagree with a decision of an , you may file a petition for judicial review with the District Court. You must do so within 30 days of the Appeal Officer’s decision. You may be represented by an  at your own expense or you may contact the [facility] for possible representation.     [place]  for Injured Workers (NAIW): If you disagree with a  decision, you may request that NAIW represent you without charge at an  Hearing. For information regarding denial of benefits, you may contact the NA at: [address], [address], [place] [address], [phone] [phone], or [address] [facility] [address], [place] [address], [phone]     To File a Complaint with the Division: If you wish to file a complaint with the  of the Division of Industrial Relations (DIR), please contact the Workers’ Compensation Section, [address], [place], [place] [address], telephone (456) 963-4119, or [address], [place], [place] [address], telephone [phone].     For assistance  with Workers’ Compensation Issues: You may contact the Office of the Governor Consumer Health Assistance, [address] [place] [address], [address] [facility] [facility] [address], [place], [place] [address], Toll Free 1-514.267.9868, Web site: http://govcha.[facility].nv., E-mail [email or web address].              __________________________________________________________________                              ____12/7/20_______________         Employee Name / Signature                                                                                                                     Date                                                                                                                                                                                       D-2 (rev. 01/20)

## 2020-12-07 NOTE — LETTER
[facility]  [address] [place] [address]. [address] [place] [address]  Phone:  [phone] - Fax:  [phone]   [facility] Progress Report and Disability Certification  Date of Service: 12/7/2020   No Show:  No  Date / Time of Next Visit: 12/14/2020 @8AM    Claim Information   Patient Name: Khalida Delaney  Claim Number:     Employer: [employer]  Date of Injury: 12/5/2020     Insurer / TPA: [facility]  ID / SSN:     Occupation:   Diagnosis: The encounter diagnosis was Right hip pain.    Medical Information   Related to Industrial Injury?   Comments:Unknown    Subjective Complaints:  Subjective Complaints: Due to reported injury: 12/5/2020.  60-year-old  presents with a chief complaint of right hip pain, onset 12/5/2020 while walking at work as a .  The employee denies a specific mechanism of injury and specifically denies blunt traumatic injury to the right hip.  The employee was able to finish the shift and experienced worsening pain the next day.  Employee complains of pain with attempts at ambulating.    FV: 12/7/2020  Patient reports she was seen earlier today at another urgent care site by provider and was told that she should come here for x-ray of her hip as the x-ray tech there was sick.  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  The next day she states she awoke and was unable to walk without significant pain.  She denies any numbness or tingling in the extremities.         Objective Findings:  Right hip: No erythema, edema or warmth.  Moderate tenderness to palpation on the lateral aspect of her hip.  Neurovascularly intact, DTR's +2 bilateral and symmetric, motor strength intact throughout lower extremity.  Antalgic gait   Pre-Existing Condition(s): Unknown.   Assessment:   Condition Same    Status:  Additional Care Required  Permanent Disability:No    Plan:   Comments:NSAIDS PRN    Diagnostics: X-ray  Comments:Negative hip series    Comments:       Disability Information   Status: Released to Restricted Duty    From:  2020  Through: 2020 Restrictions are: Temporary   Physical Restrictions   Sitting:    Standing:    Stooping:    Bending:      Squatting:    Walkin hrs/day Climbing:    Pushing:      Pulling:    Other:    Reaching Above Shoulder (L):   Reaching Above Shoulder (R):       Reaching Below Shoulder (L):    Reaching Below Shoulder (R):      Not to exceed Weight Limits   Carrying(hrs):   Weight Limit(lb): < or = to 10 pounds Lifting(hrs):   Weight  Limit(lb): < or = to 10 pounds   Comments: Recommend seated work only x 7 days.   Recommend f/u with PCP.     Repetitive Actions   Hands: i.e. Fine Manipulations from Grasping:     Feet: i.e. Operating Foot Controls:     Driving / Operate Machinery:     Provider Name:   MARTHA Alvarado Physician Signature:  Physician Name:     Clinic Name / Location: [address]  [place] [address] Clinic Phone Number: Dept: [phone]   Appointment Time: 1:30 Pm Visit Start Time: 1:42 PM   Check-In Time:  1:37 Pm Visit Discharge Time:  2:43PM   Original-Treating Physician or Chiropractor    Page 2-Insurer/TPA    Page 3-Employer    Page 4-Employee

## 2020-12-07 NOTE — PATIENT INSTRUCTIONS
Hip Pain    The hip is the joint between the upper legs and the lower pelvis. The bones, cartilage, tendons, and muscles of your hip joint support your body and allow you to move around.  Hip pain can range from a minor ache to severe pain in one or both of your hips. The pain may be felt on the inside of the hip joint near the groin, or the outside near the buttocks and upper thigh. You may also have swelling or stiffness.  Follow these instructions at home:  Managing pain, stiffness, and swelling  · If directed, apply ice to the injured area.  ? Put ice in a plastic bag.  ? Place a towel between your skin and the bag.  ? Leave the ice on for 20 minutes, 2-3 times a day  · Sleep with a pillow between your legs on your most comfortable side.  · Avoid any activities that cause pain.  General instructions  · Take over-the-counter and prescription medicines only as told by your health care provider.  · Do any exercises as told by your health care provider.  · Record the following:  ? How often you have hip pain.  ? The location of your pain.  ? What the pain feels like.  ? What makes the pain worse.  · Keep all follow-up visits as told by your health care provider. This is important.  Contact a health care provider if:  · You cannot put weight on your leg.  · Your pain or swelling continues or gets worse after one week.  · It gets harder to walk.  · You have a fever.  Get help right away if:  · You fall.  · You have a sudden increase in pain and swelling in your hip.  · Your hip is red or swollen or very tender to touch.  Summary  · Hip pain can range from a minor ache to severe pain in one or both of your hips.  · The pain may be felt on the inside of the hip joint near the groin, or the outside near the buttocks and upper thigh.  · Avoid any activities that cause pain.  · Record how often you have hip pain, the location of the pain, what makes it worse and what it feels like.  This information is not intended to  replace advice given to you by your health care provider. Make sure you discuss any questions you have with your health care provider.  Document Released: 06/07/2011 Document Revised: 11/30/2018 Document Reviewed: 11/20/2017  Elsevier Patient Education © 2020 Elsevier Inc.

## 2020-12-07 NOTE — LETTER
[facility]  [address] [place] [address]  Phone:  [phone] - Fax:  [phone]   [facility] Progress Report and Disability Certification  Date of Service: 12/7/2020   No Show:  No  Date / Time of Next Visit: 12/14/2020   Claim Information   Patient Name: Khalida Delaney  Claim Number:     Employer: [employer]  Date of Injury: 12/5/2020     Insurer / TPA: [facility] Insurance  ID / SSN:     Occupation:   Diagnosis: Diagnoses of Right hip pain and Pre-employment drug screening were pertinent to this visit.    Medical Information   Related to Industrial Injury? No    Subjective Complaints:  Due to reported injury: 12/5/2020.  60-year-old  presents with a chief complaint of right hip pain, onset 12/5/2020 while walking at work as a .  The employee denies a specific mechanism of injury and specifically denies blunt traumatic injury to the right hip.  The employee was able to finish the shift and experienced worsening pain the next day.  Employee complains of pain with attempts at ambulating.   Objective Findings: Right hip: Decreased range of motion throughout with guarding noted, no direct bony point tenderness, diffuse tenderness to palpation, neurovascularly intact, deep tendon reflexes +2 bilateral and symmetric patella, motor strength intact throughout lower extremity yet with guarding noted   Pre-Existing Condition(s):     Assessment:   Initial Visit    Status: Discharged / Care Transfer  Comments:Advised follow-up with primary care provider  Permanent Disability:No    Plan:   Comments:Advised follow-up with primary care provider    Diagnostics:   Comments:Not applicable    Comments:       Disability Information   Status: Released to Restricted Duty    From:  12/7/2020  Through: 12/14/2020 Restrictions are: Temporary   Physical Restrictions   Sitting:    Standing:    Stooping:    Bending:         Squatting:    Walking:    Climbing:    Pushing:      Pulling:    Other:    Reaching Above Shoulder (L):   Reaching Above Shoulder (R):       Reaching Below Shoulder (L):    Reaching Below Shoulder (R):      Not to exceed Weight Limits   Carrying(hrs):   Weight Limit(lb):   Lifting(hrs):   Weight  Limit(lb):     Comments: Recommend seated work only and follow-up with primary care provider    Repetitive Actions   Hands: i.e. Fine Manipulations from Grasping:     Feet: i.e. Operating Foot Controls:     Driving / Operate Machinery:     Provider Name:   Heber Brown M.D. Physician Signature:  Physician Name:     Clinic Name / Location: [address], [address] [place] [address] Clinic Phone Number: Dept: [phone]   Appointment Time: 10:45 Am Visit Start Time: 11:22 AM   Check-In Time:  10:52 Am Visit Discharge Time:  11:52 AM   Original-Treating Physician or Chiropractor    Page 2-Insurer/TPA    Page 3-Employer    Page 4-Employee

## 2020-12-14 ENCOUNTER — HOSPITAL ENCOUNTER (OUTPATIENT)
Facility: MEDICAL CENTER | Age: 60
End: 2020-12-14
Attending: PHYSICIAN ASSISTANT
Payer: MEDICARE

## 2020-12-14 ENCOUNTER — OFFICE VISIT (OUTPATIENT)
Dept: URGENT CARE | Facility: PHYSICIAN GROUP | Age: 60
End: 2020-12-14
Payer: MEDICARE

## 2020-12-14 ENCOUNTER — OCCUPATIONAL MEDICINE (OUTPATIENT)
Dept: URGENT CARE | Facility: PHYSICIAN GROUP | Age: 60
End: 2020-12-14
Payer: MEDICARE

## 2020-12-14 VITALS
WEIGHT: 167 LBS | SYSTOLIC BLOOD PRESSURE: 104 MMHG | BODY MASS INDEX: 28.51 KG/M2 | HEART RATE: 76 BPM | TEMPERATURE: 97.9 F | HEIGHT: 64 IN | BODY MASS INDEX: 28.51 KG/M2 | RESPIRATION RATE: 16 BRPM | RESPIRATION RATE: 16 BRPM | DIASTOLIC BLOOD PRESSURE: 60 MMHG | WEIGHT: 167 LBS | HEIGHT: 64 IN | DIASTOLIC BLOOD PRESSURE: 60 MMHG | OXYGEN SATURATION: 94 % | TEMPERATURE: 97.9 F | OXYGEN SATURATION: 94 % | SYSTOLIC BLOOD PRESSURE: 104 MMHG | HEART RATE: 76 BPM

## 2020-12-14 DIAGNOSIS — R05.9 COUGH: ICD-10-CM

## 2020-12-14 DIAGNOSIS — J00 ACUTE RHINITIS: ICD-10-CM

## 2020-12-14 DIAGNOSIS — M54.31 RIGHT SIDED SCIATICA: ICD-10-CM

## 2020-12-14 DIAGNOSIS — M25.551 RIGHT HIP PAIN: ICD-10-CM

## 2020-12-14 LAB
COVID ORDER STATUS COVID19: NORMAL
SARS-COV-2 RNA RESP QL NAA+PROBE: NOTDETECTED
SPECIMEN SOURCE: NORMAL

## 2020-12-14 PROCEDURE — U0003 INFECTIOUS AGENT DETECTION BY NUCLEIC ACID (DNA OR RNA); SEVERE ACUTE RESPIRATORY SYNDROME CORONAVIRUS 2 (SARS-COV-2) (CORONAVIRUS DISEASE [COVID-19]), AMPLIFIED PROBE TECHNIQUE, MAKING USE OF HIGH THROUGHPUT TECHNOLOGIES AS DESCRIBED BY CMS-2020-01-R: HCPCS

## 2020-12-14 PROCEDURE — 99213 OFFICE O/P EST LOW 20 MIN: CPT | Performed by: PHYSICIAN ASSISTANT

## 2020-12-14 ASSESSMENT — FIBROSIS 4 INDEX
FIB4 SCORE: 2.26
FIB4 SCORE: 2.26

## 2020-12-14 ASSESSMENT — ENCOUNTER SYMPTOMS
MYALGIAS: 0
CONSTIPATION: 0
NAUSEA: 0
HEADACHES: 0
VOMITING: 0
ABDOMINAL PAIN: 0
EYE PAIN: 0
COUGH: 1
DIARRHEA: 0
CHILLS: 0
FEVER: 0
SORE THROAT: 0
SHORTNESS OF BREATH: 0

## 2020-12-14 NOTE — LETTER
"   [facility] [address] - [place] [address]  Phone:  [phone] - Fax:  [phone]   [facility] Progress Report and Disability Certification  Date of Service: 12/14/2020   No Show:  No  Date / Time of Next Visit: 12/21/2020 @ 8:00AM   Claim Information   Patient Name: Khalida Delaney  Claim Number:     Employer: [employer]  Date of Injury: 12/5/2020     Insurer / TPA: [facility]  ID / SSN:     Occupation:   Diagnosis: There were no encounter diagnoses.    Medical Information   Related to Industrial Injury?   Comments:Unclear, will refer to occupational medicine who can make this distinction    Subjective Complaints:  Quoted from first two visits:  \"Due to reported injury: 12/5/2020.  60-year-old  presents with a chief complaint of right hip pain, onset 12/5/2020 while walking at work as a .  The employee denies a specific mechanism of injury and specifically denies blunt traumatic injury to the right hip.  The employee was able to finish the shift and experienced worsening pain the next day.  Employee complains of pain with attempts at ambulating.\"     \"FV: 12/7/2020: Patient reports she was seen earlier today at another urgent care site by provider and was told that she should come here for x-ray of her hip as the x-ray tech there was sick.  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  The next day she states she awoke and was unable to walk without significant pain.  She denies any numbness or tingling in the extremities.\"    Today, patient returns to urgent care for her third visit: Today she reports that during her overnight shift starting on 12/5 extending into 12/6 she pushed a chair into its proper place using the lateral aspect of her right hip and around 15 minutes later started to " "feel some pain along the aspect where she pushed the chair in.  Progressively throughout the remaining shift she noticed increasing pain.  The next day she \"could not walk it was so painful\".  She describes pain over the lateral aspect of the hip that radiates down into the foot.  She has history of back problems but no history of sciatica.  Since her last visit she was even work restrictions to remain seated however her work was not able to accommodate this so she has been at home.  She has been diligently icing and using heat as well as ibuprofen and reports a very gradual improvement but still feels a lot of difficulty walking.  She has a old cane that she has been using to ambulate so that she can put less weight on her right leg.  She has no second job.  She has a history of back surgery 8 years ago that may be contributory.   Objective Findings: Alert nontoxic female no acute distress.  She is ambulatory with an antalgic gait and uses a cane on her right side to reduce the amount of weight on her leg.  She has TTP diffusely over the greater trochanter on the lateral aspect of the hip.  No bony tenderness elsewhere.  No tenderness along the bilateral SI and lower lumbar paraspinal muscles.  5 out of 5 strength of the extremity.  Neurovascularly intact.  2+ patellar reflexes symmetrical bilaterally   Pre-Existing Condition(s): Pre-existing back issues with previous surgery.   Assessment:   Condition Improved    Status: Discharged / Care Transfer  Permanent Disability:No    Plan: Transfer Care    Diagnostics:      Comments:  X-rays performed at last visit were negative for fracture or any other bony abnormality    Disability Information   Status: Released to Restricted Duty    From:  12/14/2020  Through: 12/21/2020 Restrictions are: Temporary   Physical Restrictions   Sitting:    Standing:    Stooping:    Bending:      Squatting:    Walking:    Climbing:    Pushing:      Pulling:    Other:    Reaching Above " Shoulder (L):   Reaching Above Shoulder (R):       Reaching Below Shoulder (L):    Reaching Below Shoulder (R):      Not to exceed Weight Limits   Carrying(hrs):   Weight Limit(lb):   Lifting(hrs):   Weight  Limit(lb):     Comments: Recommend seated work only x 7 days.  Continue OTC meds and alternating ICE/HEAT.  Due to complexity and very gradual improvement I will refer to occupational medicine who can provide further direction.    Repetitive Actions   Hands: i.e. Fine Manipulations from Grasping:     Feet: i.e. Operating Foot Controls:     Driving / Operate Machinery:     Provider Name:   Fei Knutson P.A.-C. Physician Signature:  Physician Name:     Clinic Name / Location: [address]  [place] [address] Clinic Phone Number: Dept: [phone]   Appointment Time: 8:00 Am Visit Start Time: 8:08 AM   Check-In Time:  8:00 Am Visit Discharge Time:  9:05 AM   Original-Treating Physician or Chiropractor    Page 2-Insurer/TPA    Page 3-Employer    Page 4-Employee

## 2020-12-14 NOTE — PROGRESS NOTES
"Subjective:   Khalida Delaney is a 60 y.o. female who presents for Runny Nose (cough )      This is a 60-year-old female who presents with 1 week of a runny nose and a cough.  This does not bother her at night but she notices it is worse throughout the daytime hours.  She does have some history of allergies and she has been taking Mucinex which seems to help only slightly.  She denies any wet hacking cough, shortness of breath, chest pain.  She has not noted any fevers or chills or body aches.  She has no specific sick contacts and no recent travel and no recent antibiotics.  She is concerned about COVID-19.      Review of Systems   Constitutional: Negative for chills and fever.   HENT: Positive for congestion. Negative for ear pain and sore throat.    Eyes: Negative for pain.   Respiratory: Positive for cough. Negative for shortness of breath.    Cardiovascular: Negative for chest pain.   Gastrointestinal: Negative for abdominal pain, constipation, diarrhea, nausea and vomiting.   Genitourinary: Negative for dysuria.   Musculoskeletal: Negative for myalgias.   Skin: Negative for rash.   Neurological: Negative for headaches.       Medications, Allergies, and current problem list reviewed today in Epic.     Objective:     /60 (BP Location: Right arm, Patient Position: Sitting, BP Cuff Size: Large adult)   Pulse 76   Temp 36.6 °C (97.9 °F) (Temporal)   Resp 16   Ht 1.613 m (5' 3.5\")   Wt 75.8 kg (167 lb)   SpO2 94%     Physical Exam  Vitals signs reviewed.   Constitutional:       Appearance: Normal appearance.   HENT:      Head: Normocephalic and atraumatic.      Right Ear: External ear normal.      Left Ear: External ear normal.      Nose: Congestion and rhinorrhea present.      Mouth/Throat:      Mouth: Mucous membranes are moist.   Eyes:      Conjunctiva/sclera: Conjunctivae normal.   Cardiovascular:      Rate and Rhythm: Normal rate.   Pulmonary:      Effort: Pulmonary effort is normal.      Breath " sounds: Normal breath sounds.   Skin:     General: Skin is warm and dry.      Capillary Refill: Capillary refill takes less than 2 seconds.   Neurological:      Mental Status: She is alert and oriented to person, place, and time.         Assessment/Plan:     Diagnosis and associated orders:     1. Acute rhinitis  COVID/SARS COV-2 PCR   2. Cough        Comments/MDM:     Patient's vital signs are reassuring and they appear hemodynamically stable and do not require higher level care at this time  I discussed self isolation and provided printed instructions (if applicable)  I discussed ER precautions and provided printed instructions (if applicable)  I educated the patient on possibility of a false-negative test and indications for repeat testing  I instructed the patient to try to follow up with their PCP (if applicable) for follow up care  I provided the patient the printed AVS which contains information about signing up for MyChart   I will contact the patient via [facility] with Covid results.  If requested, I provided the patient with a work note to provide to their employer or school regarding returning to work and discontinuation of self isolation.  All questions were answered and patient demonstrated verbal understanding of above.  I followed all reasonable PPE precautions during this encounter including but not limited to use of an N95 mask, gloves, and gown if indicated.             Differential diagnosis, natural history, supportive care, and indications for immediate follow-up discussed.    Advised the patient to follow-up with the primary care physician for recheck, reevaluation, and consideration of further management.    Please note that this dictation was created using voice recognition software. I have made a reasonable attempt to correct obvious errors, but I expect that there are errors of grammar and possibly content that I did not discover before finalizing the note.    This note was electronically  signed by Fei Knutson PA-C

## 2020-12-14 NOTE — PROGRESS NOTES
"Subjective:     Khalida Delaney is a 60 y.o. female who presents for Follow-Up (workers comp )      Quoted from first two visits:  \"Due to reported injury: 12/5/2020.  60-year-old  presents with a chief complaint of right hip pain, onset 12/5/2020 while walking at work as a .  The employee denies a specific mechanism of injury and specifically denies blunt traumatic injury to the right hip.  The employee was able to finish the shift and experienced worsening pain the next day.  Employee complains of pain with attempts at ambulating.\"     \"FV: 12/7/2020: Patient reports she was seen earlier today at another urgent care site by provider and was told that she should come here for x-ray of her hip as the x-ray tech there was sick.  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  The next day she states she awoke and was unable to walk without significant pain.  She denies any numbness or tingling in the extremities.\"    Today, patient returns to urgent care for her third visit: Today she reports that during her overnight shift starting on 12/5 extending into 12/6 she pushed a chair into its proper place using the lateral aspect of her right hip and around 15 minutes later started to feel some pain along the aspect where she pushed the chair in.  Progressively throughout the remaining shift she noticed increasing pain.  The next day she \"could not walk it was so painful\".  She describes pain over the lateral aspect of the hip that radiates down into the foot.  She has history of back problems but no history of sciatica.  Since her last visit she was even work restrictions to remain seated however her work was not able to accommodate this so she has been at home.  She has been diligently icing and using heat as well as ibuprofen and reports a very gradual improvement but still feels a lot of " "difficulty walking.  She has a old cane that she has been using to ambulate so that she can put less weight on her right leg.  She has no second job.  She has a history of back surgery 8 years ago that may be contributory.    PMH:   No pertinent past medical history to this problem  MEDS:  Medications were reviewed in EMR  ALLERGIES:  Allergies were reviewed in EMR  SOCHX:  Works as a   FH:   No pertinent family history to this problem       Objective:     /60 (BP Location: Right arm, Patient Position: Sitting, BP Cuff Size: Large adult)   Pulse 76   Temp 36.6 °C (97.9 °F) (Temporal)   Resp 16   Ht 1.613 m (5' 3.5\")   Wt 75.8 kg (167 lb)   SpO2 94%   BMI 29.12 kg/m²     Alert nontoxic female no acute distress.  She is ambulatory with an antalgic gait and uses a cane on her right side to reduce the amount of weight on her leg.  She has TTP diffusely over the greater trochanter on the lateral aspect of the hip.  No bony tenderness elsewhere.  No tenderness along the bilateral SI and lower lumbar paraspinal muscles.  5 out of 5 strength of the extremity.  Neurovascularly intact.  2+ patellar reflexes symmetrical bilaterally      RADIOLOGY RESULTS   Dx-hip-complete - Unilateral 2+ Right    Result Date: 12/7/2020 12/7/2020 2:02 PM HISTORY/REASON FOR EXAM:  Atraumatic Pelvic/Hip Pain. Injury, pain TECHNIQUE/EXAM DESCRIPTION AND NUMBER OF VIEWS:  2 views of the RIGHT hip. COMPARISON: Correlation to abdomen pelvis CT 8/5/2011 FINDINGS: There is no fracture or dislocation. The visualized osseous structures are in anatomic alignment. Joint spaces are preserved. Bone mineralization is age-appropriate..     No acute osseous abnormality.           Assessment/Plan:       There are no diagnoses linked to this encounter.  • Released to Restricted Duty FROM 12/14/2020 TO 12/21/2020  • Recommend seated work only x 7 days.  Continue OTC meds and alternating ICE/HEAT.  Due to complexity and very gradual " improvement I will refer to occupational medicine who can provide further direction.  • X-rays performed at last visit were negative for fracture or any other bony abnormality      Differential diagnosis, natural history, supportive care, and indications for immediate follow-up discussed.

## 2020-12-14 NOTE — PATIENT INSTRUCTIONS
••••••••••••••••••••••••••••••••        COVID-19 Test Results & Instructions (11-9-20)    After Your COVID-19 Test Stay Home      [address] [address]      P [phone]    Please stay home until you have received your test results, even if you do not have any symptoms.    What do I do if my test is …? Positive  If your test result was positive (detected), this means the novel coronavirus (SARS-CoV-2) that causes COVID-19 was present in your test sample.    If your symptoms are generally mild and stable, please isolate yourself at home. If it becomes difficult to breathe, contact your primary care provider (by phone) as soon as possible.    Next steps:    • Stay home except to get medical care.  • Follow the instructions for home isolation below.  • Call ahead before visiting your primary care provider or a hospital. Ask for an online virtual visit if possible.    When can my home isolation end?    You should isolate yourself:    • For a minimum of 10 days and  • At least 24 hours have passed since your last fever without the use of fever-reducing medications  and  • All other symptoms have improved.    Negative    If you tested negative (not detected), it is highly unlikely that you have COVID-19.    Several respiratory viruses can cause symptoms like yours, including the common cold or the flu.    1          Next steps:      [address] [address]      P [phone]    • Stay home while you are feeling sick.  • Monitor your symptoms and contact your primary care provider if they get worse.  • If you have questions, contact your primary care provider.    When can my home isolation end?    If you were tested because you had close contact (defined below) with someone with COVID-19, you should stay home for 14 days after your close contact with the person.    What counts as close contact?    • You were within 6 feet of someone who has COVID-19 for a total of 15 minutes  or more;  • You provided care at home to someone who is sick with COVID-19;  • You had direct physical contact with the person (hugged or kissed them);  • You shared eating or drinking utensils;  • They sneezed, coughed, or somehow got respiratory droplets on you.    If you were tested because you were exposed to a household contact with COVID-19, you should still quarantine yourself for a period of 14 days. The 14-day quarantine period begins once your affected household contact is isolated. If you are unable to quarantine yourself from your affected household contact, the 14-day quarantine period begins when the patient meets criteria to break isolation.    If you were not exposed to a household contact with COVID-19, you may still be contagious while experiencing symptoms, so you should not return to work or regular activities until 24 hours after symptoms fully improve. For example, if you feel back to normal on Tuesday, you should remain isolated until Wednesday.    Inconclusive    If your test result was inconclusive, this means the novel coronavirus (SARS-CoV-2) that causes COVID- 19 may be present in your test sample.    An inconclusive test result is treated the same as a positive test result.    If your symptoms are generally mild and stable, please isolate yourself at home. If it becomes difficult to breathe, contact your primary care provider.    2          Next steps:      [address] [address]      P [phone]    • Stay home except to get medical care.  • Follow the instructions for home isolation below.  • Call ahead before visiting your primary care provider or a hospital.    When can my home isolation end?    You should isolate yourself:    • For a minimum of 10 days and  • At least 24 hours have passed since your last fever without the use of fever-reducing medications and  • All other symptoms have improved.    Instructions for Self-Isolation at Home  We recommend you  "stay in your home and minimize contact with others to avoid spreading this infection.    Stay home except to get medical care.    Do not go to work, school or public areas. Avoid using public transportation, ridesharing or taxis.    Separate yourself from other people in your home as much as possible.    Stay in a specific room and away from other people in your home as much as possible. Use a separate bathroom if possible.    Clean your hands often.    Wash your hands often with soap and water for at least 20 seconds. If soap and water are not available, clean your hands with an alcohol-based hand  that contains at least 60% alcohol, covering all surfaces of your hands and rubbing them together until they feel dry. Avoid touching your eyes, nose and mouth with unwashed hands.    Do not share household items with other people in your home.    This includes sharing dishes, drinking glasses, cups, eating utensils, towels or bedding. After using these items, they should be washed thoroughly with soap and water.    3          Clean all \"high-touch\" surfaces regularly.      [address] [address]      P [phone]    This includes counters, tabletops, doorknobs, bathroom fixtures, toilets, phones, keyboards, tablets and bedside tables. Also, clean any surfaces that may have blood, stool or body fluids on them. Use a household cleaning spray or wipe, according to the label instructions.    Cover your coughs and sneezes with a tissue, mask or the inside of your elbow.    Throw used tissues in a lined trash can; immediately wash your hands with soap and water for at least 20 seconds or clean your hands with an alcohol-based hand  that contains at least 60% alcohol.  Soap and water should be used if hands are visibly dirty.    When seeking care at a healthcare facility:    • Seek prompt medical attention if your illness is worsening (e.g., difficulty breathing).  • When possible, " call the healthcare provider before arriving.  • Put on a face mask before you enter the facility.  • If possible, put on a face mask before the ambulance or paramedics arrive.  • These steps will help the healthcare provider's office prevent other people from getting infected or exposed.    Detailed information about these steps can be found on the Centers for Disease Control and Prevention's website.    4

## 2020-12-15 NOTE — RESULT ENCOUNTER NOTE
I spoke with the patient to inform them of their NEGATIVE Covid-19 PCR testing results.  I referenced their AVS paperwork and our office visit reiterating supportive care, ER precautions, and possible continuation of isolation.  I referred them to the CDC guidelines or those of their employer about returning to work/school.

## 2020-12-21 ENCOUNTER — OCCUPATIONAL MEDICINE (OUTPATIENT)
Dept: OCCUPATIONAL MEDICINE | Facility: CLINIC | Age: 60
End: 2020-12-21
Payer: MEDICARE

## 2020-12-21 VITALS
WEIGHT: 177.2 LBS | SYSTOLIC BLOOD PRESSURE: 120 MMHG | RESPIRATION RATE: 12 BRPM | BODY MASS INDEX: 31.4 KG/M2 | HEIGHT: 63 IN | HEART RATE: 88 BPM | TEMPERATURE: 96.8 F | DIASTOLIC BLOOD PRESSURE: 80 MMHG | OXYGEN SATURATION: 96 %

## 2020-12-21 DIAGNOSIS — S76.011D HIP STRAIN, RIGHT, SUBSEQUENT ENCOUNTER: ICD-10-CM

## 2020-12-21 PROCEDURE — 99202 OFFICE O/P NEW SF 15 MIN: CPT | Performed by: PREVENTIVE MEDICINE

## 2020-12-21 ASSESSMENT — FIBROSIS 4 INDEX: FIB4 SCORE: 2.26

## 2020-12-21 NOTE — PROGRESS NOTES
"Subjective:     Khalida Delaney is a 60 y.o. female who presents for Follow-Up      DOI 12/5/2020.  60-year-old  presents with a chief complaint  right hip pain while walking at work as a . No specific incident or trauma. Seen in [facility], advised NSAIDs and work restrictions. XR's right hip were negative for acute findings.  Patient overall feels symptoms have significantly improved.  Has no pain at this point.  Feels ready for release and full duty.    ROS: All systems were reviewed on intake form, form was reviewed and signed. See scanned documents in media. Pertinent positives and negatives included in HPI.    PMH: No pertinent past medical history to this problem  MEDS: Medications were reviewed in Epic  ALLERGIES:   Allergies   Allergen Reactions   • Nkda [No Known Drug Allergy]      SOCHX: Works as  at [employer]  FH: No pertinent family history to this problem       Objective:     /80   Pulse 88   Temp 36 °C (96.8 °F) (Temporal)   Resp 12   Ht 1.6 m (5' 3\")   Wt 80.4 kg (177 lb 3.2 oz)   SpO2 96%   BMI 31.39 kg/m²     Constitutional: Patient is in no acute distress. Appears well-developed and well-nourished.   HENT: Normocephalic and atraumatic. EOM are normal. No scleral icterus.   Cardiovascular: Normal rate.    Pulmonary/Chest: Effort normal. No respiratory distress.   Neurological: Patient is alert and oriented to person, place, and time.   Skin: Skin is warm and dry.   Psychiatric: Normal mood and affect. Behavior is normal.     Right Hip: No gross deform.  No tenderness.  Full range of motion.  Normal gait.    Assessment/Plan:       1. Hip strain, right, subsequent encounter    • Released from care  • Full duty  • Follow-up as needed    Differential diagnosis, natural history, supportive care, and indications for immediate follow-up discussed.  "

## 2020-12-21 NOTE — LETTER
[address],   [address] [place] [address]  Phone:  [phone] - Fax:  [phone]   Occupational Health [facility] Progress Report and Disability Certification  Date of Service: 12/21/2020   No Show:  No  Date / Time of Next Visit:  Release from care   Claim Information   Patient Name: Khalida Delaney  Claim Number:     Employer: [employer]  Date of Injury: 12/5/2020     Insurer / TPA: [facility] [employer]  ID / SSN:     Occupation:   Diagnosis: The encounter diagnosis was Hip strain, right, subsequent encounter.    Medical Information   Related to Industrial Injury? No    Subjective Complaints:  DOI 12/5/2020.  60-year-old  presents with a chief complaint  right hip pain while walking at work as a . No specific incident or trauma. Seen in x3, advised NSAIDs and work restrictions. XR's right hip were negative for acute findings.  Patient overall feels symptoms have significantly improved.  Has no pain at this point.  Feels ready for release and full duty.   Objective Findings: Right Hip: No gross deform.  No tenderness.  Full range of motion.  Normal gait.   Pre-Existing Condition(s):     Assessment:   Condition Improved    Status: Discharged /  MMI  Permanent Disability:No    Plan:      Diagnostics:      Comments:  Released from care  Full duty  Follow-up as needed    Disability Information   Status: Released to Full Duty    From:  12/21/2020  Through:   Restrictions are:     Physical Restrictions   Sitting:    Standing:    Stooping:    Bending:      Squatting:    Walking:    Climbing:    Pushing:      Pulling:    Other:    Reaching Above Shoulder (L):   Reaching Above Shoulder (R):       Reaching Below Shoulder (L):    Reaching Below Shoulder (R):      Not to exceed Weight Limits   Carrying(hrs):   Weight Limit(lb):   Lifting(hrs):   Weight  Limit(lb):     Comments:      Repetitive Actions   Hands: i.e. Fine  Manipulations from Grasping:     Feet: i.e. Operating Foot Controls:     Driving / Operate Machinery:     Provider Name:   Facundo Hidalgo D.O. Physician Signature:  Physician Name:     Clinic Name / Location: [address],   [address]  [place] [address] Clinic Phone Number: Dept: [phone]   Appointment Time: 8:00 Am Visit Start Time: 8:33 AM   Check-In Time:  8:05 Am Visit Discharge Time: 8:41 AM    Original-Treating Physician or Chiropractor    Page 2-Insurer/TPA    Page 3-Employer    Page 4-Employee

## 2020-12-27 ENCOUNTER — HOSPITAL ENCOUNTER (EMERGENCY)
Facility: MEDICAL CENTER | Age: 60
End: 2020-12-27
Attending: EMERGENCY MEDICINE
Payer: OTHER MISCELLANEOUS

## 2020-12-27 VITALS
DIASTOLIC BLOOD PRESSURE: 68 MMHG | RESPIRATION RATE: 16 BRPM | OXYGEN SATURATION: 95 % | BODY MASS INDEX: 30.74 KG/M2 | SYSTOLIC BLOOD PRESSURE: 128 MMHG | HEART RATE: 70 BPM | TEMPERATURE: 97.2 F | HEIGHT: 63 IN | WEIGHT: 173.5 LBS

## 2020-12-27 DIAGNOSIS — R10.31 RIGHT INGUINAL PAIN: ICD-10-CM

## 2020-12-27 PROCEDURE — 99282 EMERGENCY DEPT VISIT SF MDM: CPT

## 2020-12-27 ASSESSMENT — FIBROSIS 4 INDEX: FIB4 SCORE: 2.26

## 2020-12-27 NOTE — ED NOTES
Discharge instructions given to pt including follow up with Occupational Health or returning if no improvement of symptoms or to return if worse. Questions answered by RN. Denies any new complaints. Discharged w/stable vitals and wheeled to the lobby by this RN.

## 2020-12-27 NOTE — ED TRIAGE NOTES
".  Chief Complaint   Patient presents with   • Groin Pain     x 3 weeks, right side, after pushing in chair at work     ./71   Pulse 73   Temp 36.1 °C (96.9 °F) (Temporal)   Resp 16   Ht 1.6 m (5' 3\")   Wt 78.7 kg (173 lb 8 oz)   SpO2 94%   BMI 30.73 kg/m²     Patient to triage for above, seen 12/21 for same, educated on triage process, placed in lobby, told to inform staff of any changes in condition.    "

## 2020-12-27 NOTE — ED NOTES
Pt informed that registration is still working on her workman's comp paperwork. Thanked for the patience.

## 2020-12-27 NOTE — ED PROVIDER NOTES
"ED Provider Note    CHIEF COMPLAINT  Chief Complaint   Patient presents with   • Groin Pain     x 3 weeks, right side, after pushing in chair at work       HPI  Khalida Delaney is a 60 y.o. female who presents with right groin pain.  The patient states has had this for the last 3 weeks.  She states it started while at work when she was trying to push in a chair.  She states the pain is located in the right groin with no radicular component.  She states is worse with ambulation and certain movements.  She does not have any abdominal pain.  She has no difficulties with urination.    REVIEW OF SYSTEMS  No recent fevers, no known sick contacts    PHYSICAL EXAM  VITAL SIGNS: /71   Pulse 73   Temp 36.1 °C (96.9 °F) (Temporal)   Resp 16   Ht 1.6 m (5' 3\")   Wt 78.7 kg (173 lb 8 oz)   SpO2 94%   BMI 30.73 kg/m²   In general the patient appears uncomfortable but nontoxic    GI abdomen is soft    Extremities patient does have some right groin tenderness.  Did not appreciate a femoral hernia.  There is no surrounding erythema nor induration.  Otherwise normal right leg exam    Skin no erythema nor induration    Neurovascular lamination is intact to the right lower extremity    COURSE & MEDICAL DECISION MAKING  Pertinent Labs & Imaging studies reviewed. (See chart for details)  This a 60-year-old female who presents with groin pain after an injury.  I suspect this is from a soft tissue injury.  Clinically do not appreciate any evidence of a hernia.  I also do not appreciate any evidence of an infection.  Her abdomen is benign.  We will treat the patient with anti-inflammatories and rest.  [facility]  In the interim we will place the patient on light duty with no lifting nor pushing greater than 5 pounds and no prolonged standing or walking.    FINAL IMPRESSION  1.  Right groin strain       Disposition  The patient will be discharged in stable " condition      Electronically signed by: José Miguel Ren M.D., 12/27/2020 10:10 AM

## 2020-12-27 NOTE — LETTER
[facility], EMERGENCY DEPT   [address], [place] [address]  Phone: Dept: [phone] - Fax:        [facility] Progress Report and Disability Certification  Date of Service: 12/27/2020   No Show:  No  Date / Time of Next Visit: 12/30/2020   Claim Information   Patient Name: Khalida Delaney  Claim Number:     Employer: [employer]  Date of Injury: 12/5/2020     Insurer / TPA: [employer] INS ID / SSN: [id number]   Occupation:  Diagnosis: The encounter diagnosis was Right inguinal pain.    Medical Information   Related to Industrial Injury? Yes ***   Subjective Complaints:  Right groin pain   Objective Findings: Right groin tenderness   Pre-Existing Condition(s):     Assessment:   Condition Same    Status: Additional Care Required  Permanent Disability:No    Plan: Medication    Diagnostics:      Comments:       Disability Information   Status: Released to Restricted Duty    From:  12/27/2020  Through: 12/30/2020 Restrictions are: Temporary   Physical Restrictions   Sitting:    Standing:  Occasionally Stooping:  Occasionally Bending:      Squatting:  Occasionally Walking:  Occasionally Climbing:    Pushing:  Occasionally   Pulling:    Other:    Reaching Above Shoulder (L):   Reaching Above Shoulder (R):       Reaching Below Shoulder (L):    Reaching Below Shoulder (R):      Not to exceed Weight Limits   Carrying(hrs):   Weight Limit(lb): < or = to 10 pounds Lifting(hrs):   Weight  Limit(lb): < or = to 10 pounds   Comments:      Repetitive Actions   Hands: i.e. Fine Manipulations from Grasping:     Feet: i.e. Operating Foot Controls:     Driving / Operate Machinery:     Physician Name: José Miguel Ren Physician Signature: JOSÉ MIGUEL Grace M.D. e-Signature:  , Medical Director   Clinic Name / Location: [facility], EMERGENCY DEPT  [address]  [address]  [phone]     Clinic Phone Number: Dept: [phone]   Appointment Time:  Visit Start Time:    Check-In Time:  9:26 AM Visit Discharge Time:    Original-Treating Physician or Chiropractor    Page 2-Insurer/TPA    Page 3-Employer    Page 4-Employee

## 2020-12-27 NOTE — DISCHARGE INSTRUCTIONS
Take Motrin and Tylenol as discussed    No pushing or lifting greater than 5 pounds while at work and no prolonged standing or walking until cleared by occupational health

## 2020-12-27 NOTE — ED NOTES
Wheeled to room by this RN, assisted in transferring from wheelchair to gurney. Ambulated slow using cane, states pain increases during movement.  Changing into a hospital gown. Chart up for erp to see. Call light within reach. Instructed to call staff for any assistance.

## 2020-12-30 ENCOUNTER — OFFICE VISIT (OUTPATIENT)
Dept: URGENT CARE | Facility: CLINIC | Age: 60
End: 2020-12-30
Payer: MEDICARE

## 2020-12-30 ENCOUNTER — TELEPHONE (OUTPATIENT)
Dept: OCCUPATIONAL MEDICINE | Facility: CLINIC | Age: 60
End: 2020-12-30

## 2020-12-30 VITALS
RESPIRATION RATE: 14 BRPM | TEMPERATURE: 97.6 F | SYSTOLIC BLOOD PRESSURE: 128 MMHG | DIASTOLIC BLOOD PRESSURE: 72 MMHG | BODY MASS INDEX: 29.77 KG/M2 | HEART RATE: 86 BPM | WEIGHT: 168 LBS | HEIGHT: 63 IN | OXYGEN SATURATION: 96 %

## 2020-12-30 DIAGNOSIS — R10.31 RIGHT GROIN PAIN: ICD-10-CM

## 2020-12-30 DIAGNOSIS — M25.551 RIGHT HIP PAIN: ICD-10-CM

## 2020-12-30 PROCEDURE — 99213 OFFICE O/P EST LOW 20 MIN: CPT | Performed by: FAMILY MEDICINE

## 2020-12-30 ASSESSMENT — ENCOUNTER SYMPTOMS
SHORTNESS OF BREATH: 0
CHILLS: 0
SORE THROAT: 0
LEG PAIN: 1
VOMITING: 0
MYALGIAS: 0
FEVER: 0

## 2020-12-30 ASSESSMENT — FIBROSIS 4 INDEX: FIB4 SCORE: 2.26

## 2020-12-30 NOTE — TELEPHONE ENCOUNTER
Patient left voicemail on 12/30 around 10 am. She stated that she needed to be cleared to go back to work and asked to call her job ([employer]) to let them know that she is cleared. I called back at 11:01 am (12-30-20) and asked her if it was a work related injury. She stated yes, so I looked into her media and read the claim denial letter. I let her know that we can't see her here at [facility] because her claim has been denied but she can go to Urgent care to get released to work. She was not listening and kept talking over me. I explained that she had to go there and she said she was going to go to [facility] to get a note. I explained it to her for the third time and she said that she will go to [facility]gent care to get seen.

## 2020-12-30 NOTE — LETTER
December 30, 2020         Patient: Khalida Delaney   YOB: 1960   Date of Visit: 12/30/2020           To Whom it May Concern:    Khalida Delaney was seen in my clinic on 12/30/2020. She may return to work on 12/30/2020.    If you have any questions or concerns, please don't hesitate to call.        Sincerely,           Heber Brown M.D.  Electronically Signed

## 2020-12-30 NOTE — PROGRESS NOTES
Subjective:   Khalida Delaney is a 60 y.o. female who presents for Leg Pain (x 24 days between hip and thigh. Pt. was denies WC claim and needs a release back to work.)        60-year-old female presents to urgent care with a chief complaint of right groin pain over the past month.  The patient complains of right anterior groin and hip pain onset 12/5/2020.  The patient experienced limiting right groin and hip pain worse tonight after completing a work shift and was initially evaluated in the occupational health and x-ray imaging demonstrated no acute fractures of the right hip.  The patient was seen in 12/21/2020 for the final occupational health visit and released from care back to work full duty.  The patient continued to experience pain in the right anterior groin and return to the emergency department on 12/27/2020 and advised light duty at work.  The patient was advised today the Workmen's Compensation claim was denied and accordingly patient presents to urgent care and follow-up.  The patient denies limiting pain in the right anterior groin or hip at this time and denies functional limitations performing full duties as a .  The patient is specifically requesting a return to work note for today's date 12/30/2020.    Leg Pain  Pertinent negatives include no chills, fever, myalgias, rash, sore throat or vomiting.     PMH:  has a past medical history of Arthritis, Back pain, Bipolar 2 disorder, Chronic LBP, Chronic neck pain, Depression, Hep C w/o coma, chronic (HCC), HTN (hypertension) (6/2/2016), Melanosis coli (4/20/2017), Shoulder pain, right, Simple cyst of kidney right (6/1/2012), and Tobacco dependence (11/2/2016).  MEDS:   Current Outpatient Medications:   •  busPIRone (BUSPAR) 30 MG tablet, Take  by mouth. Take 1 tablet by mouth two times a day as directed, Disp: , Rfl:   •  alprazolam (XANAX) 2 MG tablet, Take 2 mg by mouth 3 times a day., Disp: , Rfl:   •  methadone (DOLOPHINE) 10  "MG Tab, Take 70 mg by mouth every day., Disp: , Rfl:   •  Cholecalciferol (VITAMIN D3) 2000 UNIT Cap, TAKE 2 CAPSULES BY MOUTH EVERY DAY, Disp: 180 Cap, Rfl: 1  •   MG Tab, Take 800 mg by mouth every 6 hours as needed., Disp: , Rfl: 0  •  lamotrigine (LAMICTAL) 150 MG tablet, 150 mg every day., Disp: , Rfl:   •  busPIRone (BUSPAR) 10 MG Tab tablet, Take 20 mg by mouth 2 Times a Day., Disp: , Rfl:   •  ziprasidone (GEODON) 80 MG Cap, Take 160 mg by mouth every bedtime., Disp: , Rfl: 1  •  Multiple Vitamins-Minerals (MULTIVITAMIN PO), Take 1 Tab by mouth every day at 6 PM., Disp: , Rfl:   ALLERGIES:   Allergies   Allergen Reactions   • Nkda [No Known Drug Allergy]      SURGHX:   Past Surgical History:   Procedure Laterality Date   • APPENDECTOMY     • PRIMARY C SECTION       SOCHX:  reports that she has been smoking. She has smoked for the past 35.00 years. She has never used smokeless tobacco. She reports that she does not drink alcohol or use drugs.  FH:   Family History   Problem Relation Age of Onset   • Cancer Sister 44        breast and cervical/ovarian   • Hypertension Mother    • Diabetes Mother    • Cancer Mother         bone/brain cancer   • No Known Problems Father    • Genitourinary () Problems Sister         fibroid tumors   • Stroke Maternal Grandmother    • Stroke Maternal Grandfather    • Heart Disease Maternal Grandfather    • Lung Disease Neg Hx    • Hyperlipidemia Neg Hx      Review of Systems   Constitutional: Negative for chills and fever.   HENT: Negative for sore throat.    Respiratory: Negative for shortness of breath.    Gastrointestinal: Negative for vomiting.   Musculoskeletal: Negative for myalgias.   Skin: Negative for rash.        Objective:   /72   Pulse 86   Temp 36.4 °C (97.6 °F) (Temporal)   Resp 14   Ht 1.6 m (5' 3\")   Wt 76.2 kg (168 lb)   SpO2 96%   BMI 29.76 kg/m²   Physical Exam  Vitals signs and nursing note reviewed.   Constitutional:       General: She is " not in acute distress.     Appearance: She is well-developed.   HENT:      Head: Normocephalic and atraumatic.      Right Ear: External ear normal.      Left Ear: External ear normal.      Nose: Nose normal.      Mouth/Throat:      Mouth: Mucous membranes are moist.   Eyes:      Conjunctiva/sclera: Conjunctivae normal.   Cardiovascular:      Rate and Rhythm: Normal rate.   Pulmonary:      Effort: Pulmonary effort is normal. No respiratory distress.      Breath sounds: Normal breath sounds.   Abdominal:      General: There is no distension.   Musculoskeletal: Normal range of motion.      Right hip: She exhibits normal range of motion, normal strength, no tenderness and no bony tenderness.      Comments: The patient is able to ambulate with a normal gait with no functional deficits, the patient is able to perform a full squat with no functional limitations   Skin:     General: Skin is warm and dry.   Neurological:      General: No focal deficit present.      Mental Status: She is alert and oriented to person, place, and time. Mental status is at baseline.      Gait: Gait (gait at baseline) normal.   Psychiatric:         Judgment: Judgment normal.           Assessment/Plan:   1. Right groin pain    2. Right hip pain    The patient is requesting to return to work full duty and denies functional limitations performing full duty as a  at this time.  Completed return to work note as requested by the patient.    Discussed close monitoring, return precautions, and supportive measures of maintaining adequate fluid hydration and caloric intake, relative rest and symptom management as needed for pain and/or fever.    Differential diagnosis, natural history, supportive care, and indications for immediate follow-up discussed.     Advised the patient to follow-up with the primary care physician for recheck, reevaluation, and consideration of further management.    Please note that this dictation was created using  voice recognition software. I have worked with consultants from the vendor as well as technical experts from [facility] to optimize the interface. I have made every reasonable attempt to correct obvious errors, but I expect that there are errors of grammar and possibly content that I did not discover before finalizing the note.

## 2021-01-06 ENCOUNTER — NURSE TRIAGE (OUTPATIENT)
Dept: HEALTH INFORMATION MANAGEMENT | Facility: OTHER | Age: 61
End: 2021-01-06

## 2021-01-06 ENCOUNTER — OFFICE VISIT (OUTPATIENT)
Dept: MEDICAL GROUP | Facility: MEDICAL CENTER | Age: 61
End: 2021-01-06
Payer: MEDICARE

## 2021-01-06 VITALS
SYSTOLIC BLOOD PRESSURE: 128 MMHG | BODY MASS INDEX: 30.07 KG/M2 | OXYGEN SATURATION: 94 % | HEIGHT: 63 IN | WEIGHT: 169.7 LBS | TEMPERATURE: 97.7 F | DIASTOLIC BLOOD PRESSURE: 78 MMHG | HEART RATE: 64 BPM | RESPIRATION RATE: 16 BRPM

## 2021-01-06 DIAGNOSIS — M79.604 RIGHT LEG PAIN: ICD-10-CM

## 2021-01-06 PROCEDURE — 99212 OFFICE O/P EST SF 10 MIN: CPT | Performed by: NURSE PRACTITIONER

## 2021-01-06 PROCEDURE — 99214 OFFICE O/P EST MOD 30 MIN: CPT | Performed by: NURSE PRACTITIONER

## 2021-01-06 RX ORDER — TIZANIDINE HYDROCHLORIDE 2 MG/1
2-4 CAPSULE, GELATIN COATED ORAL 3 TIMES DAILY PRN
Qty: 90 CAP | Refills: 3 | Status: SHIPPED | OUTPATIENT
Start: 2021-01-06 | End: 2021-02-10

## 2021-01-06 RX ORDER — IBUPROFEN 800 MG/1
800 TABLET ORAL EVERY 8 HOURS PRN
Qty: 90 TAB | Refills: 5 | Status: SHIPPED | OUTPATIENT
Start: 2021-01-06 | End: 2021-05-17 | Stop reason: SDUPTHER

## 2021-01-06 ASSESSMENT — ENCOUNTER SYMPTOMS
SHORTNESS OF BREATH: 0
COUGH: 0
WEIGHT LOSS: 0
ABDOMINAL PAIN: 0
FEVER: 0
WHEEZING: 0
DIARRHEA: 0
BLOOD IN STOOL: 0
CHILLS: 0
CONSTIPATION: 0
PALPITATIONS: 0

## 2021-01-06 ASSESSMENT — FIBROSIS 4 INDEX: FIB4 SCORE: 2.26
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
## 2021-01-28 ENCOUNTER — HOSPITAL ENCOUNTER (OUTPATIENT)
Dept: RADIOLOGY | Facility: MEDICAL CENTER | Age: 61
End: 2021-01-28
Attending: NURSE PRACTITIONER
Payer: MEDICARE

## 2021-01-28 DIAGNOSIS — M79.604 RIGHT LEG PAIN: ICD-10-CM

## 2021-01-28 PROCEDURE — 73721 MRI JNT OF LWR EXTRE W/O DYE: CPT | Mod: RT

## 2021-02-03 NOTE — RESULT ENCOUNTER NOTE
Please call pt and let them know taht she does have small tear in her tendons in her gluteus (buttocks muscle).  She also has some arthritis.  Please make sure that she has established with [facility] ([facility]) for further eval and treatment.  Thanks    [facility]   [address] [place] [address]   Phone: [phone]   Fax: [phone]

## 2021-02-10 ENCOUNTER — OFFICE VISIT (OUTPATIENT)
Dept: MEDICAL GROUP | Facility: MEDICAL CENTER | Age: 61
End: 2021-02-10
Attending: NURSE PRACTITIONER
Payer: MEDICARE

## 2021-02-10 VITALS
SYSTOLIC BLOOD PRESSURE: 98 MMHG | RESPIRATION RATE: 16 BRPM | HEIGHT: 63 IN | TEMPERATURE: 97.7 F | OXYGEN SATURATION: 91 % | WEIGHT: 164.6 LBS | DIASTOLIC BLOOD PRESSURE: 95 MMHG | HEART RATE: 68 BPM | BODY MASS INDEX: 29.16 KG/M2

## 2021-02-10 DIAGNOSIS — M79.604 RIGHT LEG PAIN: ICD-10-CM

## 2021-02-10 PROCEDURE — 99213 OFFICE O/P EST LOW 20 MIN: CPT | Performed by: NURSE PRACTITIONER

## 2021-02-10 RX ORDER — MEMANTINE HYDROCHLORIDE 5 MG-10 MG
KIT ORAL
COMMUNITY
Start: 2020-12-31 | End: 2021-05-17

## 2021-02-10 RX ORDER — TIZANIDINE 4 MG/1
4 TABLET ORAL EVERY 6 HOURS PRN
Qty: 90 TABLET | Refills: 3 | Status: SHIPPED | OUTPATIENT
Start: 2021-02-10 | End: 2021-05-17

## 2021-02-10 ASSESSMENT — ENCOUNTER SYMPTOMS
WHEEZING: 0
WEIGHT LOSS: 0
SHORTNESS OF BREATH: 0
ABDOMINAL PAIN: 0
CHILLS: 0
FEVER: 0
COUGH: 0
DIARRHEA: 0
PALPITATIONS: 0
CONSTIPATION: 0
BLOOD IN STOOL: 0

## 2021-02-10 ASSESSMENT — PATIENT HEALTH QUESTIONNAIRE - PHQ9: CLINICAL INTERPRETATION OF PHQ2 SCORE: 0

## 2021-02-10 ASSESSMENT — FIBROSIS 4 INDEX: FIB4 SCORE: 2.26

## 2021-02-11 NOTE — PROGRESS NOTES
No chief complaint on file.      Subjective:     HPI:   Khalida Delaney is a 60 y.o. female here to discuss the evaluation and management of:        Right leg pain  She reports some improvement in her pain.  She reports that her zanaflex is helping without side effects.  Ibuprofen is somewhat helping.  Still has pain with long periods of weight bearing and quick turns.  She denies numbness and tingling.  She has not est with ortho or PT yet.  MRI showed 2 gluteal tears, trochanteric bursitis, and osteoarthritis with mild bone spurring.      ROS  Review of Systems   Constitutional: Negative for chills, fever, malaise/fatigue and weight loss.   Respiratory: Negative for cough, shortness of breath and wheezing.    Cardiovascular: Negative for chest pain, palpitations and leg swelling.   Gastrointestinal: Negative for abdominal pain, blood in stool, constipation and diarrhea.   Musculoskeletal: Positive for joint pain.         Allergies   Allergen Reactions   • Nkda [No Known Drug Allergy]        Current medicines (including changes today)  Current Outpatient Medications   Medication Sig Dispense Refill   • Memantine HCl 28 x 5 MG & 21 x 10 MG Tab TAKE 1 TABLET BY MOUTH TWICE A DAY AS DIRECTED TAKE AS DIRECTED ON PACKAGE     • tizanidine (ZANAFLEX) 4 MG Tab Take 1 tablet by mouth every 6 hours as needed (moderate pain or spasm). 90 tablet 3   • ibuprofen (MOTRIN) 800 MG Tab Take 1 Tab by mouth every 8 hours as needed. Take with food 90 Tab 5   • busPIRone (BUSPAR) 30 MG tablet Take  by mouth. Take 1 tablet by mouth two times a day as directed     • alprazolam (XANAX) 2 MG tablet Take 2 mg by mouth 3 times a day.     • methadone (DOLOPHINE) 10 MG Tab Take 60 mg by mouth every day.     • Cholecalciferol (VITAMIN D3) 2000 UNIT Cap TAKE 2 CAPSULES BY MOUTH EVERY  Cap 1   • lamotrigine (LAMICTAL) 150 MG tablet 150 mg every day.     • ziprasidone (GEODON) 80 MG Cap Take 160 mg by mouth every bedtime.  1   •  Multiple Vitamins-Minerals (MULTIVITAMIN PO) Take 1 Tab by mouth every day at 6 PM.     • busPIRone (BUSPAR) 10 MG Tab tablet Take 20 mg by mouth 2 Times a Day.       No current facility-administered medications for this visit.       Social History     Tobacco Use   • Smoking status: Current Every Day Smoker     Years: 35.00   • Smokeless tobacco: Never Used   • Tobacco comment: pt vapes   Substance Use Topics   • Alcohol use: No     Comment: used to drink heavily, quit 1998   • Drug use: No     Comment: 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 Quit in 2004       Patient Active Problem List    Diagnosis Date Noted   • Right leg pain 01/06/2021   • Encounter to establish care 03/25/2020   • Elevated hemoglobin (HCC) 03/25/2020   • Positive FIT (fecal immunochemical test) 12/30/2018   • Lesion of right external ear 12/17/2018   • Fracture of distal end of radius 05/08/2018   • Assistance with transportation 05/08/2018   • Rib pain on left side 01/18/2018   • Overweight (BMI 25.0-29.9) 09/07/2017   • Melanosis coli 04/20/2017   • Family history of breast cancer in sister 11/02/2016   • Tobacco dependence 11/02/2016   • HTN (hypertension) 06/02/2016   • Opioid type dependence, continuous (HCC) 05/19/2016   • Osteopenia 01/22/2013   • Simple cyst of kidney right 06/01/2012   • Insomnia 01/06/2012   • Arthritis    • Chronic neck pain    • Shoulder pain, right    • Chronic low back pain    • Hep C w/o coma, chronic GENOTYPE 1A OR 1B        Family History   Problem Relation Age of Onset   • Cancer Sister 44        breast and cervical/ovarian   • Hypertension Mother    • Diabetes Mother    • Cancer Mother         bone/brain cancer   • No Known Problems Father    • Genitourinary () Problems Sister         fibroid tumors   • Stroke Maternal Grandmother    • Stroke Maternal Grandfather    • Heart Disease Maternal Grandfather    • Lung Disease Neg Hx    • Hyperlipidemia Neg Hx           Objective:     There were no vitals  taken for this visit. There is no height or weight on file to calculate BMI.    Physical Exam:  Physical Exam   Constitutional: She is oriented to person, place, and time and well-developed, well-nourished, and in no distress. No distress.   HENT:   Head: Normocephalic.   Right Ear: Tympanic membrane and external ear normal.   Left Ear: Tympanic membrane and external ear normal.   Eyes: Pupils are equal, round, and reactive to light. Conjunctivae and EOM are normal.   Neck: No tracheal deviation present.   Cardiovascular: Normal rate, regular rhythm, normal heart sounds and intact distal pulses.   Pulmonary/Chest: Effort normal and breath sounds normal.   Abdominal: Soft. Bowel sounds are normal.   Musculoskeletal:         General: Normal range of motion.      Cervical back: Normal range of motion and neck supple.   Lymphadenopathy:        Head (right side): No preauricular adenopathy present.        Head (left side): No preauricular adenopathy present.     She has no cervical adenopathy.   Neurological: She is alert and oriented to person, place, and time. She has normal sensation, normal strength and intact cranial nerves. Gait normal.   Skin: Skin is warm and dry.   Psychiatric: Affect and judgment normal.       Assessment and Plan:     The following treatment plan was discussed:    1. Right leg pain  tizanidine (ZANAFLEX) 4 MG Tab  - chronic problem, improving.  We are helping the patient get an appointment with [facility].  Patient provided phone number for physical therapy.  Zanaflex refill.       Any change or worsening of signs or symptoms, patient encouraged to follow-up or report to emergency room for further evaluation. Patient verbalizes understanding and agrees.    Follow-Up: Return if symptoms worsen or fail to improve.      PLEASE NOTE: This dictation was created using voice recognition software. I have made every reasonable attempt to correct obvious errors, but I expect that there are errors of grammar and  possibly content that I did not discover before finalizing the note.

## 2021-02-11 NOTE — PATIENT INSTRUCTIONS
[facility]   [address] [place] [address]   Phone: [phone]   Fax: [phone]?      Physical Therapy-  Phys Therapy [facility] [address] .   [place] [address]  Phone: [phone]

## 2021-02-11 NOTE — ASSESSMENT & PLAN NOTE
She reports some improvement in her pain.  She reports that her zanaflex is helping without side effects.  Ibuprofen is somewhat helping.  Still has pain with long periods of weight bearing and quick turns.  She denies numbness and tingling.  She has not est with ortho or PT yet.  MRI showed 2 gluteal tears, trochanteric bursitis, and osteoarthritis with mild bone spurring.

## 2021-02-16 ENCOUNTER — TELEPHONE (OUTPATIENT)
Dept: MEDICAL GROUP | Facility: MEDICAL CENTER | Age: 61
End: 2021-02-16

## 2021-02-17 NOTE — TELEPHONE ENCOUNTER
VOICEMAIL  1. Caller Name: ANGELITO OMKAR PETERSON                      Call Back Number: [phone] (home)     2. Message: Pt. Is requesting medical records for her , referred pt. To medical records.  3. Patient approves office to leave a detailed voicemail/MyChart message: N\A

## 2021-04-06 ENCOUNTER — NURSE TRIAGE (OUTPATIENT)
Dept: HEALTH INFORMATION MANAGEMENT | Facility: OTHER | Age: 61
End: 2021-04-06

## 2021-04-06 NOTE — TELEPHONE ENCOUNTER
"Pt has had a torn right groin muscle from a slip/fall at work dec 3.  Pt has an appointment with PCP tomorrow. Pt states she called out of work yesterday for the pain, but that today she feels better and is able to walk. Pt will continue to ice/heat area, alternate tylenol/ibuprofen until appointment tomorrow. Will jono back with any questions.  Reason for Disposition  • [1] MODERATE pain (e.g., interferes with normal activities, limping) AND [2] present > 3 days    Additional Information  • Negative: Looks like a broken bone or dislocated joint (e.g., crooked or deformed)  • Negative: Sounds like a life-threatening emergency to the triager  • Negative: Followed a leg injury  • Negative: Leg swelling is main symptom  • Negative: Back pain radiating (shooting) into leg(s)  • Negative: Knee pain is main symptom  • Negative: Ankle pain is main symptom  • Negative: Pregnant  • Negative: Postpartum (from 0 to 6 weeks after delivery)  • Negative: Chest pain  • Negative: Difficulty breathing  • Negative: Entire foot is cool or blue in comparison to other side  • Negative: Unable to walk  • Negative: [1] Red area or streak AND [2] fever  • Negative: [1] Swollen joint AND [2] fever  • Negative: [1] Cast on leg or ankle AND [2] now increased pain  • Negative: Patient sounds very sick or weak to the triager  • Negative: [1] SEVERE pain (e.g., excruciating, unable to do any normal activities) AND [2] not improved after 2 hours of pain medicine  • Negative: [1] Thigh or calf pain AND [2] only 1 side AND [3] present > 1 hour  • Negative: [1] Thigh, calf, or ankle swelling AND [2] only 1 side  • Negative: [1] Thigh, calf, or ankle swelling AND [2] bilateral AND [3] 1 side is more swollen  • Negative: [1] Red area or streak AND [2] large (> 2 in. or 5 cm)  • Negative: History of prior \"blood clot\" in leg or lungs (i.e., deep vein thrombosis, pulmonary embolism)  • Negative: History of inherited increased risk of blood clots (e.g., " "Factor 5 Leiden, Anti-thrombin 3, Protein C or Protein S deficiency, Prothrombin mutation)  • Negative: Recent illness requiring prolonged bedrest (i.e., immobilization)  • Negative: Hip or leg fracture in past 2 months (e.g, or had cast on leg or ankle)  • Negative: Major surgery in the past two months  • Negative: Cancer treatment in the past two months (or has cancer now)  • Negative: Recent long-distance travel with prolonged time in car, bus, plane, or train (i.e., within past 2 weeks; 6 or  more hours duration)  • Negative: [1] Painful rash AND [2] multiple small blisters grouped together (i.e., dermatomal distribution or \"band\" or \"stripe\")  • Negative: Looks like a boil, infected sore, deep ulcer or other infected rash (spreading redness, pus)  • Negative: [1] Localized rash is very painful AND [2] no fever  • Negative: Numbness in a leg or foot (i.e., loss of sensation)  • Negative: Localized pain, redness or hard lump along vein  • Negative: [1] Swollen joint AND [2] no fever or redness  • Negative: [1] Leg pain which occurs after walking a certain distance AND [2] disappears with rest AND [3] age > 50  • Negative: [1] Pain in front of the lower leg(s) (shins)     AND [2] occurs with running or jumping exercise  (e.g., jogging,  basketball)  • Negative: [1] MILD pain (e.g., does not interfere with normal activities) AND [2] present > 7 days  • Negative: Leg pain or muscle cramp is a chronic symptom (recurrent or ongoing AND present > 4 weeks)  • Negative: Leg pain  • Negative: Caused by strained muscle  • Negative: Caused by overuse from recent vigorous activity (e.g.,  aerobics, jogging/running, physical work, prolonged walking, sports)  • Negative: [1] Caused by muscle cramps in the thigh, calf, or foot AND [2] present < 1 hour (brief, now gone)  • Negative: Caused by previously diagnosed varicose veins (same pain, worsened by prolonged standing, bulging veins in legs with worm-like appearance)  • " "Negative: Caused by phantom leg pain    Answer Assessment - Initial Assessment Questions  1. ONSET: \"When did the pain start?\"       About 3 months  2. LOCATION: \"Where is the pain located?\"       Right  3. PAIN: \"How bad is the pain?\"    (Scale 1-10; or mild, moderate, severe)    -  MILD (1-3): doesn't interfere with normal activities     -  MODERATE (4-7): interferes with normal activities (e.g., work or school) or awakens from sleep, limping     -  SEVERE (8-10): excruciating pain, unable to do any normal activities, unable to walk      Mod-severe  4. WORK OR EXERCISE: \"Has there been any recent work or exercise that involved this part of the body?\"       Dec 3, pt was pushing a chair, her foot slipped, thought she hurt her hip, xrays indicated she was fine. MRI showed a torn muscle.  5. CAUSE: \"What do you think is causing the leg pain?\"      Torn groin muscle  6. OTHER SYMPTOMS: \"Do you have any other symptoms?\" (e.g., chest pain, back pain, breathing difficulty, swelling, rash, fever, numbness, weakness)      Pain goes down her leg  7. PREGNANCY: \"Is there any chance you are pregnant?\" \"When was your last menstrual period?\"      no    Protocols used: LEG PAIN-A-AH    "

## 2021-04-06 NOTE — TELEPHONE ENCOUNTER
----- Message from Rin Arteaga sent at 4/6/2021  7:58 AM PDT -----  Patient recently tore he groin muscle and has an apt scheduled with her pcp tomorrow. She is looking for guidance in the meantime as far as if she can use an ice pack. She indicated it is making it hard for her to walk.

## 2021-04-13 ENCOUNTER — OFFICE VISIT (OUTPATIENT)
Dept: MEDICAL GROUP | Facility: MEDICAL CENTER | Age: 61
End: 2021-04-13
Attending: PHYSICIAN ASSISTANT
Payer: MEDICARE

## 2021-04-13 VITALS
HEIGHT: 63 IN | OXYGEN SATURATION: 97 % | RESPIRATION RATE: 16 BRPM | WEIGHT: 166 LBS | BODY MASS INDEX: 29.41 KG/M2 | DIASTOLIC BLOOD PRESSURE: 80 MMHG | HEART RATE: 76 BPM | TEMPERATURE: 97.3 F | SYSTOLIC BLOOD PRESSURE: 130 MMHG

## 2021-04-13 DIAGNOSIS — M79.604 RIGHT LEG PAIN: ICD-10-CM

## 2021-04-13 PROCEDURE — 99213 OFFICE O/P EST LOW 20 MIN: CPT | Performed by: PHYSICIAN ASSISTANT

## 2021-04-13 PROCEDURE — 99212 OFFICE O/P EST SF 10 MIN: CPT | Performed by: PHYSICIAN ASSISTANT

## 2021-04-13 ASSESSMENT — FIBROSIS 4 INDEX: FIB4 SCORE: 2.26

## 2021-04-14 NOTE — PROGRESS NOTES
"Chief Complaint   Patient presents with   • Follow-Up       Subjective:     HPI:   Khalida Delaney is a 60 y.o. female here to discuss the evaluation and management of:    Right leg pain  Khalida presents today for follow-up regarding chronic right leg pain.she had an MRI of the right hip completed which showed the following: \"Grade 2 gluteus minimus, grade 1 gluteus medius muscle strains and there is mild underlying greater trochanteric reactive marrow edema. Moderate greater trochanteric bursitis. Mild acetabular spurring and moderate lumbosacral junction facet arthropathy, mild SI joint osteoarthritis.\" She reports continued chronic pain and decreased range of motion of the right leg and hip.  Her PCP submitted referrals to orthopedics and physical therapy. However, she reports that she has not received any information in regards to getting scheduled with either specialist.    ROS  See HPI.     Allergies   Allergen Reactions   • Nkda [No Known Drug Allergy]        Current medicines (including changes today)  Current Outpatient Medications   Medication Sig Dispense Refill   • Memantine HCl 28 x 5 MG & 21 x 10 MG Tab TAKE 1 TABLET BY MOUTH TWICE A DAY AS DIRECTED TAKE AS DIRECTED ON PACKAGE     • tizanidine (ZANAFLEX) 4 MG Tab Take 1 tablet by mouth every 6 hours as needed (moderate pain or spasm). 90 tablet 3   • ibuprofen (MOTRIN) 800 MG Tab Take 1 Tab by mouth every 8 hours as needed. Take with food 90 Tab 5   • busPIRone (BUSPAR) 30 MG tablet Take  by mouth. Take 1 tablet by mouth two times a day as directed     • alprazolam (XANAX) 2 MG tablet Take 2 mg by mouth 3 times a day.     • methadone (DOLOPHINE) 10 MG Tab Take 60 mg by mouth every day.     • Cholecalciferol (VITAMIN D3) 2000 UNIT Cap TAKE 2 CAPSULES BY MOUTH EVERY  Cap 1   • lamotrigine (LAMICTAL) 150 MG tablet 150 mg every day.     • busPIRone (BUSPAR) 10 MG Tab tablet Take 20 mg by mouth 2 Times a Day.     • ziprasidone (GEODON) 80 MG Cap " Take 160 mg by mouth every bedtime.  1   • Multiple Vitamins-Minerals (MULTIVITAMIN PO) Take 1 Tab by mouth every day at 6 PM.       No current facility-administered medications for this visit.       Social History     Tobacco Use   • Smoking status: Current Every Day Smoker     Years: 35.00   • Smokeless tobacco: Never Used   • Tobacco comment: pt vapes   Substance Use Topics   • Alcohol use: No     Comment: used to drink heavily, quit 1998   • Drug use: No     Comment: 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 Quit in 2004       Patient Active Problem List    Diagnosis Date Noted   • Right leg pain 01/06/2021   • Encounter to establish care 03/25/2020   • Elevated hemoglobin (HCC) 03/25/2020   • Positive FIT (fecal immunochemical test) 12/30/2018   • Lesion of right external ear 12/17/2018   • Fracture of distal end of radius 05/08/2018   • Assistance with transportation 05/08/2018   • Rib pain on left side 01/18/2018   • Overweight (BMI 25.0-29.9) 09/07/2017   • Melanosis coli 04/20/2017   • Family history of breast cancer in sister 11/02/2016   • Tobacco dependence 11/02/2016   • HTN (hypertension) 06/02/2016   • Opioid type dependence, continuous (HCC) 05/19/2016   • Osteopenia 01/22/2013   • Simple cyst of kidney right 06/01/2012   • Insomnia 01/06/2012   • Arthritis    • Chronic neck pain    • Shoulder pain, right    • Chronic low back pain    • Hep C w/o coma, chronic GENOTYPE 1A OR 1B        Family History   Problem Relation Age of Onset   • Cancer Sister 44        breast and cervical/ovarian   • Hypertension Mother    • Diabetes Mother    • Cancer Mother         bone/brain cancer   • No Known Problems Father    • Genitourinary () Problems Sister         fibroid tumors   • Stroke Maternal Grandmother    • Stroke Maternal Grandfather    • Heart Disease Maternal Grandfather    • Lung Disease Neg Hx    • Hyperlipidemia Neg Hx         Objective:     /80 (BP Location: Left arm, Patient Position:  "Sitting, BP Cuff Size: Adult)   Pulse 76   Temp 36.3 °C (97.3 °F) (Temporal)   Resp 16   Ht 1.6 m (5' 2.99\")   Wt 75.3 kg (166 lb)   SpO2 97%  Body mass index is 29.41 kg/m².    Physical Exam:  Physical Exam   Constitutional: She is well-developed, well-nourished, and in no distress.   Cardiovascular: Regular rhythm.   Musculoskeletal:      Right hip: Tenderness present. Decreased range of motion. Decreased strength.   Vitals reviewed.    Assessment and Plan:     The following treatment plan was discussed:    1. Right leg pain  - This is a subacute condition.  - PCP submitted referrals and sent the patient a [employer] message with the information to get scheduled.  However, patient reports that she did not receive this message.  I have printed out her orthopedic and PT referral information and instructed her to call and get scheduled first thing tomorrow morning for further evaluation.    Any change or worsening of signs or symptoms, patient encouraged to follow-up or report to emergency room for further evaluation. Patient verbalizes understanding and agrees.    Follow-Up: Return if symptoms worsen or fail to improve.      PLEASE NOTE: This dictation was created using voice recognition software. I have made every reasonable attempt to correct obvious errors, but I expect that there are errors of grammar and possibly content that I did not discover before finalizing the note.  "

## 2021-04-14 NOTE — ASSESSMENT & PLAN NOTE
"Khalida presents today for follow-up regarding chronic right leg pain.she had an MRI of the right hip completed which showed the following: \"Grade 2 gluteus minimus, grade 1 gluteus medius muscle strains and there is mild underlying greater trochanteric reactive marrow edema. Moderate greater trochanteric bursitis. Mild acetabular spurring and moderate lumbosacral junction facet arthropathy, mild SI joint osteoarthritis.\" She reports continued chronic pain and decreased range of motion of the right leg and hip.  Her PCP submitted referrals to orthopedics and physical therapy. However, she reports that she has not received any information in regards to getting scheduled with either specialist.      "

## 2021-05-17 ENCOUNTER — OFFICE VISIT (OUTPATIENT)
Dept: MEDICAL GROUP | Facility: MEDICAL CENTER | Age: 61
End: 2021-05-17
Attending: NURSE PRACTITIONER
Payer: MEDICARE

## 2021-05-17 VITALS
RESPIRATION RATE: 16 BRPM | HEART RATE: 60 BPM | DIASTOLIC BLOOD PRESSURE: 66 MMHG | HEIGHT: 63 IN | BODY MASS INDEX: 28.88 KG/M2 | WEIGHT: 163 LBS | SYSTOLIC BLOOD PRESSURE: 112 MMHG | TEMPERATURE: 97.7 F | OXYGEN SATURATION: 94 %

## 2021-05-17 DIAGNOSIS — Z72.0 TOBACCO ABUSE: ICD-10-CM

## 2021-05-17 DIAGNOSIS — R32 URINARY INCONTINENCE, UNSPECIFIED TYPE: ICD-10-CM

## 2021-05-17 DIAGNOSIS — M79.604 RIGHT LEG PAIN: ICD-10-CM

## 2021-05-17 PROCEDURE — 99214 OFFICE O/P EST MOD 30 MIN: CPT | Performed by: NURSE PRACTITIONER

## 2021-05-17 PROCEDURE — 99212 OFFICE O/P EST SF 10 MIN: CPT | Performed by: NURSE PRACTITIONER

## 2021-05-17 RX ORDER — IBUPROFEN 800 MG/1
800 TABLET ORAL EVERY 8 HOURS PRN
Qty: 90 TABLET | Refills: 5 | Status: SHIPPED | OUTPATIENT
Start: 2021-05-17

## 2021-05-17 RX ORDER — NICOTINE 21 MG/24HR
1 PATCH, TRANSDERMAL 24 HOURS TRANSDERMAL EVERY 24 HOURS
Qty: 30 PATCH | Refills: 2 | Status: SHIPPED | OUTPATIENT
Start: 2021-05-17 | End: 2021-08-17

## 2021-05-17 RX ORDER — LITHIUM CARBONATE 150 MG/1
CAPSULE ORAL
COMMUNITY
Start: 2021-04-20 | End: 2021-05-17

## 2021-05-17 RX ORDER — MEMANTINE HYDROCHLORIDE 10 MG/1
10 TABLET ORAL 2 TIMES DAILY PRN
COMMUNITY
Start: 2021-03-19 | End: 2021-05-17

## 2021-05-17 ASSESSMENT — ENCOUNTER SYMPTOMS
MYALGIAS: 1
CHILLS: 0
WEIGHT LOSS: 0
PALPITATIONS: 0
DIARRHEA: 0
WHEEZING: 0
CONSTIPATION: 0
BLOOD IN STOOL: 0
FEVER: 0
ABDOMINAL PAIN: 0
SHORTNESS OF BREATH: 0
COUGH: 0

## 2021-05-17 NOTE — PATIENT INSTRUCTIONS
Transdermal patch: Adjustment may be required during initial treatment (move to higher dose if experiencing withdrawal symptoms; lower dose if side effects are experienced). Apply new patch to nonhairy, clean, dry skin on the upper body or upper outer arm; each patch should be applied to a different site. Apply immediately after removing backing from patch; press onto skin for ~10 seconds. Patch may be worn for 16 or 24 hours. If cigarette cravings occur upon awakening, wear for 24 hours; if vivid dreams or other sleep disturbances occur, remove the patch at bedtime and apply a new patch in the morning. Do not cut patch; causes rapid evaporation, rendering the patch useless. Do not wear more than 1 patch at a time; do not leave patch on for more than 24 hours (may irritate skin). Wash hands after applying or removing patch. Discard patches by folding adhesive ends together, replace in pouch and dispose of properly in trash.    Patients smoking >10 cigarettes/day:   Begin with step 1 (21 mg/day) for 6 weeks, followed by step 2 (14 mg/day) for 2 weeks; finish with step 3 (7 mg/day) for 2 weeks     Patients smoking ?10 cigarettes/day:   Begin with step 2 (14 mg/day) for 6 weeks, followed by step 3 (7 mg/day) for 2 weeks     [facility] [phone]

## 2021-05-17 NOTE — ASSESSMENT & PLAN NOTE
Her hip pain continues- she had an MRI in 1/2021 that showed gluteus tears, trochanteric bursitis.  Pt reports that she did not follow up with ortho.  She works as a  so she experiences pain daily.  She denies numbness, tingling, popping and clicking.  She has pain with bearing weight.   She reports that tizanidine and ibuprofen did not help much.

## 2021-05-17 NOTE — PROGRESS NOTES
Chief Complaint   Patient presents with   • Groin Injury     Right Side       Subjective:     HPI:   Khalida Delaney is a 60 y.o. female here to discuss the evaluation and management of:        Right leg pain  Her hip pain continues- she had an MRI in 1/2021 that showed gluteus tears, trochanteric bursitis.  Pt reports that she did not follow up with ortho.  She works as a  so she experiences pain daily.  She denies numbness, tingling, popping and clicking.  She has pain with bearing weight.   She reports that tizanidine and ibuprofen did not help much.       Urinary incontinence  Present for about a year.  She has urge incontinence, but sometimes at NOC she will urinate without any sensation.  She wears briefs at NOC.      Tobacco dependence  She smokes about a half a pack a day.  She does not smoke within 30 minutes of waking up.  She has tried to quit in the past, but did not have support.        ROS  Review of Systems   Constitutional: Negative for chills, fever, malaise/fatigue and weight loss.   Respiratory: Negative for cough, shortness of breath and wheezing.    Cardiovascular: Negative for chest pain, palpitations and leg swelling.   Gastrointestinal: Negative for abdominal pain, blood in stool, constipation and diarrhea.   Musculoskeletal: Positive for joint pain and myalgias.         Allergies   Allergen Reactions   • Nkda [No Known Drug Allergy]        Current medicines (including changes today)  Current Outpatient Medications   Medication Sig Dispense Refill   • Misc. Devices Misc Please provide the patient with a package of incontinence briefs monthly 1 Each 11   • ibuprofen (MOTRIN) 800 MG Tab Take 1 tablet by mouth every 8 hours as needed for Moderate Pain. Take with food 90 tablet 5   • nicotine (NICODERM) 14 MG/24HR PATCH 24 HR Place 1 Patch on the skin every 24 hours. 30 Patch 2   • alprazolam (XANAX) 2 MG tablet Take 2 mg by mouth 3 times a day.     • methadone (DOLOPHINE) 10  MG Tab Take 60 mg by mouth every day.     • Cholecalciferol (VITAMIN D3) 2000 UNIT Cap TAKE 2 CAPSULES BY MOUTH EVERY  Cap 1   • lamotrigine (LAMICTAL) 150 MG tablet 150 mg every day.     • busPIRone (BUSPAR) 10 MG Tab tablet Take 20 mg by mouth 2 Times a Day.     • ziprasidone (GEODON) 80 MG Cap Take 160 mg by mouth every bedtime.  1   • Multiple Vitamins-Minerals (MULTIVITAMIN PO) Take 1 Tab by mouth every day at 6 PM.     • busPIRone (BUSPAR) 30 MG tablet Take  by mouth. Take 1 tablet by mouth two times a day as directed       No current facility-administered medications for this visit.       Social History     Tobacco Use   • Smoking status: Current Every Day Smoker     Years: 35.00   • Smokeless tobacco: Never Used   • Tobacco comment: pt vapes   Vaping Use   • Vaping Use: Every day   • Substances: Nicotine   • Devices: Pre-filled pod   Substance Use Topics   • Alcohol use: No     Comment: used to drink heavily, quit 1998   • Drug use: No     Comment: 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 Quit in 2004       Patient Active Problem List    Diagnosis Date Noted   • Urinary incontinence 05/17/2021   • Right leg pain 01/06/2021   • Encounter to establish care 03/25/2020   • Elevated hemoglobin (HCC) 03/25/2020   • Positive FIT (fecal immunochemical test) 12/30/2018   • Lesion of right external ear 12/17/2018   • Fracture of distal end of radius 05/08/2018   • Assistance with transportation 05/08/2018   • Rib pain on left side 01/18/2018   • Overweight (BMI 25.0-29.9) 09/07/2017   • Melanosis coli 04/20/2017   • Family history of breast cancer in sister 11/02/2016   • Tobacco dependence 11/02/2016   • HTN (hypertension) 06/02/2016   • Opioid type dependence, continuous (HCC) 05/19/2016   • Osteopenia 01/22/2013   • Simple cyst of kidney right 06/01/2012   • Insomnia 01/06/2012   • Arthritis    • Chronic neck pain    • Shoulder pain, right    • Chronic low back pain    • Hep C w/o coma, chronic GENOTYPE  "1A OR 1B        Family History   Problem Relation Age of Onset   • Cancer Sister 44        breast and cervical/ovarian   • Hypertension Mother    • Diabetes Mother    • Cancer Mother         bone/brain cancer   • No Known Problems Father    • Genitourinary () Problems Sister         fibroid tumors   • Stroke Maternal Grandmother    • Stroke Maternal Grandfather    • Heart Disease Maternal Grandfather    • Lung Disease Neg Hx    • Hyperlipidemia Neg Hx           Objective:     /66 (BP Location: Left arm, Patient Position: Sitting, BP Cuff Size: Adult)   Pulse 60   Temp 36.5 °C (97.7 °F) (Temporal)   Resp 16   Ht 1.6 m (5' 3\")   Wt 73.9 kg (163 lb)   SpO2 94%  Body mass index is 28.87 kg/m².    Physical Exam:  Physical Exam  Constitutional:       General: She is not in acute distress.  HENT:      Head: Normocephalic.      Right Ear: Tympanic membrane and external ear normal.      Left Ear: Tympanic membrane and external ear normal.   Eyes:      Conjunctiva/sclera: Conjunctivae normal.      Pupils: Pupils are equal, round, and reactive to light.   Neck:      Trachea: No tracheal deviation.   Cardiovascular:      Rate and Rhythm: Normal rate and regular rhythm.      Heart sounds: Normal heart sounds.   Pulmonary:      Effort: Pulmonary effort is normal.      Breath sounds: Normal breath sounds.   Abdominal:      General: Bowel sounds are normal.      Palpations: Abdomen is soft.   Musculoskeletal:         General: Normal range of motion.      Cervical back: Normal range of motion and neck supple.      Right upper leg: Bony tenderness present.   Lymphadenopathy:      Head:      Right side of head: No preauricular adenopathy.      Left side of head: No preauricular adenopathy.      Cervical: No cervical adenopathy.   Skin:     General: Skin is warm and dry.   Neurological:      Mental Status: She is alert and oriented to person, place, and time.      Cranial Nerves: Cranial nerves are intact.      Sensory: " Sensation is intact.      Gait: Gait is intact.   Psychiatric:         Mood and Affect: Affect normal.         Judgment: Judgment normal.         Assessment and Plan:     The following treatment plan was discussed:    1. Right leg pain  ibuprofen (MOTRIN) 800 MG Tab    REFERRAL TO ORTHOPEDICS  -Chronic problem, unstable.  Suspect her pain is related to ongoing trochanteric bursitis.  We discussed treatment options include NSAIDs and steroid injection to the hip.  She is somewhat apprehensive to get an injection to the hip, but we did discuss the importance of following with orthopedics.  Patient verbalized understanding.   2. Urinary incontinence, unspecified type  Misc. Devices Misc    REFERRAL TO UROLOGY  -Chronic problem, unstable.  Referral to urology placed.  We will order some incontinence pads for the patient.   3. Tobacco abuse  nicotine (NICODERM) 14 MG/24HR PATCH 24 HR  -Chronic problem, unstable.  We will try NicoDerm patches.  Patient will notify me if she thinks she needs a higher lower dose. Potential side effects and discontinuation criteria were discussed with patient, patient verbalized understanding.         Any change or worsening of signs or symptoms, patient encouraged to follow-up or report to emergency room for further evaluation. Patient verbalizes understanding and agrees.    Follow-Up: Return if symptoms worsen or fail to improve.      PLEASE NOTE: This dictation was created using voice recognition software. I have made every reasonable attempt to correct obvious errors, but I expect that there are errors of grammar and possibly content that I did not discover before finalizing the note.

## 2021-05-17 NOTE — ASSESSMENT & PLAN NOTE
Present for about a year.  She has urge incontinence, but sometimes at NOC she will urinate without any sensation.  She wears briefs at NOC.

## 2021-05-18 NOTE — ASSESSMENT & PLAN NOTE
She smokes about a half a pack a day.  She does not smoke within 30 minutes of waking up.  She has tried to quit in the past, but did not have support.

## 2021-06-07 NOTE — ED NOTES
From: Kristopher Garrison DO  Sent: 6/7/2021 4:09 PM CDT  Subject: Prescription Question    Hi. Forest Sheikh has sent a request for the prescription renewal on my insulin.  can you please take care of that so they can have it ready asap Pt given d/c instructions, f/u info and RX x 1 with verbal understanding.  Pt reviewed and signed Narcotic medication agreement with ERP.  VSS at discharge. Pt ambulatory from the ED w/ steady gait.  All belongings in possession on discharge.  Pt escorted to the lobby by nurse apprentice.

## 2021-08-10 ENCOUNTER — OFFICE VISIT (OUTPATIENT)
Dept: MEDICAL GROUP | Facility: MEDICAL CENTER | Age: 61
End: 2021-08-10
Attending: FAMILY MEDICINE
Payer: MEDICARE

## 2021-08-10 VITALS
OXYGEN SATURATION: 95 % | WEIGHT: 150 LBS | DIASTOLIC BLOOD PRESSURE: 94 MMHG | TEMPERATURE: 97.1 F | BODY MASS INDEX: 26.58 KG/M2 | SYSTOLIC BLOOD PRESSURE: 152 MMHG | HEART RATE: 69 BPM | RESPIRATION RATE: 16 BRPM | HEIGHT: 63 IN

## 2021-08-10 DIAGNOSIS — M25.512 ACUTE PAIN OF LEFT SHOULDER: ICD-10-CM

## 2021-08-10 PROCEDURE — 99212 OFFICE O/P EST SF 10 MIN: CPT | Performed by: FAMILY MEDICINE

## 2021-08-10 PROCEDURE — 99213 OFFICE O/P EST LOW 20 MIN: CPT | Performed by: FAMILY MEDICINE

## 2021-08-10 RX ORDER — BACLOFEN 10 MG/1
10 TABLET ORAL 3 TIMES DAILY
Qty: 42 TABLET | Refills: 0 | Status: SHIPPED | OUTPATIENT
Start: 2021-08-10 | End: 2021-09-23

## 2021-08-10 NOTE — ASSESSMENT & PLAN NOTE
Pt reports 1d history of acute left posterior shoulder pain that radiates into the left neck and down the left thoracic back.  She is right handed  She started a new job as a  about 1.5 weeks ago.   Woke up with the pain yesterday, cannot recall an acute traumas  She has been taking ibuprofen 1600mg every 4-6 hours, also on methadone 60mg daily. She has also been trying icy hot, heating pad.  Worse with resting the shoulder at her side. She states it feels better when the left arm is across her body and holds it with her other arm.

## 2021-08-10 NOTE — LETTER
August 10, 2021              To whom it may concern:     Khalida Delaney was seen in our office on 08/10/2021.    If you have any questions or concerns, please don't hesitate to call.        Sincerely,        Darlene Dickson M.D.    Electronically Signed

## 2021-08-10 NOTE — PATIENT INSTRUCTIONS
Three times a day, take the following with food - baclofen 10mg, tylenol 500mg, ibuprofen 200-400mg, over the counter muscle rubs. Do this for up to 2 weeks, but you can reduce the amount you take it if you start getting better.   You can also try the following topicals:   - salonpas  - biofreeze  - arnicare  - aspercreme  - capsaicin (more for nerve related problems)  - tiger balm  - voltaren (this is like ibuprofen, but is placed on the skin)

## 2021-08-10 NOTE — PROGRESS NOTES
Subjective:     CC:   Chief Complaint   Patient presents with   • Pain     Left side neck, shoulder and arm         HPI:     Acute pain of left shoulder  Pt reports 1d history of acute left posterior shoulder pain that radiates into the left neck and down the left thoracic back.  She is right handed  She started a new job as a  about 1.5 weeks ago.   Woke up with the pain yesterday, cannot recall an acute traumas  She has been taking ibuprofen 1600mg every 4-6 hours, also on methadone 60mg daily. She has also been trying icy hot, heating pad.  Worse with resting the shoulder at her side. She states it feels better when the left arm is across her body and holds it with her other arm.        Past Medical History:   Diagnosis Date   • Arthritis    • Back pain    • Bipolar 2 disorder    • Chronic LBP     chronic upper back pain   • Chronic neck pain    • Depression    • Hep C w/o coma, chronic (HCC)    • HTN (hypertension) 6/2/2016   • Melanosis coli 4/20/2017   • Shoulder pain, right     chronic   • Simple cyst of kidney right 6/1/2012   • Tobacco dependence 11/2/2016       Social History     Tobacco Use   • Smoking status: Current Every Day Smoker     Years: 35.00   • Smokeless tobacco: Never Used   • Tobacco comment: pt vapes   Vaping Use   • Vaping Use: Every day   • Substances: Nicotine   • Devices: Pre-filled pod   Substance Use Topics   • Alcohol use: No     Comment: used to drink heavily, quit 1998   • Drug use: No     Comment: 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 Quit in 2004       Current Outpatient Medications Ordered in Epic   Medication Sig Dispense Refill   • baclofen (LIORESAL) 10 MG Tab Take 1 tablet by mouth 3 times a day. 42 tablet 0   • ibuprofen (MOTRIN) 800 MG Tab Take 1 tablet by mouth every 8 hours as needed for Moderate Pain. Take with food 90 tablet 5   • alprazolam (XANAX) 2 MG tablet Take 2 mg by mouth 3 times a day.     • methadone (DOLOPHINE) 10 MG Tab Take 60 mg by mouth  "every day.     • Cholecalciferol (VITAMIN D3) 2000 UNIT Cap TAKE 2 CAPSULES BY MOUTH EVERY  Cap 1   • lamotrigine (LAMICTAL) 150 MG tablet 150 mg every day.     • busPIRone (BUSPAR) 10 MG Tab tablet Take 20 mg by mouth 2 Times a Day.     • ziprasidone (GEODON) 80 MG Cap Take 160 mg by mouth every bedtime.  1   • Multiple Vitamins-Minerals (MULTIVITAMIN PO) Take 1 Tab by mouth every day at 6 PM.     • Misc. Devices Misc Please provide the patient with a package of incontinence briefs monthly 1 Each 11   • nicotine (NICODERM) 14 MG/24HR PATCH 24 HR Place 1 Patch on the skin every 24 hours. (Patient not taking: Reported on 8/10/2021) 30 Patch 2   • busPIRone (BUSPAR) 30 MG tablet Take  by mouth. Take 1 tablet by mouth two times a day as directed       No current Epic-ordered facility-administered medications on file.       Allergies:  Nkda [no known drug allergy]    ROS:  Gen: no fevers/chills  Eyes: no changes in vision  ENT: no sore throat  Pulm: no sob, no cough  CV: no chest pain  GI: no abd pain  : no dysuria  MSk: no myalgias  Skin: no rash  Neuro: no headaches  Heme/Lymph: no easy bruising      Objective:       Exam:  /94 (BP Location: Left arm, Patient Position: Sitting, BP Cuff Size: Adult)   Pulse 69   Temp 36.2 °C (97.1 °F) (Temporal)   Resp 16   Ht 1.6 m (5' 3\")   Wt 68 kg (150 lb)   SpO2 95%   BMI 26.57 kg/m²  Body mass index is 26.57 kg/m².    Gen: No apparent distress.  Neck: Neck is supple   MSK: Shoulder Exam  At the left shoulder    Inspection: normal, no deformity. No swelling around the shoulder    ROM: Decreased in all planes including flexion, extension, abduction, adduction, external and internal rotation, full PROM     Palpation:  Subacromial space: nontender  AC joint: nontender  Clavicle: nontender  Scapular spine: nontender  Tender at the posterior deltoid    Special tests:  Neer's: pos  Hawkin's: pos at the posterior deltoid  Empty can: pos at posterior deltoid  Drop " arm: neg  Yergason's: neg  Speed's: neg  Cross arm neg    Derm: Warm and dry. No visible rashes  Psy: Alert and oriented, Normal mood and affect. Judgment normal      Labs: none new    Assessment & Plan:     61 y.o. female with the following -     1. Acute pain of left shoulder  - baclofen (LIORESAL) 10 MG Tab; Take 1 tablet by mouth 3 times a day.  Dispense: 42 tablet; Refill: 0  Advised muscle relaxer + decreased ibuprofen + tylenol + topical muscle rub tid for at least 1 week, up to 2 weeks. Can decrease use if getting better. Advised rest if possible    Return if symptoms worsen or fail to improve.    Please note that this dictation was created using voice recognition software. I have made every reasonable attempt to correct obvious errors, but I expect that there are errors of grammar and possibly content that I did not discover before finalizing the note.

## 2021-08-12 ENCOUNTER — OFFICE VISIT (OUTPATIENT)
Dept: MEDICAL GROUP | Facility: MEDICAL CENTER | Age: 61
End: 2021-08-12
Attending: FAMILY MEDICINE
Payer: MEDICARE

## 2021-08-12 VITALS
SYSTOLIC BLOOD PRESSURE: 144 MMHG | TEMPERATURE: 98.3 F | OXYGEN SATURATION: 91 % | WEIGHT: 148.6 LBS | BODY MASS INDEX: 26.33 KG/M2 | DIASTOLIC BLOOD PRESSURE: 86 MMHG | HEIGHT: 63 IN | HEART RATE: 90 BPM | RESPIRATION RATE: 20 BRPM

## 2021-08-12 DIAGNOSIS — M25.512 ACUTE PAIN OF LEFT SHOULDER: ICD-10-CM

## 2021-08-12 PROCEDURE — 99213 OFFICE O/P EST LOW 20 MIN: CPT | Performed by: FAMILY MEDICINE

## 2021-08-12 PROCEDURE — 99214 OFFICE O/P EST MOD 30 MIN: CPT | Performed by: FAMILY MEDICINE

## 2021-08-12 RX ORDER — CYCLOBENZAPRINE HCL 10 MG
10 TABLET ORAL 3 TIMES DAILY PRN
Qty: 30 TABLET | Refills: 0 | Status: ON HOLD | OUTPATIENT
Start: 2021-08-12 | End: 2021-08-20

## 2021-08-12 NOTE — PROGRESS NOTES
Subjective:     CC:   Chief Complaint   Patient presents with   • Arm Pain     left arm- hurt at work         HPI:     Acute pain of left shoulder  Patient experienced to trauma while she was holding a tray on the left hand, and left posterior shoulder was ran into a wall.  Patient reports continued pain of the left shoulder, now it is extending down to the posterior humerus.  She states that she has been taking the baclofen, reduced dose of ibuprofen, Tylenol 3 times a day for the last 2 days.  She states that she cannot go to work, would appreciate a work note as well.      Past Medical History:   Diagnosis Date   • Arthritis    • Back pain    • Bipolar 2 disorder    • Chronic LBP     chronic upper back pain   • Chronic neck pain    • Depression    • Hep C w/o coma, chronic (HCC)    • HTN (hypertension) 6/2/2016   • Melanosis coli 4/20/2017   • Shoulder pain, right     chronic   • Simple cyst of kidney right 6/1/2012   • Tobacco dependence 11/2/2016       Social History     Tobacco Use   • Smoking status: Current Every Day Smoker     Years: 35.00   • Smokeless tobacco: Never Used   • Tobacco comment: pt vapes   Vaping Use   • Vaping Use: Every day   • Substances: Nicotine   • Devices: Pre-filled pod   Substance Use Topics   • Alcohol use: No     Comment: used to drink heavily, quit 1998   • Drug use: No     Comment: 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 Quit in 2004       Current Outpatient Medications Ordered in Epic   Medication Sig Dispense Refill   • cyclobenzaprine (FLEXERIL) 10 mg Tab Take 1 Tablet by mouth 3 times a day as needed. 30 Tablet 0   • baclofen (LIORESAL) 10 MG Tab Take 1 tablet by mouth 3 times a day. 42 tablet 0   • Misc. Devices Misc Please provide the patient with a package of incontinence briefs monthly 1 Each 11   • ibuprofen (MOTRIN) 800 MG Tab Take 1 tablet by mouth every 8 hours as needed for Moderate Pain. Take with food 90 tablet 5   • nicotine (NICODERM) 14 MG/24HR PATCH 24 HR  "Place 1 Patch on the skin every 24 hours. (Patient not taking: Reported on 8/10/2021) 30 Patch 2   • busPIRone (BUSPAR) 30 MG tablet Take  by mouth. Take 1 tablet by mouth two times a day as directed     • alprazolam (XANAX) 2 MG tablet Take 2 mg by mouth 3 times a day.     • methadone (DOLOPHINE) 10 MG Tab Take 60 mg by mouth every day.     • Cholecalciferol (VITAMIN D3) 2000 UNIT Cap TAKE 2 CAPSULES BY MOUTH EVERY  Cap 1   • lamotrigine (LAMICTAL) 150 MG tablet 150 mg every day.     • busPIRone (BUSPAR) 10 MG Tab tablet Take 20 mg by mouth 2 Times a Day.     • ziprasidone (GEODON) 80 MG Cap Take 160 mg by mouth every bedtime.  1   • Multiple Vitamins-Minerals (MULTIVITAMIN PO) Take 1 Tab by mouth every day at 6 PM.       No current Epic-ordered facility-administered medications on file.       Allergies:  Nkda [no known drug allergy]        Objective:       Exam:  /86 (BP Location: Right arm, Patient Position: Sitting, BP Cuff Size: Adult)   Pulse 90   Temp 36.8 °C (98.3 °F) (Temporal)   Resp 20   Ht 1.6 m (5' 3\")   Wt 67.4 kg (148 lb 9.6 oz)   SpO2 91%   BMI 26.32 kg/m²  Body mass index is 26.32 kg/m².    Gen: No apparent distress.  MSK: Tender to palpation along the posterior deltoid, scapula, along the triceps.  She is still holding the arm over her head, she states that this is the most comfortable position.  Psy: Alert and oriented, Normal mood and affect. Judgment normal      Labs:   None new    Assessment & Plan:     61 y.o. female with the following -     1. Acute pain of left shoulder  - DX-SHOULDER 2+ LEFT; Future  - DX-HUMERUS 2+ LEFT; Future  - cyclobenzaprine (FLEXERIL) 10 mg Tab; Take 1 Tablet by mouth 3 times a day as needed.  Dispense: 30 Tablet; Refill: 0  I have ordered x-rays of the shoulder and humerus to assess for fractures given her severe pain.  She would like to try Flexeril instead of the baclofen, I advised her not to combine the 2 different muscle relaxers.  She is " currently on methadone, and not prescribing her opiates, however I did advise her to ask her methadone clinic to see if they are willing to increase the dose temporarily for pain relief.  Patient was also provided a sling today, which she states does help her with some pain relief.  I also given her some basic exercises to perform at home to make sure that she maintains her current range of motion to prevent frozen shoulder.  Letter was given to her to excuse her from work for the next week    Return in about 1 week (around 8/19/2021) for with ayesha thomas, acute left shoulder pain.    Please note that this dictation was created using voice recognition software. I have made every reasonable attempt to correct obvious errors, but I expect that there are errors of grammar and possibly content that I did not discover before finalizing the note.

## 2021-08-12 NOTE — LETTER
August 12, 2021    To whom it may concern,    Khalida Delaney is under my medical care. She was seen in my office on 8/10/21 and on 8/12/21 for a shoulder injury. She should be excused from work for until 8/20/21 to recover from this injury.    Sincerely,                Darlene Dickson M.D.

## 2021-08-12 NOTE — PATIENT INSTRUCTIONS
- salonpas (look for lidocaine 4%)  - biofreeze  - arnicare  - aspercreme  - capsaicin (more for nerve related problems)  - tiger balm  - voltaren (this is like ibuprofen, but is placed on the skin)

## 2021-08-12 NOTE — ASSESSMENT & PLAN NOTE
Patient experienced to trauma while she was holding a tray on the left hand, and left posterior shoulder was ran into a wall.  Patient reports continued pain of the left shoulder, now it is extending down to the posterior humerus.  She states that she has been taking the baclofen, reduced dose of ibuprofen, Tylenol 3 times a day for the last 2 days.  She states that she cannot go to work, would appreciate a work note as well.

## 2021-08-13 ENCOUNTER — APPOINTMENT (OUTPATIENT)
Dept: RADIOLOGY | Facility: IMAGING CENTER | Age: 61
End: 2021-08-13
Attending: FAMILY MEDICINE
Payer: MEDICARE

## 2021-08-13 ENCOUNTER — HOSPITAL ENCOUNTER (OUTPATIENT)
Dept: RADIOLOGY | Facility: MEDICAL CENTER | Age: 61
End: 2021-08-13
Attending: FAMILY MEDICINE
Payer: COMMERCIAL

## 2021-08-13 ENCOUNTER — NON-PROVIDER VISIT (OUTPATIENT)
Dept: OCCUPATIONAL MEDICINE | Facility: CLINIC | Age: 61
End: 2021-08-13

## 2021-08-13 ENCOUNTER — OCCUPATIONAL MEDICINE (OUTPATIENT)
Dept: OCCUPATIONAL MEDICINE | Facility: CLINIC | Age: 61
End: 2021-08-13
Payer: MEDICARE

## 2021-08-13 ENCOUNTER — TELEPHONE (OUTPATIENT)
Dept: OCCUPATIONAL MEDICINE | Facility: CLINIC | Age: 61
End: 2021-08-13

## 2021-08-13 VITALS
TEMPERATURE: 97.6 F | RESPIRATION RATE: 16 BRPM | HEART RATE: 54 BPM | OXYGEN SATURATION: 97 % | SYSTOLIC BLOOD PRESSURE: 120 MMHG | HEIGHT: 63 IN | WEIGHT: 146.6 LBS | BODY MASS INDEX: 25.98 KG/M2 | DIASTOLIC BLOOD PRESSURE: 86 MMHG

## 2021-08-13 DIAGNOSIS — S40.022D CONTUSION OF LEFT UPPER EXTREMITY, SUBSEQUENT ENCOUNTER: ICD-10-CM

## 2021-08-13 DIAGNOSIS — M25.512 ACUTE PAIN OF LEFT SHOULDER: ICD-10-CM

## 2021-08-13 DIAGNOSIS — Z02.1 PRE-EMPLOYMENT DRUG SCREENING: ICD-10-CM

## 2021-08-13 DIAGNOSIS — S46.912A STRAIN OF LEFT SHOULDER, INITIAL ENCOUNTER: ICD-10-CM

## 2021-08-13 LAB
AMP AMPHETAMINE: NORMAL
BAR BARBITURATES: NORMAL
BREATH ALCOHOL COMMENT: NORMAL
BZO BENZODIAZEPINES: NORMAL
COC COCAINE: NORMAL
INT CON NEG: NEGATIVE
INT CON POS: POSITIVE
MDMA ECSTASY: NORMAL
MET METHAMPHETAMINES: NORMAL
MTD METHADONE: NORMAL
OPI OPIATES: NORMAL
OXY OXYCODONE: NORMAL
PCP PHENCYCLIDINE: NORMAL
POC BREATHALIZER: 0 PERCENT (ref 0–0.01)
POC URINE DRUG SCREEN OCDRS: NORMAL
THC: NORMAL

## 2021-08-13 PROCEDURE — 80305 DRUG TEST PRSMV DIR OPT OBS: CPT | Performed by: PREVENTIVE MEDICINE

## 2021-08-13 PROCEDURE — 73060 X-RAY EXAM OF HUMERUS: CPT | Mod: LT

## 2021-08-13 PROCEDURE — 73030 X-RAY EXAM OF SHOULDER: CPT | Mod: LT

## 2021-08-13 PROCEDURE — 99214 OFFICE O/P EST MOD 30 MIN: CPT | Performed by: PREVENTIVE MEDICINE

## 2021-08-13 PROCEDURE — 82075 ASSAY OF BREATH ETHANOL: CPT | Performed by: PREVENTIVE MEDICINE

## 2021-08-13 NOTE — LETTER
[address],   [address] [place] [address]  Phone:  [phone] - Fax:  [phone]   Occupational Health [facility] Progress Report and Disability Certification  Date of Service: 8/13/2021   No Show:  No  Date / Time of Next Visit: 9/1/2021 8:30 AM   Claim Information   Patient Name: Khalida Delaney  Claim Number:     Employer: [employer] Date of Injury: 8/9/2021     Insurer / TPA: [employer]  ID / SSN:     Occupation:   Diagnosis: Diagnoses of Strain of left shoulder, initial encounter and Contusion of left upper extremity, subsequent encounter were pertinent to this visit.    Medical Information   Related to Industrial Injury? Yes    Subjective Complaints:  DOI 8/9/2021: 60 yo injured worker presents with left shoulder injury. GARRET: She was coming out of the kitchen, moved to go around a coworker and hit her left shoulder on the wall.  Pain was minimal and she finished her shift.  Woke up the next morning with more significant pain.  She was seen twice by her primary care physician who recommended NSAIDs, muscle relaxer, sling, range of motion exercises and rest.  Patient states that she went to give her work restrictions to her employer and they told her she needs to file for work comp.  Patient states that pain is most on the lateral shoulder extending down the arm to about the elbow.  She denies any numbness or tingling.  She states pain is moderate to severe and constant.  She has difficulty with range of motion of the shoulder.  She has yet to  the Flexeril prescription.  She also states that her primary care ordered x-rays but they did not have an x-ray at that facility and so she has not done them yet.  Patient denies prior left shoulder injuries.  She is currently taking ibuprofen and methadone.   Objective Findings: Left shoulder/upper extremity: No gross deformity.  Moderate tenderness extending from the humeral  head down the humerus to the lateral elbow.,  Tenderness most severe near the shoulder.  Range of motion of the shoulder diminished to less than 120 degrees flexion/abduction.  Weakly positive empty can test.  Speeds test negative.  Global weakness with strength testing in all planes of motion, due to poor effort and pain.    X-ray left shoulder:  X-ray left humerus:   Pre-Existing Condition(s):     Assessment:   Condition Same    Status: Additional Care Required  Permanent Disability:No    Plan:      Diagnostics:      Comments:  X-ray machine malfunctioned in the process of obtaining x-rays, will send patient to another facility for x-rays and will call with results  Given difficulty with range of motion and strength will refer to physical therapy at this time  Continue ibup  rofen, methadone and Flexeril as prescribed  May use OTC creams/ointments/patches as needed  May use heat/ice as needed  Gentle range of motion exercises  Restricted duty  Follow-up 3 weeks    Disability Information   Status: Released to Restricted Duty    From:  8/13/2021  Through: 9/1/2021 Restrictions are: Temporary   Physical Restrictions   Sitting:    Standing:    Stooping:    Bending:      Squatting:    Walking:    Climbing:    Pushing:  < or = to 1 hr/day   Pulling:  < or = to 1 hr/day Other:    Reaching Above Shoulder (L): 0 hrs/day Reaching Above Shoulder (R):       Reaching Below Shoulder (L):    Reaching Below Shoulder (R):      Not to exceed Weight Limits   Carrying(hrs):   Weight Limit(lb):   Lifting(hrs):   Weight  Limit(lb):     Comments: Minimal use of the left arm.  No lift/push/pull greater than 5 pounds.  No lifting above shoulder level.    Repetitive Actions   Hands: i.e. Fine Manipulations from Grasping:     Feet: i.e. Operating Foot Controls:     Driving / Operate Machinery:     Provider Name:   Facundo Hidalgo D.O. Physician Signature:  Physician Name:     Clinic Name / Location: [place] [address]  [address],   [address]  [place] [address] Clinic Phone Number: Dept: [phone]   Appointment Time: 8:30 Am Visit Start Time: 8:35 AM   Check-In Time:  8:03 Am Visit Discharge Time:  9:35 AM   Original-Treating Physician or Chiropractor    Page 2-Insurer/TPA    Page 3-Employer    Page 4-Employee

## 2021-08-13 NOTE — TELEPHONE ENCOUNTER
Phone Number Called: [phone]    Call outcome: Left detailed message for patient. Informed to call back with any additional questions.    Message: Patient missed last page of C4, did not sign. Tried to reach her before she left [address], however she had left via back door and we were unable to. Patient needs to sign last page. Informed patient to call [phone].

## 2021-08-13 NOTE — PROGRESS NOTES
Subjective:     Khalida Delaney is a 61 y.o. female who presents for Shoulder Pain (DOI: 8/9/21  As pt was coming out of the kitchen at work with dinner plates the  was coming in, pt went around her and hit her Lt. lower shoulder on the wall. Now with Lt. shoulder pain radiating to Lt. hand)      DOI 8/9/2021: 60 yo injured worker presents with left shoulder injury. GARRET: She was coming out of the kitchen, moved to go around a coworker and hit her left shoulder on the wall.  Pain was minimal and she finished her shift.  Woke up the next morning with more significant pain.  She was seen twice by her primary care physician who recommended NSAIDs, muscle relaxer, sling, range of motion exercises and rest.  Patient states that she went to give her work restrictions to her employer and they told her she needs to file for work comp.  Patient states that pain is most on the lateral shoulder extending down the arm to about the elbow.  She denies any numbness or tingling.  She states pain is moderate to severe and constant.  She has difficulty with range of motion of the shoulder.  She has yet to  the Flexeril prescription.  She also states that her primary care ordered x-rays but they did not have an x-ray at that facility and so she has not done them yet.  Patient denies prior left shoulder injuries.  She is currently taking ibuprofen and methadone.    ROS: All systems were reviewed on intake form, form was reviewed and signed. See scanned documents in media. Pertinent positives and negatives included in HPI.    PMH: No pertinent past medical history to this problem  MEDS: Medications were reviewed in Epic  ALLERGIES:   Allergies   Allergen Reactions   • Nkda [No Known Drug Allergy]      SOCHX: Works as a  at the [employer]  FH: No pertinent family history to this problem       Objective:     /86 (BP Location: Right arm, Patient Position: Sitting, BP Cuff Size: Adult)   Pulse (!) 54   Temp 36.4 °C  "(97.6 °F) (Temporal)   Resp 16   Ht 1.6 m (5' 3\")   Wt 66.5 kg (146 lb 9.6 oz)   SpO2 97%   BMI 25.97 kg/m²     Constitutional: Patient is in no acute distress. Appears well-developed and well-nourished.   HENT: Normocephalic and atraumatic. EOM are normal. No scleral icterus.   Cardiovascular: Normal rate.    Pulmonary/Chest: Effort normal. No respiratory distress.   Neurological: Patient is alert and oriented to person, place, and time.   Skin: Skin is warm and dry.   Psychiatric: Normal mood and affect. Behavior is normal.     Left shoulder/upper extremity: No gross deformity.  Moderate tenderness extending from the humeral head down the humerus to the lateral elbow.,  Tenderness most severe near the shoulder.  Range of motion of the shoulder diminished to less than 120 degrees flexion/abduction.  Weakly positive empty can test.  Speeds test negative.  Global weakness with strength testing in all planes of motion, due to poor effort and pain.    X-ray left shoulder:  X-ray left humerus:    Assessment/Plan:       1. Strain of left shoulder, initial encounter  - REFERRAL TO PHYSICAL THERAPY    2. Contusion of left upper extremity, subsequent encounter  - REFERRAL TO PHYSICAL THERAPY    Released to Restricted Duty FROM 8/13/2021 TO 9/1/2021  Minimal use of the left arm.  No lift/push/pull greater than 5 pounds.  No lifting above shoulder level.  X-ray machine malfunctioned in the process of obtaining x-rays, will send patient to another facility for x-rays and will call with results  Given difficulty with range of motion and strength will refer to physical therapy at this time  Continue ibup  rofen, methadone and Flexeril as prescribed  May use OTC creams/ointments/patches as needed  May use heat/ice as needed  Gentle range of motion exercises  Restricted duty  Follow-up 3 weeks    Differential diagnosis, natural history, supportive care, and indications for immediate follow-up discussed.    Approximately 45 " minutes were spent in reviewing notes, preparing for visit, obtaining history, exam and evaluation, patient counseling/education and post visit documentation/orders.

## 2021-08-13 NOTE — LETTER
"EMPLOYEE’S CLAIM FOR COMPENSATION/ REPORT OF INITIAL TREATMENT  FORM C-4    EMPLOYEE’S CLAIM - PROVIDE ALL INFORMATION REQUESTED   First Name  Khalida Last Name  Rhett Birthdate                    1960                Sex  female Claim Number   Home Address  [address] Age  61 y.o. Height  1.6 m (5' 3\") Weight  66.5 kg (146 lb 9.6 oz) [facility]     [facility] Zip  [address] Telephone  [phone] (home) [phone] (work)   Mailing Address  [address] [facility] Zip  [address] Primary Language Spoken  English    Insurer   Third Party   [employer]   Employee's Occupation (Job Title) When Injury or Occupational Disease Occurred      Employer's Name  [employer]  Telephone  [phone]    Employer Address  [address] [place] [address]  Zip  [address]   Date of Injury  8/9/2021               Hour of Injury  4:30 PM Date Employer Notified  8/12/2021 Last Day of Work after Injury     or Occupational Disease   Supervisor to Whom Injury     Reported  [employer]   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 (Be specific an answer in detail. Use additional sheet if necessary)  COMING OUT OF KITCHEN WITH DINNER PLATES.  WAS COMING IN. I WENT AROUND HER & HIT MY LEFT LOWER SHOULDER ON THE WALL.   If you believe that you have an occupational disease, when did you first have knowledge of the disability and it relationship to your employment?  N/A Witnesses to the Accident  POPEYE [place]ORGA      Nature of Injury or Occupational Disease  Strain  Part(s) of Body Injured or Affected  Shoulder (L), Upper Arm (L), Lower Arm (L)    I certify that the above is true and correct to the best of my knowledge and that I have provided this information in " order to obtain the benefits of [place]  I hereby authorize any physician, chiropractor, surgeon, practitioner, or other person, any hospital, including [facility] or [facility] hospital, any medical service organization, any insurance company, or other institution or organization to release to each other, any medical or other information, including benefits paid or payable, pertinent to this injury or disease, except information relative to diagnosis, treatment and/or counseling for AIDS, psychological conditions, alcohol or controlled substances, for which I must give specific authorization.  A Photostat of this authorization shall be as valid as the original.     Date   Place   Employee’s Signature   THIS REPORT MUST BE COMPLETED AND MAILED WITHIN 3 WORKING DAYS OF TREATMENT   [place]  [facility]  [facility]   Date  8/13/2021 Diagnosis  (S46.912A) Strain of left shoulder, initial encounter  (S40.022D) Contusion of left upper extremity, subsequent encounter Is there evidence the injured employee was under the              influence of alcohol and/or another controlled substance at the time of accident?   Hour  8:35 AM Description of Injury or Disease  Diagnoses of Strain of left shoulder, initial encounter and Contusion of left upper extremity, subsequent encounter were pertinent to this visit. No   Treatment  Physical therapy  Ibuprofen, Flexeril  Have you advised the patient to remain off work five days or     more? No   X-Ray Findings  Negative   If Yes   From Date  To Date      From information given by the employee, together with medical evidence, can you directly connect this injury or occupational disease as job incurred?  Yes If No Full Duty    No Modified Duty  Yes   Is additional medical care by a physician indicated?  Yes If Modified Duty, Specify  "any Limitations / Restrictions  Minimal use of the left arm   Do you know of any previous injury or disease contributing to this condition or occupational disease?                            No   Date  8/13/2021 Print Doctor’s Name   Facundo Hidalgo D.O. I certify the employer’s copy of  this form was mailed on:   Address  [address],   [address] Insurer’s Use Only     [facility] Zip  [address]    Provider’s Tax ID Number  [id number] Telephone  Dept: [phone]      e-[email or web address] D.O.  Signature:     Degree          ORIGINAL-TREATING PHYSICIAN OR CHIROPRACTOR    PAGE 2-INSURER/TPA    PAGE 3-EMPLOYER    PAGE 4-EMPLOYEE        Form C-4 (rev.10/07)           BRIEF DESCRIPTION OF RIGHTS AND BENEFITS  (Pursuant to [place] 616C.050)    Notice of Injury or Occupational Disease (Incident Report Form C-1): If an injury or occupational disease (OD) arises out of and in the course of employment, you must provide written notice to your employer as soon as practicable, but no later than 7 days after the accident or OD. Your employer shall maintain a sufficient supply of the required forms.    Claim for Compensation (Form C-4): If medical treatment is sought, the form C-4 is available at the place of initial treatment. A completed \"Claim for Compensation\" (Form C-4) must be filed within 90 days after an accident or OD. The treating physician or chiropractor must, within 3 working days after treatment, complete and mail to the employer, the employer's insurer and third-party , the Claim for Compensation.    Medical Treatment: If you require medical treatment for your on-the-job injury or OD, you may be required to select a physician or chiropractor from a list provided by your workers’ compensation insurer, if it has contracted with an Organization for Managed Care (MCO) or Preferred Provider Organization (PPO) or providers of health care. If your employer has not entered into a contract with an MCO or " PPO, you may select a physician or chiropractor from the Panel of Physicians and Chiropractors. Any medical costs related to your industrial injury or OD will be paid by your insurer.    Temporary Total Disability (TTD): If your doctor has certified that you are unable to work for a period of at least 5 consecutive days, or 5 cumulative days in a 20-day period, or places restrictions on you that your employer does not accommodate, you may be entitled to TTD compensation.    Temporary Partial Disability (TPD): If the wage you receive upon reemployment is less than the compensation for TTD to which you are entitled, the insurer may be required to pay you TPD compensation to make up the difference. TPD can only be paid for a maximum of 24 months.    Permanent Partial Disability (PPD): When your medical condition is stable and there is an indication of a PPD as a result of your injury or OD, within 30 days, your insurer must arrange for an evaluation by a rating physician or chiropractor to determine the degree of your PPD. The amount of your PPD award depends on the date of injury, the results of the PPD evaluation, your age and wage.    Permanent Total Disability (PTD): If you are medically certified by a treating physician or chiropractor as permanently and totally disabled and have been granted a PTD status by your insurer, you are entitled to receive monthly benefits not to exceed 66 2/3% of your average monthly wage. The amount of your PTD payments is subject to reduction if you previously received a lump-sum PPD award.    Vocational Rehabilitation Services: You may be eligible for vocational rehabilitation services if you are unable to return to the job due to a permanent physical impairment or permanent restrictions as a result of your injury or occupational disease.    Transportation and Per Chasity Reimbursement: You may be eligible for travel expenses and per chasity associated with medical treatment.    Reopening:  You may be able to reopen your claim if your condition worsens after claim closure.     Appeal Process: If you disagree with a written determination issued by the insurer or the insurer does not respond to your request, you may appeal to the Department of Administration, , by following the instructions contained in your determination letter. You must appeal the determination within 70 days from the date of the determination letter at [address], [address], [place], [place] [address], or [address], [address], [place], [place] [address]. If you disagree with the  decision, you may appeal to the Department of Administration, . You must file your appeal within 30 days from the date of the  decision letter at [address], [address], [place], [place] [address], or [address], [address], [place], [place] [address]. If you disagree with a decision of an , you may file a petition for judicial review with the District Court. You must do so within 30 days of the Appeal Officer’s decision. You may be represented by an  at your own expense or you may contact the [facility] for possible representation.    [place]  for Injured Workers (NAIW): If you disagree with a  decision, you may request that NAIW represent you without charge at an  Hearing. For information regarding denial of benefits, you may contact the [facility] at: [address], [address], [place] [address], [phone], or [address], [address], [place] [address], [phone]    To File a Complaint with the Division: If you wish to file a complaint with the  of the Division of Industrial Relations (DIR),  please contact the Workers’ Compensation Section, [address], [address], [place], [place] [address], telephone ([phone]) [phone], or [address] [address], [facility]  [place], [place] [address], telephone [phone].    For assistance with Workers’ Compensation Issues: You may contact the [facility] Office for Consumer Health Assistance, [address] [place] [address], [address], [place], [place] [address], Toll Free 1-314.958.5679, Web site: http://[facility].[place].gov/Programs/ARA E-mail: ara@[facility].nv.[facility]              __________________________________________________________________                                    _________________            Employee Name / Signature                                                                                                                            Date                                                                                                                                                                                                                              D-2 (rev. 10/20)

## 2021-08-13 NOTE — PROGRESS NOTES
Khalida Delaney is a 61 y.o. female here for a non-provider visit for Post accident drug screening.    If abnormal was an in office provider notified today (if so, indicate provider)? Yes    Routed to PCP? No

## 2021-08-17 ENCOUNTER — APPOINTMENT (OUTPATIENT)
Dept: RADIOLOGY | Facility: MEDICAL CENTER | Age: 61
DRG: 071 | End: 2021-08-17
Attending: EMERGENCY MEDICINE
Payer: MEDICARE

## 2021-08-17 ENCOUNTER — HOSPITAL ENCOUNTER (INPATIENT)
Facility: MEDICAL CENTER | Age: 61
LOS: 3 days | DRG: 071 | End: 2021-08-20
Attending: EMERGENCY MEDICINE | Admitting: STUDENT IN AN ORGANIZED HEALTH CARE EDUCATION/TRAINING PROGRAM
Payer: MEDICARE

## 2021-08-17 DIAGNOSIS — R79.89 ELEVATED TROPONIN: ICD-10-CM

## 2021-08-17 DIAGNOSIS — G89.29 CHRONIC LOW BACK PAIN, UNSPECIFIED BACK PAIN LATERALITY, UNSPECIFIED WHETHER SCIATICA PRESENT: ICD-10-CM

## 2021-08-17 DIAGNOSIS — M54.2 CHRONIC NECK PAIN: ICD-10-CM

## 2021-08-17 DIAGNOSIS — G89.29 CHRONIC NECK PAIN: ICD-10-CM

## 2021-08-17 DIAGNOSIS — F11.20 OPIOID TYPE DEPENDENCE, CONTINUOUS (HCC): ICD-10-CM

## 2021-08-17 DIAGNOSIS — M54.50 CHRONIC LOW BACK PAIN, UNSPECIFIED BACK PAIN LATERALITY, UNSPECIFIED WHETHER SCIATICA PRESENT: ICD-10-CM

## 2021-08-17 DIAGNOSIS — E87.20 LACTIC ACIDOSIS: ICD-10-CM

## 2021-08-17 DIAGNOSIS — Z79.899 POLYPHARMACY: ICD-10-CM

## 2021-08-17 DIAGNOSIS — R53.1 GENERALIZED WEAKNESS: ICD-10-CM

## 2021-08-17 DIAGNOSIS — E86.0 DEHYDRATION: ICD-10-CM

## 2021-08-17 DIAGNOSIS — G93.40 ACUTE ENCEPHALOPATHY: ICD-10-CM

## 2021-08-17 PROBLEM — F32.A ANXIETY AND DEPRESSION: Status: ACTIVE | Noted: 2021-08-17

## 2021-08-17 PROBLEM — E87.6 HYPOKALEMIA: Status: ACTIVE | Noted: 2021-08-17

## 2021-08-17 PROBLEM — D72.829 LEUKOCYTOSIS: Status: ACTIVE | Noted: 2021-08-17

## 2021-08-17 PROBLEM — F41.9 ANXIETY AND DEPRESSION: Status: ACTIVE | Noted: 2021-08-17

## 2021-08-17 PROBLEM — A41.9 SEPSIS (HCC): Status: ACTIVE | Noted: 2021-08-17

## 2021-08-17 PROBLEM — G93.41 ACUTE METABOLIC ENCEPHALOPATHY: Status: ACTIVE | Noted: 2021-08-17

## 2021-08-17 LAB
ALBUMIN SERPL BCP-MCNC: 3.5 G/DL (ref 3.2–4.9)
ALBUMIN SERPL BCP-MCNC: 4 G/DL (ref 3.2–4.9)
ALBUMIN/GLOB SERPL: 1.3 G/DL
ALBUMIN/GLOB SERPL: 1.3 G/DL
ALP SERPL-CCNC: 64 U/L (ref 30–99)
ALP SERPL-CCNC: 75 U/L (ref 30–99)
ALT SERPL-CCNC: 20 U/L (ref 2–50)
ALT SERPL-CCNC: 24 U/L (ref 2–50)
AMPHET UR QL SCN: NEGATIVE
ANION GAP SERPL CALC-SCNC: 15 MMOL/L (ref 7–16)
ANION GAP SERPL CALC-SCNC: 21 MMOL/L (ref 7–16)
APAP SERPL-MCNC: <5 UG/ML (ref 10–30)
APPEARANCE UR: CLEAR
APTT PPP: 25.3 SEC (ref 24.7–36)
AST SERPL-CCNC: 31 U/L (ref 12–45)
AST SERPL-CCNC: 35 U/L (ref 12–45)
BARBITURATES UR QL SCN: NEGATIVE
BASOPHILS # BLD AUTO: 0.3 % (ref 0–1.8)
BASOPHILS # BLD: 0.04 K/UL (ref 0–0.12)
BENZODIAZ UR QL SCN: POSITIVE
BILIRUB SERPL-MCNC: 0.7 MG/DL (ref 0.1–1.5)
BILIRUB SERPL-MCNC: 0.8 MG/DL (ref 0.1–1.5)
BILIRUB UR QL STRIP.AUTO: NEGATIVE
BUN SERPL-MCNC: 14 MG/DL (ref 8–22)
BUN SERPL-MCNC: 16 MG/DL (ref 8–22)
BURR CELLS/RBC NFR CSF MANUAL: 0 %
BZE UR QL SCN: NEGATIVE
CALCIUM SERPL-MCNC: 9.1 MG/DL (ref 8.5–10.5)
CALCIUM SERPL-MCNC: 9.9 MG/DL (ref 8.5–10.5)
CANNABINOIDS UR QL SCN: NEGATIVE
CHLORIDE SERPL-SCNC: 105 MMOL/L (ref 96–112)
CHLORIDE SERPL-SCNC: 106 MMOL/L (ref 96–112)
CK SERPL-CCNC: 188 U/L (ref 0–154)
CLARITY CSF: CLEAR
CO2 SERPL-SCNC: 15 MMOL/L (ref 20–33)
CO2 SERPL-SCNC: 20 MMOL/L (ref 20–33)
COLOR CSF: COLORLESS
COLOR SPUN CSF: COLORLESS
COLOR UR: ABNORMAL
CREAT SERPL-MCNC: 0.75 MG/DL (ref 0.5–1.4)
CREAT SERPL-MCNC: 0.96 MG/DL (ref 0.5–1.4)
CRP SERPL HS-MCNC: 0.31 MG/DL (ref 0–0.75)
EKG IMPRESSION: NORMAL
EOSINOPHIL # BLD AUTO: 0 K/UL (ref 0–0.51)
EOSINOPHIL NFR BLD: 0 % (ref 0–6.9)
ERYTHROCYTE [DISTWIDTH] IN BLOOD BY AUTOMATED COUNT: 40.5 FL (ref 35.9–50)
ETHANOL BLD-MCNC: <10.1 MG/DL (ref 0–10)
FLUAV RNA SPEC QL NAA+PROBE: NEGATIVE
FLUBV RNA SPEC QL NAA+PROBE: NEGATIVE
GLOBULIN SER CALC-MCNC: 2.7 G/DL (ref 1.9–3.5)
GLOBULIN SER CALC-MCNC: 3.1 G/DL (ref 1.9–3.5)
GLUCOSE CSF-MCNC: 88 MG/DL (ref 40–80)
GLUCOSE SERPL-MCNC: 132 MG/DL (ref 65–99)
GLUCOSE SERPL-MCNC: 211 MG/DL (ref 65–99)
GLUCOSE UR STRIP.AUTO-MCNC: NEGATIVE MG/DL
GRAM STN SPEC: NORMAL
HCT VFR BLD AUTO: 41.9 % (ref 37–47)
HGB BLD-MCNC: 15.4 G/DL (ref 12–16)
IMM GRANULOCYTES # BLD AUTO: 0.12 K/UL (ref 0–0.11)
IMM GRANULOCYTES NFR BLD AUTO: 0.8 % (ref 0–0.9)
INR PPP: 1.07 (ref 0.87–1.13)
KETONES UR STRIP.AUTO-MCNC: 15 MG/DL
LACTATE BLD-SCNC: 1.2 MMOL/L (ref 0.5–2)
LACTATE BLD-SCNC: 1.8 MMOL/L (ref 0.5–2)
LACTATE BLD-SCNC: 3.9 MMOL/L (ref 0.5–2)
LEUKOCYTE ESTERASE UR QL STRIP.AUTO: NEGATIVE
LYMPHOCYTES # BLD AUTO: 0.65 K/UL (ref 1–4.8)
LYMPHOCYTES NFR BLD: 4.1 % (ref 22–41)
LYMPHOCYTES NFR CSF: 81 %
MAGNESIUM SERPL-MCNC: 1.9 MG/DL (ref 1.5–2.5)
MCH RBC QN AUTO: 32.2 PG (ref 27–33)
MCHC RBC AUTO-ENTMCNC: 36.8 G/DL (ref 33.6–35)
MCV RBC AUTO: 87.5 FL (ref 81.4–97.8)
METHADONE UR QL SCN: POSITIVE
MICRO URNS: ABNORMAL
MONOCYTES # BLD AUTO: 0.41 K/UL (ref 0–0.85)
MONOCYTES NFR BLD AUTO: 2.6 % (ref 0–13.4)
MONONUC CELLS NFR CSF: 10 %
NEUTROPHILS # BLD AUTO: 14.53 K/UL (ref 2–7.15)
NEUTROPHILS NFR BLD: 92.2 % (ref 44–72)
NEUTROPHILS NFR CSF: 9 %
NITRITE UR QL STRIP.AUTO: NEGATIVE
NRBC # BLD AUTO: 0 K/UL
NRBC BLD-RTO: 0 /100 WBC
OPIATES UR QL SCN: NEGATIVE
OXYCODONE UR QL SCN: NEGATIVE
PCP UR QL SCN: POSITIVE
PH UR STRIP.AUTO: 5.5 [PH] (ref 5–8)
PLATELET # BLD AUTO: 247 K/UL (ref 164–446)
PMV BLD AUTO: 11.2 FL (ref 9–12.9)
POTASSIUM SERPL-SCNC: 3.4 MMOL/L (ref 3.6–5.5)
POTASSIUM SERPL-SCNC: 3.5 MMOL/L (ref 3.6–5.5)
PROCALCITONIN SERPL-MCNC: <0.05 NG/ML
PROPOXYPH UR QL SCN: NEGATIVE
PROT CSF-MCNC: 27 MG/DL (ref 15–45)
PROT SERPL-MCNC: 6.2 G/DL (ref 6–8.2)
PROT SERPL-MCNC: 7.1 G/DL (ref 6–8.2)
PROT UR QL STRIP: NEGATIVE MG/DL
PROTHROMBIN TIME: 13.6 SEC (ref 12–14.6)
RBC # BLD AUTO: 4.79 M/UL (ref 4.2–5.4)
RBC # CSF: 1 CELLS/UL
RBC UR QL AUTO: NEGATIVE
RSV RNA SPEC QL NAA+PROBE: NEGATIVE
SALICYLATES SERPL-MCNC: <1 MG/DL (ref 15–25)
SARS-COV-2 RNA RESP QL NAA+PROBE: NOTDETECTED
SIGNIFICANT IND 70042: NORMAL
SITE SITE: NORMAL
SODIUM SERPL-SCNC: 141 MMOL/L (ref 135–145)
SODIUM SERPL-SCNC: 141 MMOL/L (ref 135–145)
SOURCE SOURCE: NORMAL
SP GR UR STRIP.AUTO: 1.02
SPECIMEN SOURCE: NORMAL
SPECIMEN VOL CSF: 5 ML
TROPONIN T SERPL-MCNC: 37 NG/L (ref 6–19)
TROPONIN T SERPL-MCNC: 72 NG/L (ref 6–19)
TUBE # CSF: 4
TUBE # CSF: 4
UROBILINOGEN UR STRIP.AUTO-MCNC: 1 MG/DL
WBC # BLD AUTO: 15.8 K/UL (ref 4.8–10.8)
WBC # CSF: 2 CELLS/UL (ref 0–10)

## 2021-08-17 PROCEDURE — 36415 COLL VENOUS BLD VENIPUNCTURE: CPT

## 2021-08-17 PROCEDURE — 84145 PROCALCITONIN (PCT): CPT

## 2021-08-17 PROCEDURE — 89051 BODY FLUID CELL COUNT: CPT

## 2021-08-17 PROCEDURE — 84484 ASSAY OF TROPONIN QUANT: CPT

## 2021-08-17 PROCEDURE — 83735 ASSAY OF MAGNESIUM: CPT

## 2021-08-17 PROCEDURE — 700101 HCHG RX REV CODE 250

## 2021-08-17 PROCEDURE — 87086 URINE CULTURE/COLONY COUNT: CPT

## 2021-08-17 PROCEDURE — 99223 1ST HOSP IP/OBS HIGH 75: CPT | Mod: AI | Performed by: STUDENT IN AN ORGANIZED HEALTH CARE EDUCATION/TRAINING PROGRAM

## 2021-08-17 PROCEDURE — 80307 DRUG TEST PRSMV CHEM ANLYZR: CPT

## 2021-08-17 PROCEDURE — 009U3ZX DRAINAGE OF SPINAL CANAL, PERCUTANEOUS APPROACH, DIAGNOSTIC: ICD-10-PCS | Performed by: EMERGENCY MEDICINE

## 2021-08-17 PROCEDURE — 62270 DX LMBR SPI PNXR: CPT

## 2021-08-17 PROCEDURE — 84157 ASSAY OF PROTEIN OTHER: CPT

## 2021-08-17 PROCEDURE — 82077 ASSAY SPEC XCP UR&BREATH IA: CPT

## 2021-08-17 PROCEDURE — 87070 CULTURE OTHR SPECIMN AEROBIC: CPT

## 2021-08-17 PROCEDURE — C9803 HOPD COVID-19 SPEC COLLECT: HCPCS | Performed by: EMERGENCY MEDICINE

## 2021-08-17 PROCEDURE — 700111 HCHG RX REV CODE 636 W/ 250 OVERRIDE (IP): Performed by: STUDENT IN AN ORGANIZED HEALTH CARE EDUCATION/TRAINING PROGRAM

## 2021-08-17 PROCEDURE — 80179 DRUG ASSAY SALICYLATE: CPT

## 2021-08-17 PROCEDURE — 80053 COMPREHEN METABOLIC PANEL: CPT | Mod: 91

## 2021-08-17 PROCEDURE — 99285 EMERGENCY DEPT VISIT HI MDM: CPT

## 2021-08-17 PROCEDURE — 71045 X-RAY EXAM CHEST 1 VIEW: CPT

## 2021-08-17 PROCEDURE — 700102 HCHG RX REV CODE 250 W/ 637 OVERRIDE(OP): Performed by: STUDENT IN AN ORGANIZED HEALTH CARE EDUCATION/TRAINING PROGRAM

## 2021-08-17 PROCEDURE — 87205 SMEAR GRAM STAIN: CPT

## 2021-08-17 PROCEDURE — 81003 URINALYSIS AUTO W/O SCOPE: CPT

## 2021-08-17 PROCEDURE — 85025 COMPLETE CBC W/AUTO DIFF WBC: CPT

## 2021-08-17 PROCEDURE — 96375 TX/PRO/DX INJ NEW DRUG ADDON: CPT

## 2021-08-17 PROCEDURE — 82945 GLUCOSE OTHER FLUID: CPT

## 2021-08-17 PROCEDURE — A9270 NON-COVERED ITEM OR SERVICE: HCPCS | Performed by: STUDENT IN AN ORGANIZED HEALTH CARE EDUCATION/TRAINING PROGRAM

## 2021-08-17 PROCEDURE — 770004 HCHG ROOM/CARE - ONCOLOGY PRIVATE *

## 2021-08-17 PROCEDURE — 700101 HCHG RX REV CODE 250: Performed by: STUDENT IN AN ORGANIZED HEALTH CARE EDUCATION/TRAINING PROGRAM

## 2021-08-17 PROCEDURE — 93005 ELECTROCARDIOGRAM TRACING: CPT | Performed by: EMERGENCY MEDICINE

## 2021-08-17 PROCEDURE — 87040 BLOOD CULTURE FOR BACTERIA: CPT

## 2021-08-17 PROCEDURE — 70498 CT ANGIOGRAPHY NECK: CPT | Mod: MG

## 2021-08-17 PROCEDURE — 70496 CT ANGIOGRAPHY HEAD: CPT | Mod: MG

## 2021-08-17 PROCEDURE — 96365 THER/PROPH/DIAG IV INF INIT: CPT

## 2021-08-17 PROCEDURE — 70450 CT HEAD/BRAIN W/O DYE: CPT | Mod: ME

## 2021-08-17 PROCEDURE — 72125 CT NECK SPINE W/O DYE: CPT | Mod: ME

## 2021-08-17 PROCEDURE — 0042T CT-CEREBRAL PERFUSION ANALYSIS: CPT

## 2021-08-17 PROCEDURE — 85730 THROMBOPLASTIN TIME PARTIAL: CPT

## 2021-08-17 PROCEDURE — 700117 HCHG RX CONTRAST REV CODE 255: Performed by: EMERGENCY MEDICINE

## 2021-08-17 PROCEDURE — 86140 C-REACTIVE PROTEIN: CPT

## 2021-08-17 PROCEDURE — 82550 ASSAY OF CK (CPK): CPT

## 2021-08-17 PROCEDURE — 87150 DNA/RNA AMPLIFIED PROBE: CPT

## 2021-08-17 PROCEDURE — 700105 HCHG RX REV CODE 258: Performed by: EMERGENCY MEDICINE

## 2021-08-17 PROCEDURE — 0241U HCHG SARS-COV-2 COVID-19 NFCT DS RESP RNA 4 TRGT MIC: CPT

## 2021-08-17 PROCEDURE — 83605 ASSAY OF LACTIC ACID: CPT

## 2021-08-17 PROCEDURE — 80143 DRUG ASSAY ACETAMINOPHEN: CPT

## 2021-08-17 PROCEDURE — 87077 CULTURE AEROBIC IDENTIFY: CPT

## 2021-08-17 PROCEDURE — 700111 HCHG RX REV CODE 636 W/ 250 OVERRIDE (IP): Performed by: EMERGENCY MEDICINE

## 2021-08-17 PROCEDURE — 85610 PROTHROMBIN TIME: CPT

## 2021-08-17 RX ORDER — ONDANSETRON 4 MG/1
4 TABLET, ORALLY DISINTEGRATING ORAL EVERY 4 HOURS PRN
Status: DISCONTINUED | OUTPATIENT
Start: 2021-08-17 | End: 2021-08-20 | Stop reason: HOSPADM

## 2021-08-17 RX ORDER — IBUPROFEN 800 MG/1
800 TABLET ORAL EVERY 6 HOURS PRN
Status: DISCONTINUED | OUTPATIENT
Start: 2021-08-17 | End: 2021-08-20 | Stop reason: HOSPADM

## 2021-08-17 RX ORDER — CLONIDINE HYDROCHLORIDE 0.1 MG/1
0.1 TABLET ORAL EVERY 6 HOURS PRN
Status: DISCONTINUED | OUTPATIENT
Start: 2021-08-17 | End: 2021-08-20 | Stop reason: HOSPADM

## 2021-08-17 RX ORDER — ONDANSETRON 2 MG/ML
4 INJECTION INTRAMUSCULAR; INTRAVENOUS EVERY 4 HOURS PRN
Status: DISCONTINUED | OUTPATIENT
Start: 2021-08-17 | End: 2021-08-20 | Stop reason: HOSPADM

## 2021-08-17 RX ORDER — LAMOTRIGINE 100 MG/1
300 TABLET ORAL
Status: DISCONTINUED | OUTPATIENT
Start: 2021-08-17 | End: 2021-08-20 | Stop reason: HOSPADM

## 2021-08-17 RX ORDER — POLYETHYLENE GLYCOL 3350 17 G/17G
1 POWDER, FOR SOLUTION ORAL
Status: DISCONTINUED | OUTPATIENT
Start: 2021-08-17 | End: 2021-08-20 | Stop reason: HOSPADM

## 2021-08-17 RX ORDER — BUSPIRONE HYDROCHLORIDE 10 MG/1
30 TABLET ORAL 2 TIMES DAILY
Status: DISCONTINUED | OUTPATIENT
Start: 2021-08-17 | End: 2021-08-20 | Stop reason: HOSPADM

## 2021-08-17 RX ORDER — LABETALOL HYDROCHLORIDE 5 MG/ML
10 INJECTION, SOLUTION INTRAVENOUS EVERY 4 HOURS PRN
Status: DISCONTINUED | OUTPATIENT
Start: 2021-08-17 | End: 2021-08-20 | Stop reason: HOSPADM

## 2021-08-17 RX ORDER — SODIUM CHLORIDE, SODIUM LACTATE, POTASSIUM CHLORIDE, CALCIUM CHLORIDE 600; 310; 30; 20 MG/100ML; MG/100ML; MG/100ML; MG/100ML
1000 INJECTION, SOLUTION INTRAVENOUS ONCE
Status: COMPLETED | OUTPATIENT
Start: 2021-08-17 | End: 2021-08-17

## 2021-08-17 RX ORDER — BISACODYL 10 MG
10 SUPPOSITORY, RECTAL RECTAL
Status: DISCONTINUED | OUTPATIENT
Start: 2021-08-17 | End: 2021-08-20 | Stop reason: HOSPADM

## 2021-08-17 RX ORDER — SODIUM CHLORIDE, SODIUM LACTATE, POTASSIUM CHLORIDE, CALCIUM CHLORIDE 600; 310; 30; 20 MG/100ML; MG/100ML; MG/100ML; MG/100ML
1000 INJECTION, SOLUTION INTRAVENOUS ONCE
Status: ACTIVE | OUTPATIENT
Start: 2021-08-17 | End: 2021-08-18

## 2021-08-17 RX ORDER — PROMETHAZINE HYDROCHLORIDE 25 MG/1
12.5-25 SUPPOSITORY RECTAL EVERY 4 HOURS PRN
Status: DISCONTINUED | OUTPATIENT
Start: 2021-08-17 | End: 2021-08-20 | Stop reason: HOSPADM

## 2021-08-17 RX ORDER — AMOXICILLIN 250 MG
2 CAPSULE ORAL 2 TIMES DAILY
Status: DISCONTINUED | OUTPATIENT
Start: 2021-08-18 | End: 2021-08-20 | Stop reason: HOSPADM

## 2021-08-17 RX ORDER — PROMETHAZINE HYDROCHLORIDE 25 MG/1
12.5-25 TABLET ORAL EVERY 4 HOURS PRN
Status: DISCONTINUED | OUTPATIENT
Start: 2021-08-17 | End: 2021-08-20 | Stop reason: HOSPADM

## 2021-08-17 RX ORDER — MEMANTINE HYDROCHLORIDE 10 MG/1
10 TABLET ORAL 2 TIMES DAILY
Status: DISCONTINUED | OUTPATIENT
Start: 2021-08-17 | End: 2021-08-20 | Stop reason: HOSPADM

## 2021-08-17 RX ORDER — ACETAMINOPHEN 325 MG/1
650 TABLET ORAL EVERY 6 HOURS PRN
Status: DISCONTINUED | OUTPATIENT
Start: 2021-08-17 | End: 2021-08-20 | Stop reason: HOSPADM

## 2021-08-17 RX ORDER — ENALAPRILAT 1.25 MG/ML
1.25 INJECTION INTRAVENOUS EVERY 6 HOURS PRN
Status: DISCONTINUED | OUTPATIENT
Start: 2021-08-17 | End: 2021-08-20 | Stop reason: HOSPADM

## 2021-08-17 RX ORDER — ZIPRASIDONE HYDROCHLORIDE 80 MG/1
160 CAPSULE ORAL
Status: DISCONTINUED | OUTPATIENT
Start: 2021-08-17 | End: 2021-08-20 | Stop reason: HOSPADM

## 2021-08-17 RX ORDER — LIDOCAINE HYDROCHLORIDE AND EPINEPHRINE BITARTRATE 20; .01 MG/ML; MG/ML
10 INJECTION, SOLUTION SUBCUTANEOUS ONCE
Status: COMPLETED | OUTPATIENT
Start: 2021-08-17 | End: 2021-08-17

## 2021-08-17 RX ORDER — SODIUM CHLORIDE AND POTASSIUM CHLORIDE 150; 900 MG/100ML; MG/100ML
INJECTION, SOLUTION INTRAVENOUS CONTINUOUS
Status: DISCONTINUED | OUTPATIENT
Start: 2021-08-17 | End: 2021-08-20

## 2021-08-17 RX ORDER — MEMANTINE HYDROCHLORIDE 10 MG/1
10 TABLET ORAL 2 TIMES DAILY
COMMUNITY
End: 2021-09-23

## 2021-08-17 RX ORDER — NICOTINE 21 MG/24HR
21 PATCH, TRANSDERMAL 24 HOURS TRANSDERMAL
Status: DISCONTINUED | OUTPATIENT
Start: 2021-08-17 | End: 2021-08-20 | Stop reason: HOSPADM

## 2021-08-17 RX ORDER — PROCHLORPERAZINE EDISYLATE 5 MG/ML
5-10 INJECTION INTRAMUSCULAR; INTRAVENOUS EVERY 4 HOURS PRN
Status: DISCONTINUED | OUTPATIENT
Start: 2021-08-17 | End: 2021-08-20 | Stop reason: HOSPADM

## 2021-08-17 RX ADMIN — IBUPROFEN 800 MG: 800 TABLET, FILM COATED ORAL at 23:13

## 2021-08-17 RX ADMIN — LIDOCAINE HYDROCHLORIDE AND EPINEPHRINE 10 ML: 20; 10 INJECTION, SOLUTION INFILTRATION; PERINEURAL at 16:00

## 2021-08-17 RX ADMIN — ENOXAPARIN SODIUM 40 MG: 40 INJECTION SUBCUTANEOUS at 21:36

## 2021-08-17 RX ADMIN — ONDANSETRON 4 MG: 2 INJECTION INTRAMUSCULAR; INTRAVENOUS at 17:04

## 2021-08-17 RX ADMIN — POTASSIUM CHLORIDE AND SODIUM CHLORIDE: 900; 150 INJECTION, SOLUTION INTRAVENOUS at 17:57

## 2021-08-17 RX ADMIN — NICOTINE TRANSDERMAL SYSTEM 21 MG: 21 PATCH, EXTENDED RELEASE TRANSDERMAL at 17:56

## 2021-08-17 RX ADMIN — LAMOTRIGINE 300 MG: 100 TABLET ORAL at 21:33

## 2021-08-17 RX ADMIN — IOHEXOL 80 ML: 350 INJECTION, SOLUTION INTRAVENOUS at 15:08

## 2021-08-17 RX ADMIN — SODIUM CHLORIDE, POTASSIUM CHLORIDE, SODIUM LACTATE AND CALCIUM CHLORIDE 1000 ML: 600; 310; 30; 20 INJECTION, SOLUTION INTRAVENOUS at 15:44

## 2021-08-17 RX ADMIN — CEFTRIAXONE SODIUM 2 G: 2 INJECTION, POWDER, FOR SOLUTION INTRAMUSCULAR; INTRAVENOUS at 16:15

## 2021-08-17 RX ADMIN — IOHEXOL 40 ML: 350 INJECTION, SOLUTION INTRAVENOUS at 15:06

## 2021-08-17 RX ADMIN — SODIUM CHLORIDE, POTASSIUM CHLORIDE, SODIUM LACTATE AND CALCIUM CHLORIDE 1000 ML: 600; 310; 30; 20 INJECTION, SOLUTION INTRAVENOUS at 14:17

## 2021-08-17 RX ADMIN — ZIPRASIDONE HYDROCHLORIDE 160 MG: 80 CAPSULE ORAL at 21:34

## 2021-08-17 ASSESSMENT — ENCOUNTER SYMPTOMS
NAUSEA: 1
VOMITING: 1
DIAPHORESIS: 1
WEAKNESS: 1
CARDIOVASCULAR NEGATIVE: 1
NECK PAIN: 1
RESPIRATORY NEGATIVE: 1

## 2021-08-17 ASSESSMENT — PAIN DESCRIPTION - PAIN TYPE: TYPE: ACUTE PAIN

## 2021-08-17 ASSESSMENT — LIFESTYLE VARIABLES: DO YOU DRINK ALCOHOL: NO

## 2021-08-17 NOTE — ED PROVIDER NOTES
ED Provider Note    CHIEF COMPLAINT  Chief Complaint   Patient presents with   • ALOC     Pt BIB EMS from home where she lives with her . Pt was last seen well last night before going to bed. Pt's  came home from work this afternoon and found pt prone on the ground and altered. Pt has a new prescription for Cyclobenzabrine with 6 out of 30 remaining. If taken appropriately there should be 18 remaining. Pt currently verbal and following commands but confused.        HPI    Primary care provider: MARTHA Clark  Means of arrival: EMS  History obtained from: Medic report, records review  History limited by: Patient very altered    Khalida Delaney is a 61 y.o. female who presents with altered mental status. Patient was normal last night,  leaves for work very early in the morning and returned home this afternoon and found his wife prone on the floor. She was incredibly altered and minimally responsive so he called 911. Medics found the patient to have mild hyperglycemia, tachycardia, but she would intermittently follow commands but appeared very confused. Apparently she has a history of chronic pain, was recently seen at this facility and prescribed muscle relaxers. She has a pill bottle of 30 tablets and only 6 remain.    Further history limited due to the patient's acute altered mental status.    REVIEW OF SYSTEMS  Constitutional: Positive for altered mental status.  Skin: Positive for bruising to face and both knees.  Neurological: Positive for altered mental status.    Further ROS limited due to patient's clinical condition she is very altered.      PAST MEDICAL HISTORY   has a past medical history of Arthritis, Back pain, Bipolar 2 disorder, Chronic LBP, Chronic neck pain, Depression, Hep C w/o coma, chronic (HCC), HTN (hypertension) (6/2/2016), Melanosis coli (4/20/2017), Shoulder pain, right, Simple cyst of kidney right (6/1/2012), and Tobacco dependence (11/2/2016).    PAST  "FAMILY HISTORY  Family History   Problem Relation Age of Onset   • Cancer Sister 44        breast and cervical/ovarian   • Hypertension Mother    • Diabetes Mother    • Cancer Mother         bone/brain cancer   • No Known Problems Father    • Genitourinary () Problems Sister         fibroid tumors   • Stroke Maternal Grandmother    • Stroke Maternal Grandfather    • Heart Disease Maternal Grandfather    • Lung Disease Neg Hx    • Hyperlipidemia Neg Hx        SOCIAL HISTORY  Social History     Tobacco Use   • Smoking status: Current Every Day Smoker     Years: 35.00   • Smokeless tobacco: Never Used   • Tobacco comment: pt vapes   Vaping Use   • Vaping Use: Every day   • Substances: Nicotine   • Devices: Pre-filled pod   Substance and Sexual Activity   • Alcohol use: No     Comment: used to drink heavily, quit 1998   • Drug use: No     Comment: 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 Quit in 2004   • Sexual activity: Yes     Partners: Male       SURGICAL HISTORY   has a past surgical history that includes primary c section and appendectomy.    CURRENT MEDICATIONS  Home Medications     Reviewed by Gabriele Santiago (Pharmacy Tech) on 08/17/21 at 1357  Med List Status: Complete   Medication Last Dose Status   alprazolam (XANAX) 2 MG tablet UNK Active   baclofen (LIORESAL) 10 MG Tab UNK Active   busPIRone (BUSPAR) 30 MG tablet UNK Active   cyclobenzaprine (FLEXERIL) 10 mg Tab UNK Active   ibuprofen (MOTRIN) 800 MG Tab UNK Active   lamotrigine (LAMICTAL) 150 MG tablet UNK Active   memantine (NAMENDA) 10 MG Tab UNK Active   methadone (DOLOPHINE) 10 MG Tab UNK Active   ziprasidone (GEODON) 80 MG Cap UNK Active                ALLERGIES  Allergies   Allergen Reactions   • Nkda [No Known Drug Allergy]        PHYSICAL EXAM  VITAL SIGNS: /70   Pulse 97   Temp (!) 35.6 °C (96.1 °F) (Temporal)   Resp 14   Ht 1.6 m (5' 3\")   Wt 66.2 kg (146 lb)   SpO2 92%   BMI 25.86 kg/m²    Pulse ox interpretation: On room " air, I interpret this pulse ox as normal.  Constitutional: Chronically ill-appearing lying on the stretcher in significant distress.  HEENT: Normocephalic, bruising to the left brow noted. Edentulous, mucous membranes very dry, dried vomitus around her mouth.  Eyes:  EOMI. pale conjunctivae. Pupils are 3 mm and symmetric.  Neck: Supple, no step-offs.  Chest/Pulmonary: Diminished to ausculation bilaterally, no wheezes or rhonchi.  Cardiovascular: Tachycardic rate, regular rhythm, no murmur.   Abdomen: Soft, nontender; no rebound, guarding, or masses.  Back: No midline step-offs or deformities.  Musculoskeletal: Bruising to both knees noted, no deformities.  Neuro: Very confused, only can intermittently follow commands, withdraws all 4 extremities to painful stimuli.  Psych: Altered, difficult to assess.  Skin: Bruising to both knees, left face, both breasts, and back. Skin otherwise warm and dry.      DIAGNOSTIC STUDIES / PROCEDURES    LABS & EKG  Results for orders placed or performed during the hospital encounter of 08/17/21   Lactic acid (lactate)   Result Value Ref Range    Lactic Acid 3.9 (H) 0.5 - 2.0 mmol/L   Lactic acid (lactate): Repeat if initial lactic acid result is greater than 2   Result Value Ref Range    Lactic Acid 1.8 0.5 - 2.0 mmol/L   CBC WITH DIFFERENTIAL   Result Value Ref Range    WBC 15.8 (H) 4.8 - 10.8 K/uL    RBC 4.79 4.20 - 5.40 M/uL    Hemoglobin 15.4 12.0 - 16.0 g/dL    Hematocrit 41.9 37.0 - 47.0 %    MCV 87.5 81.4 - 97.8 fL    MCH 32.2 27.0 - 33.0 pg    MCHC 36.8 (H) 33.6 - 35.0 g/dL    RDW 40.5 35.9 - 50.0 fL    Platelet Count 247 164 - 446 K/uL    MPV 11.2 9.0 - 12.9 fL    Neutrophils-Polys 92.20 (H) 44.00 - 72.00 %    Lymphocytes 4.10 (L) 22.00 - 41.00 %    Monocytes 2.60 0.00 - 13.40 %    Eosinophils 0.00 0.00 - 6.90 %    Basophils 0.30 0.00 - 1.80 %    Immature Granulocytes 0.80 0.00 - 0.90 %    Nucleated RBC 0.00 /100 WBC    Neutrophils (Absolute) 14.53 (H) 2.00 - 7.15 K/uL     Lymphs (Absolute) 0.65 (L) 1.00 - 4.80 K/uL    Monos (Absolute) 0.41 0.00 - 0.85 K/uL    Eos (Absolute) 0.00 0.00 - 0.51 K/uL    Baso (Absolute) 0.04 0.00 - 0.12 K/uL    Immature Granulocytes (abs) 0.12 (H) 0.00 - 0.11 K/uL    NRBC (Absolute) 0.00 K/uL   COMP METABOLIC PANEL   Result Value Ref Range    Sodium 141 135 - 145 mmol/L    Potassium 3.5 (L) 3.6 - 5.5 mmol/L    Chloride 106 96 - 112 mmol/L    Co2 20 20 - 33 mmol/L    Anion Gap 15.0 7.0 - 16.0    Glucose 132 (H) 65 - 99 mg/dL    Bun 14 8 - 22 mg/dL    Creatinine 0.75 0.50 - 1.40 mg/dL    Calcium 9.1 8.5 - 10.5 mg/dL    AST(SGOT) 31 12 - 45 U/L    ALT(SGPT) 20 2 - 50 U/L    Alkaline Phosphatase 64 30 - 99 U/L    Total Bilirubin 0.7 0.1 - 1.5 mg/dL    Albumin 3.5 3.2 - 4.9 g/dL    Total Protein 6.2 6.0 - 8.2 g/dL    Globulin 2.7 1.9 - 3.5 g/dL    A-G Ratio 1.3 g/dL   URINALYSIS    Specimen: Urine, Clean Catch   Result Value Ref Range    Color DK Yellow     Character Clear     Specific Gravity 1.020 <1.035    Ph 5.5 5.0 - 8.0    Glucose Negative Negative mg/dL    Ketones 15 (A) Negative mg/dL    Protein Negative Negative mg/dL    Bilirubin Negative Negative    Urobilinogen, Urine 1.0 Negative    Nitrite Negative Negative    Leukocyte Esterase Negative Negative    Occult Blood Negative Negative    Micro Urine Req see below    ACETAMINOPHEN   Result Value Ref Range    Acetaminophen -Tylenol <5 (L) 10 - 30 ug/mL   Salicylate   Result Value Ref Range    Salicylates, Quant. <1 (L) 15 - 25 mg/dL   URINE DRUG SCREEN   Result Value Ref Range    Amphetamines Urine Negative Negative    Barbiturates Negative Negative    Benzodiazepines Positive (A) Negative    Cocaine Metabolite Negative Negative    Methadone Positive (A) Negative    Opiates Negative Negative    Oxycodone Negative Negative    Phencyclidine -Pcp Positive (A) Negative    Propoxyphene Negative Negative    Cannabinoid Metab Negative Negative   MAGNESIUM   Result Value Ref Range    Magnesium 1.9 1.5 - 2.5  mg/dL   TROPONIN   Result Value Ref Range    Troponin T 37 (H) 6 - 19 ng/L   PROTHROMBIN TIME   Result Value Ref Range    PT 13.6 12.0 - 14.6 sec    INR 1.07 0.87 - 1.13   APTT   Result Value Ref Range    APTT 25.3 24.7 - 36.0 sec   COV-2, FLU A/B, AND RSV BY PCR (2-4 HOURS CEPHEID): Collect NP swab in VTM    Specimen: Respirate   Result Value Ref Range    Influenza virus A RNA Negative Negative    Influenza virus B, PCR Negative Negative    RSV, PCR Negative Negative    SARS-CoV-2 by PCR NotDetected     SARS-CoV-2 Source NP Swab    CRP Quantitative (Non-Cardiac)   Result Value Ref Range    Stat C-Reactive Protein 0.31 0.00 - 0.75 mg/dL   Procalcitonin   Result Value Ref Range    Procalcitonin <0.05 <0.25 ng/mL   DIAGNOSTIC ALCOHOL   Result Value Ref Range    Diagnostic Alcohol <10.1 0.0 - 10.0 mg/dL   Comp Metabolic Panel   Result Value Ref Range    Sodium 141 135 - 145 mmol/L    Potassium 3.4 (L) 3.6 - 5.5 mmol/L    Chloride 105 96 - 112 mmol/L    Co2 15 (L) 20 - 33 mmol/L    Anion Gap 21.0 (H) 7.0 - 16.0    Glucose 211 (H) 65 - 99 mg/dL    Bun 16 8 - 22 mg/dL    Creatinine 0.96 0.50 - 1.40 mg/dL    Calcium 9.9 8.5 - 10.5 mg/dL    AST(SGOT) 35 12 - 45 U/L    ALT(SGPT) 24 2 - 50 U/L    Alkaline Phosphatase 75 30 - 99 U/L    Total Bilirubin 0.8 0.1 - 1.5 mg/dL    Albumin 4.0 3.2 - 4.9 g/dL    Total Protein 7.1 6.0 - 8.2 g/dL    Globulin 3.1 1.9 - 3.5 g/dL    A-G Ratio 1.3 g/dL   ESTIMATED GFR   Result Value Ref Range    GFR If African American >60 >60 mL/min/1.73 m 2    GFR If Non African American 59 (A) >60 mL/min/1.73 m 2   CREATINE KINASE   Result Value Ref Range    CPK Total 188 (H) 0 - 154 U/L   TROPONIN   Result Value Ref Range    Troponin T 72 (H) 6 - 19 ng/L   ESTIMATED GFR   Result Value Ref Range    GFR If African American >60 >60 mL/min/1.73 m 2    GFR If Non African American >60 >60 mL/min/1.73 m 2   EKG (NOW)   Result Value Ref Range    Report       [facility] Emergency Dept.    Test  Date:  2021  Pt Name:    ANGELITO PAYNE                Department: ER  MRN:        [id number]                      Room:        02  Gender:     Female                       Technician: [id number]  :        1960                   Requested By:ROYAL HIGHTOWER  Order #:    [id number]                    Reading MD: Royal Hightower MD    Measurements  Intervals                                Axis  Rate:       113                          P:          71  MA:         192                          QRS:        -33  QRSD:       90                           T:          115  QT:         308  QTc:        423    Interpretive Statements  SINUS TACHYCARDIA  LEFT AXIS DEVIATION  BORDERLINE REPOLARIZATION ABNORMALITY  Compared to ECG 2015 07:22:35  Strain pattern no STEMI  Electronically Signed On 2021 16:50:37 PDT by Royal Hightower MD           RADIOLOGY  CT-CTA NECK WITH & W/O-POST PROCESSING   Final Result      CT angiogram of the neck within normal limits.      CT-CTA HEAD WITH & W/O-POST PROCESS   Final Result      1.  No thrombosis is seen within the [place].   2.  Fetal origin of the right posterior cerebral artery.      CT-CEREBRAL PERFUSION ANALYSIS   Final Result      1.  Cerebral blood flow less than 30% likely representing completed infarct = 0 mL.      2.  T Max more than 6 seconds likely representing combination of completed infarct and ischemia = 9 mL.      3.  Mismatched volume likely representing ischemic brain/penumbra = 9 mL      4.  Please note that the cerebral perfusion was performed on the limited brain tissue around the basal ganglia region. Infarct/ischemia outside the CT perfusion sections can be missed in this study.      CT-HEAD W/O   Final Result      1.  No acute intracranial abnormality is identified.   2.  Atrophy   3.  There are mild periventricular and subcortical white matter changes present.  This finding is nonspecific and could be from previous small vessel  ischemia, demyelination, or gliosis.      CT-CSPINE WITHOUT PLUS RECONS   Final Result         1.  Negative CT scan of the cervical spine.  No fracture or subluxation.      2.  Mild degenerative changes of the cervical spine.      DX-CHEST-PORTABLE (1 VIEW)   Final Result      No acute cardiac or pulmonary abnormality is noted.          PROCEDURES    Lumbar Puncture Procedure Note    Indication: Suspected meningitis    Consent: Unable to be obtained due to patient's condition.    Procedure: The patient was placed in the sitting, supported by bed side stand position and the appropriate landmarks were identified. The area was prepped and draped in the usual sterile fashion. Anesthesia was obtained using 2.5 cc of 2% Lidocaine with epinephrine. A spinal needle was inserted at the L3- L4 level with the stylet in place until spinal fluid was returned. Opening pressure was not measured. At this point 4.0 cc of clear cerebral spinal fluid was obtained and sent for appropriate testing. The stylet was then replaced and the needle was withdrawn. A sterile dressing was placed over the site and the patient was placed in the supine position.    The patient tolerated the procedure well.    Complications: None        COURSE & MEDICAL DECISION MAKING    This is a 61 y.o. female who presents with altered mental status last normal last night.    Differential Diagnosis includes but is not limited to:  Toxic encephalopathy, metabolic encephalopathy, infection, stroke, bleed    ED Course:  61-year-old female coming in very altered, well over 12 hours since last known normal, but we will proceed with stroke imaging and broad work-up immediately. Given time window she is obviously not a candidate for alteplase/TPA. High suspicion for toxic encephalopathy given she is missing a significant amount of muscle relaxers prescribed only 4 days ago.    Labs concerning for leukocytosis and lactic acidosis, thankfully no kidney injury on chemistry.   Troponin minimally elevated no chest pain, strain pattern present on EKG no STEMI plan to trend.  Urine drug screen positive, but recently started on muscle relaxer PCP is positive but could be false positive from other medications, she is if anything very hypoactive with her delirious state.  Thankfully nothing acute on brain imaging, no signs of large vessel occlusion or bleed.  Lactic acidosis clearing with aggressive IV fluid rehydration.  Given potential of sepsis with multiple sirs criteria broad-spectrum antibiotics ordered after discussion with clinical pharmacist we will initiate vancomycin and ceftriaxone, and CSF was obtained via lumbar puncture see procedure note.  Patient is clinically slightly improving, troponin climbing plan again to trend, hospitalist consulted they will kindly admit for further work-up and treatment.  Hopefully this is polypharmacy, patient's mental status is slowly improving but she is not back to baseline ingestion work-up otherwise negative, patient hemodynamically stable for admission in guarded condition.    Upon my evaluation, this patient had a high probability of imminent or life-threatening deterioration due to acute encephalopathy, lactic acidosis.     I personally provided 35 minutes of total critical care time outside of time spent on separately billable/documented procedures. This required my direct attention, intervention, and management which included the following:  -review of laboratory data  -review of radiology studies  -discussion with consultants: Pharmacist  -monitoring for potential decompensation  -Aggressive IV fluid rehydration    Medications   lactated ringers infusion (BOLUS) (0 mL Intravenous Held 8/17/21 1345)   lidocaine-epinephrine 2 %-1:[id number] injection 10 mL (has no administration in time range)   vancomycin (VANCOCIN) 1,750 mg in  mL IVPB (has no administration in time range)   busPIRone (BUSPAR) tablet 30 mg (has no administration in time  range)   lamoTRIgine (LAMICTAL) tablet 300 mg (has no administration in time range)   memantine (Namenda) tablet 10 mg (has no administration in time range)   ziprasidone (GEODON) capsule 160 mg (has no administration in time range)   senna-docusate (PERICOLACE or SENOKOT S) 8.6-50 MG per tablet 2 Tablet (has no administration in time range)     And   polyethylene glycol/lytes (MIRALAX) PACKET 1 Packet (has no administration in time range)     And   magnesium hydroxide (MILK OF MAGNESIA) suspension 30 mL (has no administration in time range)     And   bisacodyl (DULCOLAX) suppository 10 mg (has no administration in time range)   0.9 % NaCl with KCl 20 mEq infusion (has no administration in time range)   enoxaparin (LOVENOX) inj 40 mg (has no administration in time range)   acetaminophen (Tylenol) tablet 650 mg (has no administration in time range)   cloNIDine (CATAPRES) tablet 0.1 mg (has no administration in time range)   enalaprilat (VASOTEC) injection 1.25 mg (has no administration in time range)   labetalol (NORMODYNE/TRANDATE) injection 10 mg (has no administration in time range)   ondansetron (ZOFRAN) syringe/vial injection 4 mg (has no administration in time range)   ondansetron (ZOFRAN ODT) dispertab 4 mg (has no administration in time range)   promethazine (PHENERGAN) tablet 12.5-25 mg (has no administration in time range)   promethazine (PHENERGAN) suppository 12.5-25 mg (has no administration in time range)   prochlorperazine (COMPAZINE) injection 5-10 mg (has no administration in time range)   ibuprofen (MOTRIN) tablet 800 mg (has no administration in time range)   nicotine (NICODERM) 21 MG/24HR 21 mg (has no administration in time range)     And   nicotine polacrilex (NICORETTE) 2 MG piece 2 mg (has no administration in time range)   lactated ringers infusion (BOLUS) (1,000 mL Intravenous New Bag 8/17/21 6494)   lactated ringers infusion (BOLUS) (1,000 mL Intravenous New Bag 8/17/21 1417)   iohexol  (OMNIPAQUE) 350 mg/mL (40 mL Intravenous Given 8/17/21 1506)   iohexol (OMNIPAQUE) 350 mg/mL (80 mL Intravenous Given 8/17/21 1508)   cefTRIAXone (ROCEPHIN) 2 g in  mL IVPB (2 g Intravenous New Bag 8/17/21 1615)       FINAL IMPRESSION  1. Acute encephalopathy    2. Dehydration    3. Polypharmacy    4. Lactic acidosis    5. Elevated troponin    6. Opioid type dependence, continuous (HCC)    7. Chronic neck pain    8. Chronic low back pain, unspecified back pain laterality, unspecified whether sciatica present        -ADMIT-      Pertinent Labs & Imaging studies reviewed and verified by myself, as well as nursing notes and the patient's past medical, family, and social histories (See chart for details).    Portions of this record were made with voice recognition software.  Despite my review, spelling/grammar/context errors may still remain.  Interpretation of this chart should be taken in this context.    Electronically signed by Royal Ulloa M.D. on 8/17/2021 at 4:54 PM.

## 2021-08-17 NOTE — ASSESSMENT & PLAN NOTE
Of chronic pain.  Recently diagnosed with acute right shoulder strain and prescribed opioids and muscle relaxers. Also on methadone for unknown reasons.  Now presenting with concern for polypharmacy.  Avoid opioids at this time.  Tylenol and ibuprofen as needed for pain control.

## 2021-08-17 NOTE — ASSESSMENT & PLAN NOTE
This is Sepsis Present on admission  SIRS criteria identified on my evaluation include: Tachycardia, with heart rate greater than 90 BPM, Tachypnea, with respirations greater than 20 per minute and Leukocytosis, with WBC greater than 12,000  Source is unknown.  Sepsis protocol initiated  Fluid resuscitation ordered per protocol  IV antibiotics as appropriate for source of sepsis  While organ dysfunction may be noted elsewhere in this problem list or in the chart, degree of organ dysfunction does not meet CMS criteria for severe sepsis    Patient resuscitated in the emergency room.  1/2 BC positive, although suspect contamination.   8/18: Infectious work-up unremarkable.  Suspect polypharmacy.  Sepsis ruled out.

## 2021-08-17 NOTE — ASSESSMENT & PLAN NOTE
Likely reactive.  Cultures and CSF analysis negative for evidence of infection.  Leukocytosis improving.

## 2021-08-17 NOTE — ED NOTES
Med Rec completed per patient's home pharmacy ([facility])  Allergies reviewed  No ORAL antibiotics in last 30 days

## 2021-08-17 NOTE — ASSESSMENT & PLAN NOTE
Pt BIB EMS from home where she lives with her . Pt was last seen well last night before going to bed. Pt's  came home from work this afternoon and found pt prone on the ground and altered. Pt has a new prescription for Cyclobenzabrine with 6 out of 30 remaining. If taken appropriately there should be 18 remaining. Pt currently verbal and following commands but confused.      In the ED, work-up concerning for dehydration with a lactic acid, leukocytosis and tachycardia.  Extensive imaging of the head and neck in the ED unremarkable.  UDS positive for benzos, methadone and phencyclidine.  Procalcitonin negative.  Received a dose of vancomycin and Rocephin in the ED to cover meningitis. We will hold antibiotics for now.  Status post LP.  CSF analysis reveals mildly elevated glucose with no other significant abnormalities.  Suspect secondary to polypharmacy.  Avoid opioids and sedatives for now.  8/19:  Mental status appears to be close to baseline.  Restart home methadone to prevent withdrawals.

## 2021-08-17 NOTE — H&P
Hospital Medicine History & Physical Note    Date of Service  8/17/2021    Primary Care Physician  TOPHER Clark.    Consultants  none    Code Status  Full Code    Chief Complaint  Chief Complaint   Patient presents with   • ALOC     Pt BIB EMS from home where she lives with her . Pt was last seen well last night before going to bed. Pt's  came home from work this afternoon and found pt prone on the ground and altered. Pt has a new prescription for Cyclobenzabrine with 6 out of 30 remaining. If taken appropriately there should be 18 remaining. Pt currently verbal and following commands but confused.        History of Presenting Illness  Khalida Delaney is a 61 y.o. female who presented 8/17/2021 with confusion.  She presented to hospital, brought in by her significant other for concerns for acute confusion. He states he left work early morning and return home later in the day and found patient prone on the floor. States she was incredibly altered, confused and minimally responsive. So he called 911, medics found patient to have some mild hypoglycemia, tachycardic, and slowly following commands but very confused.  Chart review does note she has a history of chronic pain, chronically on methadone, and was recently prescribed Flexeril for acute shoulder pain. Medic states she had just 6 tablets left in the pill bottle of about 30 tabs. This was originally prescribed about 4 days ago.    In the ED, vital signs with findings of tachycardia tachypnea. Lab work did note hypokalemia, leukocytosis and lactic acidosis.   Due to her presenting symptoms, the ERP had concerns for meningitis, as result LP was attempted.  She was received a dose of IV Rocephin and vancomycin to cover for meningitis. Extensive imaging of the head and neck was unremarkable for any acute findings. Chest x-ray also unremarkable.  She was treated with sepsis bolus and blood/urine cultures were drawn.    I discussed the  plan of care with patient and bedside RN.    Review of Systems  Review of Systems   Constitutional: Positive for diaphoresis and malaise/fatigue.   Respiratory: Negative.    Cardiovascular: Negative.    Gastrointestinal: Positive for nausea and vomiting.   Genitourinary: Negative.    Musculoskeletal: Positive for neck pain.   Neurological: Positive for weakness.   All other systems reviewed and are negative.      Past Medical History   has a past medical history of Arthritis, Back pain, Bipolar 2 disorder, Chronic LBP, Chronic neck pain, Depression, Hep C w/o coma, chronic (HCC), HTN (hypertension) (6/2/2016), Melanosis coli (4/20/2017), Shoulder pain, right, Simple cyst of kidney right (6/1/2012), and Tobacco dependence (11/2/2016).    Surgical History   has a past surgical history that includes primary c section and appendectomy.     Family History  family history includes Cancer in her mother; Cancer (age of onset: 44) in her sister; Diabetes in her mother; Genitourinary () Problems in her sister; Heart Disease in her maternal grandfather; Hypertension in her mother; No Known Problems in her father; Stroke in her maternal grandfather and maternal grandmother.   Family history reviewed with patient. There is no family history that is pertinent to the chief complaint.     Social History   reports that she has been smoking. She has smoked for the past 35.00 years. She has never used smokeless tobacco. She reports that she does not drink alcohol and does not use drugs.    Allergies  Allergies   Allergen Reactions   • Nkda [No Known Drug Allergy]        Medications  Prior to Admission Medications   Prescriptions Last Dose Informant Patient Reported? Taking?   alprazolam (XANAX) 2 MG tablet UNK at [facility] Patient's Home Pharmacy Yes No   Sig: Take 2 mg by mouth 3 times a day.   baclofen (LIORESAL) 10 MG Tab UNK at [facility] Patient's Home Pharmacy No No   Sig: Take 1 tablet by mouth 3 times a day.   busPIRone (BUSPAR) 30 MG  tablet UNK at [facility] Patient's Home Pharmacy Yes No   Sig: Take  by mouth. Take 1 tablet by mouth two times a day as directed   cyclobenzaprine (FLEXERIL) 10 mg Tab UNK at [facility] Patient's Home Pharmacy No No   Sig: Take 1 Tablet by mouth 3 times a day as needed.   ibuprofen (MOTRIN) 800 MG Tab UNK at [facility] Patient's Home Pharmacy No No   Sig: Take 1 tablet by mouth every 8 hours as needed for Moderate Pain. Take with food   lamotrigine (LAMICTAL) 150 MG tablet UNK at [facility] Patient's Home Pharmacy Yes No   Sig: Take 300 mg by mouth at bedtime.   memantine (NAMENDA) 10 MG Tab UNK at [facility] Patient's Home Pharmacy Yes Yes   Sig: Take 10 mg by mouth 2 times a day.   methadone (DOLOPHINE) 10 MG Tab UNK at [facility] Patient's Home Pharmacy Yes No   Sig: Take 30 mg by mouth 2 times a day.   ziprasidone (GEODON) 80 MG Cap UNK at [facility] Patient's Home Pharmacy Yes No   Sig: Take 160 mg by mouth every bedtime.      Facility-Administered Medications: None       Physical Exam  Temp:  [35.6 °C (96.1 °F)] 35.6 °C (96.1 °F)  Pulse:  [] 91  Resp:  [14-20] 20  BP: (152-171)/(70-86) 171/85  SpO2:  [90 %-93 %] 93 %    Physical Exam  Constitutional:       Appearance: She is ill-appearing.   HENT:      Head: Normocephalic and atraumatic.      Nose: Nose normal.   Eyes:      Conjunctiva/sclera: Conjunctivae normal.   Cardiovascular:      Rate and Rhythm: Tachycardia present.      Heart sounds: Normal heart sounds.   Pulmonary:      Effort: Pulmonary effort is normal.      Breath sounds: Normal breath sounds.   Abdominal:      General: Bowel sounds are normal.      Palpations: Abdomen is soft.   Musculoskeletal:         General: No swelling or tenderness.      Comments: Right knee abrasions noted.   Skin:     General: Skin is dry.   Neurological:      Mental Status: She is alert. She is disoriented.      Comments: Alert, oriented to person, place and event.  Moving all extremities without provocation.   Psychiatric:         Mood and Affect: Mood normal.          Laboratory:  Recent Labs     08/17/21  1328   WBC 15.8*   RBC 4.79   HEMOGLOBIN 15.4   HEMATOCRIT 41.9   MCV 87.5   MCH 32.2   MCHC 36.8*   RDW 40.5   PLATELETCT 247   MPV 11.2     Recent Labs     08/17/21  1328 08/17/21  1521   SODIUM 141 141   POTASSIUM 3.4* 3.5*   CHLORIDE 105 106   CO2 15* 20   GLUCOSE 211* 132*   BUN 16 14   CREATININE 0.96 0.75   CALCIUM 9.9 9.1     Recent Labs     08/17/21  1328 08/17/21  1521   ALTSGPT 24 20   ASTSGOT 35 31   ALKPHOSPHAT 75 64   TBILIRUBIN 0.8 0.7   GLUCOSE 211* 132*     Recent Labs     08/17/21  1328   APTT 25.3   INR 1.07     No results for input(s): NTPROBNP in the last 72 hours.      Recent Labs     08/17/21  1328 08/17/21  1521   TROPONINT 37* 72*       Imaging:  CT-CTA NECK WITH & W/O-POST PROCESSING   Final Result      CT angiogram of the neck within normal limits.      CT-CTA HEAD WITH & W/O-POST PROCESS   Final Result      1.  No thrombosis is seen within the [place].   2.  Fetal origin of the right posterior cerebral artery.      CT-CEREBRAL PERFUSION ANALYSIS   Final Result      1.  Cerebral blood flow less than 30% likely representing completed infarct = 0 mL.      2.  T Max more than 6 seconds likely representing combination of completed infarct and ischemia = 9 mL.      3.  Mismatched volume likely representing ischemic brain/penumbra = 9 mL      4.  Please note that the cerebral perfusion was performed on the limited brain tissue around the basal ganglia region. Infarct/ischemia outside the CT perfusion sections can be missed in this study.      CT-HEAD W/O   Final Result      1.  No acute intracranial abnormality is identified.   2.  Atrophy   3.  There are mild periventricular and subcortical white matter changes present.  This finding is nonspecific and could be from previous small vessel ischemia, demyelination, or gliosis.      CT-CSPINE WITHOUT PLUS RECONS   Final Result         1.  Negative CT scan of the cervical spine.  No fracture or  subluxation.      2.  Mild degenerative changes of the cervical spine.      DX-CHEST-PORTABLE (1 VIEW)   Final Result      No acute cardiac or pulmonary abnormality is noted.          X-Ray:  I have personally reviewed the images and compared with prior images.  EKG:  I have personally reviewed the images and compared with prior images.    Assessment/Plan:  I anticipate this patient will require at least two midnights for appropriate medical management, necessitating inpatient admission.    * Acute metabolic encephalopathy  Assessment & Plan  Pt BIB EMS from home where she lives with her . Pt was last seen well last night before going to bed. Pt's  came home from work this afternoon and found pt prone on the ground and altered. Pt has a new prescription for Cyclobenzabrine with 6 out of 30 remaining. If taken appropriately there should be 18 remaining. Pt currently verbal and following commands but confused.      In the ED, work-up concerning for dehydration with a lactic acid, leukocytosis and tachycardia.  Extensive imaging of the head and neck in the ED unremarkable.  LP attempted in the ED  UDS positive for benzos, methadone and phencyclidine.  Procalcitonin negative.    Differential diagnosis at this time include polypharmacy highly likely, infectious, metabolic.    Received a dose of vancomycin and Rocephin in the ED to cover meningitis. We will hold antibiotics for now.  Follow-up on LP studies.  Avoid opioids and sedatives for now.  Follow-up on urine/blood cultures.  Closely monitor mental status.  IV fluids to help treat dehydration.    Sepsis (HCC)  Assessment & Plan  This is Sepsis Present on admission  SIRS criteria identified on my evaluation include: Tachycardia, with heart rate greater than 90 BPM, Tachypnea, with respirations greater than 20 per minute and Leukocytosis, with WBC greater than 12,000  Source is unknown.  Sepsis protocol initiated  Fluid resuscitation ordered per  protocol  IV antibiotics as appropriate for source of sepsis  While organ dysfunction may be noted elsewhere in this problem list or in the chart, degree of organ dysfunction does not meet CMS criteria for severe sepsis    Patient resuscitated in the emergency room.  Follow-up on cultures.  Follow-up on LP studies.    Elevated troponin  Assessment & Plan  Patient denies any active chest pain or discomfort.  EKG with no significant ST changes.  Not ACS.  Trend for now.    Anxiety and depression  Assessment & Plan  Continue current home medications.    Leukocytosis  Assessment & Plan  Likely reactive.  Follow-up on cultures and LP studies.  Daily CBC.    Hypokalemia  Assessment & Plan  Monitor  Replete as needed.    Tobacco dependence- (present on admission)  Assessment & Plan  Counseling provided at bedside for 20 mins    Chronic neck pain- (present on admission)  Assessment & Plan  Of chronic pain.  Recently diagnosed with acute right shoulder strain and prescribed opioids and muscle relaxers. Also on methadone for unknown reasons.  Now presenting with concern for polypharmacy.  Avoid opioids at this time.  Tylenol and ibuprofen as needed for pain control.      VTE prophylaxis: enoxaparin ppx

## 2021-08-17 NOTE — ED TRIAGE NOTES
"Chief Complaint   Patient presents with   • ALOC     Pt BIB EMS from home where she lives with her . Pt was last seen well last night before going to bed. Pt's  came home from work this afternoon and found pt prone on the ground and altered. Pt has a new prescription for Cyclobenzabrine with 6 out of 30 remaining. If taken appropriately there should be 18 remaining. Pt currently verbal and following commands but confused.      /86   Pulse (!) 117   Temp (!) 35.6 °C (96.1 °F) (Temporal)   Resp 14   Ht 1.6 m (5' 3\")   Wt 66.2 kg (146 lb)   SpO2 93%   BMI 25.86 kg/m²    mg/dL per EMS.   "

## 2021-08-18 ENCOUNTER — APPOINTMENT (OUTPATIENT)
Dept: RADIOLOGY | Facility: MEDICAL CENTER | Age: 61
DRG: 071 | End: 2021-08-18
Attending: NURSE PRACTITIONER
Payer: MEDICARE

## 2021-08-18 PROBLEM — E86.0 DEHYDRATION: Status: ACTIVE | Noted: 2021-08-18

## 2021-08-18 PROBLEM — R53.1 GENERALIZED WEAKNESS: Status: ACTIVE | Noted: 2021-08-18

## 2021-08-18 LAB
ANION GAP SERPL CALC-SCNC: 15 MMOL/L (ref 7–16)
BASOPHILS # BLD AUTO: 0.2 % (ref 0–1.8)
BASOPHILS # BLD: 0.02 K/UL (ref 0–0.12)
BUN SERPL-MCNC: 9 MG/DL (ref 8–22)
CALCIUM SERPL-MCNC: 8.5 MG/DL (ref 8.5–10.5)
CHLORIDE SERPL-SCNC: 106 MMOL/L (ref 96–112)
CO2 SERPL-SCNC: 21 MMOL/L (ref 20–33)
CREAT SERPL-MCNC: 0.57 MG/DL (ref 0.5–1.4)
EOSINOPHIL # BLD AUTO: 0 K/UL (ref 0–0.51)
EOSINOPHIL NFR BLD: 0 % (ref 0–6.9)
ERYTHROCYTE [DISTWIDTH] IN BLOOD BY AUTOMATED COUNT: 40 FL (ref 35.9–50)
GLUCOSE SERPL-MCNC: 122 MG/DL (ref 65–99)
HCT VFR BLD AUTO: 37.4 % (ref 37–47)
HGB BLD-MCNC: 13.9 G/DL (ref 12–16)
IMM GRANULOCYTES # BLD AUTO: 0.1 K/UL (ref 0–0.11)
IMM GRANULOCYTES NFR BLD AUTO: 0.8 % (ref 0–0.9)
LYMPHOCYTES # BLD AUTO: 1.81 K/UL (ref 1–4.8)
LYMPHOCYTES NFR BLD: 13.8 % (ref 22–41)
MAGNESIUM SERPL-MCNC: 1.7 MG/DL (ref 1.5–2.5)
MCH RBC QN AUTO: 32.4 PG (ref 27–33)
MCHC RBC AUTO-ENTMCNC: 37.2 G/DL (ref 33.6–35)
MCV RBC AUTO: 87.2 FL (ref 81.4–97.8)
MONOCYTES # BLD AUTO: 0.68 K/UL (ref 0–0.85)
MONOCYTES NFR BLD AUTO: 5.2 % (ref 0–13.4)
NEUTROPHILS # BLD AUTO: 10.55 K/UL (ref 2–7.15)
NEUTROPHILS NFR BLD: 80 % (ref 44–72)
NRBC # BLD AUTO: 0 K/UL
NRBC BLD-RTO: 0 /100 WBC
PLATELET # BLD AUTO: 237 K/UL (ref 164–446)
PMV BLD AUTO: 10.8 FL (ref 9–12.9)
POTASSIUM SERPL-SCNC: 3.5 MMOL/L (ref 3.6–5.5)
RBC # BLD AUTO: 4.29 M/UL (ref 4.2–5.4)
SODIUM SERPL-SCNC: 142 MMOL/L (ref 135–145)
TROPONIN T SERPL-MCNC: 33 NG/L (ref 6–19)
TSH SERPL DL<=0.005 MIU/L-ACNC: 0.85 UIU/ML (ref 0.38–5.33)
WBC # BLD AUTO: 13.2 K/UL (ref 4.8–10.8)

## 2021-08-18 PROCEDURE — 74018 RADEX ABDOMEN 1 VIEW: CPT

## 2021-08-18 PROCEDURE — 99233 SBSQ HOSP IP/OBS HIGH 50: CPT | Performed by: NURSE PRACTITIONER

## 2021-08-18 PROCEDURE — 80048 BASIC METABOLIC PNL TOTAL CA: CPT

## 2021-08-18 PROCEDURE — 700101 HCHG RX REV CODE 250: Performed by: STUDENT IN AN ORGANIZED HEALTH CARE EDUCATION/TRAINING PROGRAM

## 2021-08-18 PROCEDURE — 770004 HCHG ROOM/CARE - ONCOLOGY PRIVATE *

## 2021-08-18 PROCEDURE — A9270 NON-COVERED ITEM OR SERVICE: HCPCS | Performed by: STUDENT IN AN ORGANIZED HEALTH CARE EDUCATION/TRAINING PROGRAM

## 2021-08-18 PROCEDURE — 97165 OT EVAL LOW COMPLEX 30 MIN: CPT

## 2021-08-18 PROCEDURE — 85025 COMPLETE CBC W/AUTO DIFF WBC: CPT

## 2021-08-18 PROCEDURE — 306015 LOCK STAT FOLEY: Performed by: STUDENT IN AN ORGANIZED HEALTH CARE EDUCATION/TRAINING PROGRAM

## 2021-08-18 PROCEDURE — 700111 HCHG RX REV CODE 636 W/ 250 OVERRIDE (IP): Performed by: STUDENT IN AN ORGANIZED HEALTH CARE EDUCATION/TRAINING PROGRAM

## 2021-08-18 PROCEDURE — 36415 COLL VENOUS BLD VENIPUNCTURE: CPT

## 2021-08-18 PROCEDURE — 84484 ASSAY OF TROPONIN QUANT: CPT

## 2021-08-18 PROCEDURE — 84443 ASSAY THYROID STIM HORMONE: CPT

## 2021-08-18 PROCEDURE — 700102 HCHG RX REV CODE 250 W/ 637 OVERRIDE(OP): Performed by: STUDENT IN AN ORGANIZED HEALTH CARE EDUCATION/TRAINING PROGRAM

## 2021-08-18 PROCEDURE — 93005 ELECTROCARDIOGRAM TRACING: CPT | Performed by: INTERNAL MEDICINE

## 2021-08-18 PROCEDURE — 700102 HCHG RX REV CODE 250 W/ 637 OVERRIDE(OP): Performed by: NURSE PRACTITIONER

## 2021-08-18 PROCEDURE — A9270 NON-COVERED ITEM OR SERVICE: HCPCS | Performed by: INTERNAL MEDICINE

## 2021-08-18 PROCEDURE — 83735 ASSAY OF MAGNESIUM: CPT

## 2021-08-18 PROCEDURE — A9270 NON-COVERED ITEM OR SERVICE: HCPCS | Performed by: NURSE PRACTITIONER

## 2021-08-18 PROCEDURE — 97162 PT EVAL MOD COMPLEX 30 MIN: CPT

## 2021-08-18 PROCEDURE — 700102 HCHG RX REV CODE 250 W/ 637 OVERRIDE(OP): Performed by: INTERNAL MEDICINE

## 2021-08-18 RX ORDER — METHADONE HYDROCHLORIDE 10 MG/1
30 TABLET ORAL 2 TIMES DAILY
Status: DISCONTINUED | OUTPATIENT
Start: 2021-08-18 | End: 2021-08-20 | Stop reason: HOSPADM

## 2021-08-18 RX ORDER — POTASSIUM CHLORIDE 20 MEQ/1
20 TABLET, EXTENDED RELEASE ORAL ONCE
Status: COMPLETED | OUTPATIENT
Start: 2021-08-18 | End: 2021-08-18

## 2021-08-18 RX ADMIN — BUSPIRONE HYDROCHLORIDE 30 MG: 10 TABLET ORAL at 05:58

## 2021-08-18 RX ADMIN — MEMANTINE HYDROCHLORIDE 10 MG: 10 TABLET ORAL at 06:02

## 2021-08-18 RX ADMIN — MAGNESIUM HYDROXIDE 30 ML: 400 SUSPENSION ORAL at 11:54

## 2021-08-18 RX ADMIN — POTASSIUM CHLORIDE AND SODIUM CHLORIDE: 900; 150 INJECTION, SOLUTION INTRAVENOUS at 19:09

## 2021-08-18 RX ADMIN — ONDANSETRON 4 MG: 2 INJECTION INTRAMUSCULAR; INTRAVENOUS at 07:32

## 2021-08-18 RX ADMIN — POTASSIUM CHLORIDE 20 MEQ: 1500 TABLET, EXTENDED RELEASE ORAL at 10:35

## 2021-08-18 RX ADMIN — IBUPROFEN 800 MG: 800 TABLET, FILM COATED ORAL at 05:58

## 2021-08-18 RX ADMIN — IBUPROFEN 800 MG: 800 TABLET, FILM COATED ORAL at 12:00

## 2021-08-18 RX ADMIN — BISACODYL 10 MG: 10 SUPPOSITORY RECTAL at 14:30

## 2021-08-18 RX ADMIN — METHADONE HYDROCHLORIDE 30 MG: 10 TABLET ORAL at 20:54

## 2021-08-18 RX ADMIN — POTASSIUM CHLORIDE AND SODIUM CHLORIDE: 900; 150 INJECTION, SOLUTION INTRAVENOUS at 07:23

## 2021-08-18 RX ADMIN — BUSPIRONE HYDROCHLORIDE 30 MG: 10 TABLET ORAL at 17:37

## 2021-08-18 RX ADMIN — DOCUSATE SODIUM 50 MG AND SENNOSIDES 8.6 MG 2 TABLET: 8.6; 5 TABLET, FILM COATED ORAL at 17:37

## 2021-08-18 RX ADMIN — DOCUSATE SODIUM 50 MG AND SENNOSIDES 8.6 MG 2 TABLET: 8.6; 5 TABLET, FILM COATED ORAL at 05:58

## 2021-08-18 RX ADMIN — MAGNESIUM OXIDE 400 MG (241.3 MG MAGNESIUM) TABLET 400 MG: TABLET at 10:35

## 2021-08-18 RX ADMIN — MEMANTINE HYDROCHLORIDE 10 MG: 10 TABLET ORAL at 17:37

## 2021-08-18 RX ADMIN — NICOTINE TRANSDERMAL SYSTEM 21 MG: 21 PATCH, EXTENDED RELEASE TRANSDERMAL at 06:00

## 2021-08-18 RX ADMIN — ONDANSETRON 4 MG: 2 INJECTION INTRAMUSCULAR; INTRAVENOUS at 14:18

## 2021-08-18 RX ADMIN — MAGNESIUM OXIDE 400 MG (241.3 MG MAGNESIUM) TABLET 400 MG: TABLET at 17:37

## 2021-08-18 RX ADMIN — PROCHLORPERAZINE EDISYLATE 5 MG: 5 INJECTION INTRAMUSCULAR; INTRAVENOUS at 17:38

## 2021-08-18 RX ADMIN — ZIPRASIDONE HYDROCHLORIDE 160 MG: 80 CAPSULE ORAL at 20:54

## 2021-08-18 RX ADMIN — PROCHLORPERAZINE EDISYLATE 5 MG: 5 INJECTION INTRAMUSCULAR; INTRAVENOUS at 08:46

## 2021-08-18 RX ADMIN — LAMOTRIGINE 300 MG: 100 TABLET ORAL at 20:54

## 2021-08-18 ASSESSMENT — PAIN DESCRIPTION - PAIN TYPE
TYPE: ACUTE PAIN

## 2021-08-18 ASSESSMENT — ENCOUNTER SYMPTOMS
PALPITATIONS: 0
MYALGIAS: 1
WEAKNESS: 1
NERVOUS/ANXIOUS: 1
VOMITING: 1
COUGH: 0
NAUSEA: 1
DOUBLE VISION: 0
CONSTIPATION: 1
TINGLING: 0
BLURRED VISION: 0
SORE THROAT: 0
DIZZINESS: 0
TREMORS: 0
DIARRHEA: 0
BACK PAIN: 1
HEADACHES: 0
SHORTNESS OF BREATH: 0
CHILLS: 0
FEVER: 0
ABDOMINAL PAIN: 1
NECK PAIN: 1

## 2021-08-18 ASSESSMENT — COGNITIVE AND FUNCTIONAL STATUS - GENERAL
DAILY ACTIVITIY SCORE: 15
MOBILITY SCORE: 8
MOVING FROM LYING ON BACK TO SITTING ON SIDE OF FLAT BED: UNABLE
STANDING UP FROM CHAIR USING ARMS: A LOT
HELP NEEDED FOR BATHING: A LOT
SUGGESTED CMS G CODE MODIFIER MOBILITY: CM
DRESSING REGULAR UPPER BODY CLOTHING: A LITTLE
DRESSING REGULAR LOWER BODY CLOTHING: A LOT
TOILETING: A LOT
CLIMB 3 TO 5 STEPS WITH RAILING: TOTAL
TURNING FROM BACK TO SIDE WHILE IN FLAT BAD: UNABLE
MOVING TO AND FROM BED TO CHAIR: UNABLE
SUGGESTED CMS G CODE MODIFIER DAILY ACTIVITY: CK
EATING MEALS: A LITTLE
WALKING IN HOSPITAL ROOM: A LOT
PERSONAL GROOMING: A LITTLE

## 2021-08-18 ASSESSMENT — ACTIVITIES OF DAILY LIVING (ADL): TOILETING: INDEPENDENT

## 2021-08-18 ASSESSMENT — GAIT ASSESSMENTS: GAIT LEVEL OF ASSIST: UNABLE TO PARTICIPATE

## 2021-08-18 NOTE — PROGRESS NOTES
"Assumed care of patient at 1750. Pt is lethargic, A&Ox3, disoriented to time. Slow to answer questions but is able to state that she is \"in the hospital because I accidentally took too many pills\". On 5 L O2 via NC,  in place. PIV patent and infusing. Bed alarm on for safety.   "

## 2021-08-18 NOTE — THERAPY
Physical Therapy   Initial Evaluation     Patient Name: Khalida Delaney  Age:  61 y.o., Sex:  female  Medical Record #: [id number]  Today's Date: 8/18/2021     Precautions: Fall Risk    Assessment  Patient is a 61 y.o. female admitted with acute metabolic encephalopathy suspected d/t polypharmacy. Pt was found down at home with AMS. Pt seen for PT evaluation at this time. Pt requires min A for mobility and was able to complete STS to FWW. Pt unable to achieve foot clearance to initiate stepping, and session limited d/t HR incr to 130-140s with sitting EOB and STS attempt. Currently recommend postacute placement, pt reports independent PTA and appears below her functional baseline. Will follow.       Plan  Recommend Physical Therapy 4 times per week until therapy goals are met for the following treatments:  Bed Mobility, Gait Training, Neuro Re-Education / Balance, Self Care/Home Evaluation, Stair Training, Therapeutic Activities and Therapeutic Exercises  DC Equipment Recommendations: Unable to determine at this time  Discharge Recommendations: Recommend post-acute placement for additional physical therapy services prior to discharge home        08/18/21 1536   Prior Living Situation   Prior Services Intermittent Physical Support for ADL Per Family   Housing / Facility 1 Story House   Steps Into Home 7   Steps In Home 0   Rail Both Rail (Steps into Home)   Bathroom Set up Bathtub / Shower Combination;Grab Bars   Equipment Owned Single Point Cane;Grab Bar(s) In Tub / Shower   Lives with -  Spouse   Comments pt reports lives with her spouse. spouse typically manages medications. pt uses her SPC sometimes. reports indep with ADLs and mobility.    Prior Level of Functional Mobility   Bed Mobility Independent   Transfer Status Independent   Ambulation Independent   Distance Ambulation (Feet) community distances   Assistive Devices Used occasionally uses SPC   Stairs Independent   Comments per pt report   Cognition     Level of Consciousness Alert   Comments pleasant and cooperative. is nervous and fearful but agreeable to trying to stand. pt reports wanting to return home, understands needs to mobilize more.    Balance Assessment   Sitting Balance (Static) Fair   Sitting Balance (Dynamic) Fair -   Standing Balance (Static) Poor +   Standing Balance (Dynamic) Poor   Weight Shift Sitting Fair   Weight Shift Standing Poor   Comments standing with FWW   Gait Analysis   Gait Level Of Assist Unable to Participate   Weight Bearing Status no restrictions   Comments attempted side steps along EOB, unable to achieve foot clearance   Bed Mobility    Supine to Sit Minimal Assist   Sit to Supine Minimal Assist   Scooting Minimal Assist   Rolling Minimal Assist to Rt.;Minimum Assist to Lt.   Comments incr time, cues for sequencing/initiation   Functional Mobility   Sit to Stand Minimal Assist   Short Term Goals    Short Term Goal # 1 pt will perform supine <> sit without bed features with SPV in 6 visits to be able to get in/out of bed at home   Short Term Goal # 2 pt will perform STS with SPV in 6 visits for improved independence   Short Term Goal # 3 pt will perform SPT with SPV in 6 visits for improved OOB mobility   Short Term Goal # 4 pt will ambulate 150ft with or without FWW with SPV in 6 visits to access home   Short Term Goal # 5 pt will negotiate 7 steps with B rails with SPV in 6 visits to access home

## 2021-08-18 NOTE — ASSESSMENT & PLAN NOTE
Patient denies any active chest pain or discomfort.  EKG with no significant ST changes.  Not ACS.  Troponin  trending down.

## 2021-08-18 NOTE — CARE PLAN
The patient is Stable - Low risk of patient condition declining or worsening    Progress made toward(s) clinical / shift goals:    Problem: Hemodynamics  Goal: Patient's hemodynamics, fluid balance and neurologic status will be stable or improve  Outcome: Progressing     Problem: Fluid Volume  Goal: Fluid volume balance will be maintained  Outcome: Progressing

## 2021-08-18 NOTE — HOSPITAL COURSE
This is a 61-year-old female with a past medical history of bipolar disorder, chronic low back and neck pain, depression, chronic narcotic use here with altered mental status.  She was found prone on the floor by her significant other with significant confusion and minimal responsiveness.  Medics on arrival did note an almost empty bottle of Flexeril that was prescribed 4 days prior.  On admission patient noted to have significant dehydration with lactic acidosis.  Also noted to have significant leukocytosis, although procalcitonin negative.  She underwent lumbar puncture with CSF analysis negative for infection.  UDS positive for benzodiazepines, phencyclidine, and methadone.  Suspect polypharmacy.  Hold home narcotics.  Continue IV hydration.

## 2021-08-18 NOTE — PROGRESS NOTES
4 Eyes Skin Assessment Completed by Jami WILKINSON RN and JESICA Price.    Head Bruising and Redness  Ears WDL  Nose WDL  Mouth WDL  Neck WDL  Breast/Chest Redness and Bruising  Shoulder Blades WDL  Spine WDL  (R) Arm/Elbow/Hand Redness and Bruising  (L) Arm/Elbow/Hand Redness and Bruising  Abdomen WDL  Groin WDL  Scrotum/Coccyx/Buttocks WDL  (R) Leg Redness, Bruising and Abrasion  (L) Leg Redness, Bruising and Abrasion  (R) Heel/Foot/Toe Redness  (L) Heel/Foot/Toe Redness          Devices In Places Pulse Ox, Wolf and Nasal Cannula      Interventions In Place Gray Ear Foams, Low Air Loss Mattress and Barrier Cream    Possible Skin Injury No    Pictures Uploaded Into Epic N/A  Wound Consult Placed N/A  RN Wound Prevention Protocol Ordered No

## 2021-08-18 NOTE — CARE PLAN
The patient is Watcher - Medium risk of patient condition declining or worsening    Shift Goals  Clinical Goals: control nausea  Patient Goals: nausea and pain control    Progress made toward(s) clinical / shift goals: pain control    Patient is not progressing towards the following goals: nausea control      Problem: Knowledge Deficit - Standard  Goal: Patient and family/care givers will demonstrate understanding of plan of care, disease process/condition, diagnostic tests and medications  Outcome: Progressing     Problem: Pain - Standard  Goal: Alleviation of pain or a reduction in pain to the patient’s comfort goal  Outcome: Progressing     Problem: Fall Risk  Goal: Patient will remain free from falls  Outcome: Progressing       Pt is alert and oriented x 4; is aware and understands plan of care; all questions have been answered at this time. Pt has been having some nausea and vomiting this morning; PRN nausea meds have been administered as prescribed to help control the nausea. Pt's emesis was brown; MD was notified of the pt's emesis and condition.

## 2021-08-18 NOTE — PROGRESS NOTES
Hospital Medicine Daily Progress Note    Date of Service  8/18/2021    Chief Complaint  Khalida Delaney is a 61 y.o. female admitted 8/17/2021 with altered mental status.    Hospital Course  This is a 61-year-old female with a past medical history of bipolar disorder, chronic low back and neck pain, depression, chronic narcotic use here with altered mental status.  She was found prone on the floor by her significant other with significant confusion and minimal responsiveness.  Medics on arrival did note an almost empty bottle of Flexeril that was prescribed 4 days prior.  On admission patient noted to have significant dehydration with lactic acidosis.  Also noted to have significant leukocytosis, although procalcitonin negative.  She underwent lumbar puncture with CSF analysis negative for infection.  UDS positive for benzodiazepines, phencyclidine, and methadone.  Suspect polypharmacy.  Hold home narcotics.  Continue IV hydration.      Interval Problem Update  8/18:  1/2 BC positive.  Suspect contamination.  Confusion still appears to be secondary to polypharmacy, especially Flexeril.  The patient spouse reports that he keeps her narcotics and benzodiazepines locked away, although she recently received her prescription for Flexeril which she was managing herself.  He did confirm her prescription being almost empty after recently having it filled.  Patient's mental status much improved after holding sedating medications.  She is A&O x 4.  She is complaining of generalized chronic pain.  She also notes generalized abdominal pain.  She did have an episode of vomiting this morning.  Continue to hold narcotics and muscle relaxers.  Advance diet as tolerated.  PT/OT to follow as patient appears to be severely weak and debilitated.    I have personally seen and examined the patient at bedside. I discussed the plan of care with patient and family.    Consultants/Specialty  None    Code Status  Full  Code    Disposition  Patient is not medically cleared.   Anticipate discharge to home with home health versus SNF.  PT/OT to follow.  I have placed the appropriate orders for post-discharge needs.    Review of Systems  Review of Systems   Constitutional: Positive for malaise/fatigue. Negative for chills and fever.   HENT: Negative for congestion and sore throat.    Eyes: Negative for blurred vision and double vision.   Respiratory: Negative for cough and shortness of breath.    Cardiovascular: Negative for chest pain, palpitations and leg swelling.   Gastrointestinal: Positive for abdominal pain, constipation, nausea and vomiting. Negative for diarrhea.   Genitourinary: Negative for dysuria.   Musculoskeletal: Positive for back pain, joint pain, myalgias (Generalized chronic pain. ) and neck pain.   Skin: Negative for itching and rash.   Neurological: Positive for weakness. Negative for dizziness, tingling, tremors and headaches.   Psychiatric/Behavioral: The patient is nervous/anxious.         Physical Exam  Temp:  [35.6 °C (96.1 °F)-37.7 °C (99.9 °F)] 37.7 °C (99.9 °F)  Pulse:  [] 93  Resp:  [14-28] 28  BP: (129-171)/(70-89) 129/72  SpO2:  [90 %-98 %] 94 %    Physical Exam  Vitals and nursing note reviewed.   Constitutional:       General: She is not in acute distress.     Appearance: Normal appearance. She is ill-appearing (Chronically ill-appearing. ).   HENT:      Head: Normocephalic and atraumatic.      Nose: Nose normal. No congestion.      Mouth/Throat:      Mouth: Mucous membranes are dry.      Pharynx: Oropharynx is clear.   Eyes:      General:         Right eye: No discharge.         Left eye: No discharge.      Conjunctiva/sclera: Conjunctivae normal.   Cardiovascular:      Rate and Rhythm: Normal rate and regular rhythm.      Pulses: Normal pulses.      Heart sounds: Normal heart sounds. No murmur heard.     Pulmonary:      Effort: Pulmonary effort is normal. No respiratory distress.      Breath  sounds: Normal breath sounds. No wheezing or rales.   Abdominal:      General: Bowel sounds are normal. There is no distension.      Palpations: Abdomen is soft.      Tenderness: There is abdominal tenderness.   Musculoskeletal:      Cervical back: Normal range of motion and neck supple. No rigidity. No muscular tenderness.      Right lower leg: No edema.      Left lower leg: No edema.      Comments: Generalized weakness.   Skin:     General: Skin is warm and dry.      Coloration: Skin is not jaundiced or pale.      Comments: Multiple abrasions on lower extremities.   Neurological:      General: No focal deficit present.      Mental Status: She is alert and oriented to person, place, and time.   Psychiatric:         Mood and Affect: Mood is anxious. Affect is tearful.         Fluids    Intake/Output Summary (Last 24 hours) at 8/18/2021 1303  Last data filed at 8/18/2021 0621  Gross per 24 hour   Intake 1000 ml   Output 1300 ml   Net -300 ml       Laboratory  Recent Labs     08/17/21  1328 08/18/21  0043   WBC 15.8* 13.2*   RBC 4.79 4.29   HEMOGLOBIN 15.4 13.9   HEMATOCRIT 41.9 37.4   MCV 87.5 87.2   MCH 32.2 32.4   MCHC 36.8* 37.2*   RDW 40.5 40.0   PLATELETCT 247 237   MPV 11.2 10.8     Recent Labs     08/17/21  1328 08/17/21  1521 08/18/21  0043   SODIUM 141 141 142   POTASSIUM 3.4* 3.5* 3.5*   CHLORIDE 105 106 106   CO2 15* 20 21   GLUCOSE 211* 132* 122*   BUN 16 14 9   CREATININE 0.96 0.75 0.57   CALCIUM 9.9 9.1 8.5     Recent Labs     08/17/21  1328   APTT 25.3   INR 1.07               Imaging  CT-CTA NECK WITH & W/O-POST PROCESSING   Final Result      CT angiogram of the neck within normal limits.      CT-CTA HEAD WITH & W/O-POST PROCESS   Final Result      1.  No thrombosis is seen within the [place] of [place].   2.  Fetal origin of the right posterior cerebral artery.      CT-CEREBRAL PERFUSION ANALYSIS   Final Result      1.  Cerebral blood flow less than 30% likely representing completed infarct = 0 mL.       2.  T Max more than 6 seconds likely representing combination of completed infarct and ischemia = 9 mL.      3.  Mismatched volume likely representing ischemic brain/penumbra = 9 mL      4.  Please note that the cerebral perfusion was performed on the limited brain tissue around the basal ganglia region. Infarct/ischemia outside the CT perfusion sections can be missed in this study.      CT-HEAD W/O   Final Result      1.  No acute intracranial abnormality is identified.   2.  Atrophy   3.  There are mild periventricular and subcortical white matter changes present.  This finding is nonspecific and could be from previous small vessel ischemia, demyelination, or gliosis.      CT-CSPINE WITHOUT PLUS RECONS   Final Result         1.  Negative CT scan of the cervical spine.  No fracture or subluxation.      2.  Mild degenerative changes of the cervical spine.      DX-CHEST-PORTABLE (1 VIEW)   Final Result      No acute cardiac or pulmonary abnormality is noted.           Assessment/Plan  * Acute metabolic encephalopathy  Assessment & Plan  Pt BIB EMS from home where she lives with her . Pt was last seen well last night before going to bed. Pt's  came home from work this afternoon and found pt prone on the ground and altered. Pt has a new prescription for Cyclobenzabrine with 6 out of 30 remaining. If taken appropriately there should be 18 remaining. Pt currently verbal and following commands but confused.      In the ED, work-up concerning for dehydration with a lactic acid, leukocytosis and tachycardia.  Extensive imaging of the head and neck in the ED unremarkable.  UDS positive for benzos, methadone and phencyclidine.  Procalcitonin negative.  Received a dose of vancomycin and Rocephin in the ED to cover meningitis. We will hold antibiotics for now.  Status post LP.  CSF analysis reveals mildly elevated glucose with no other significant abnormalities.  Suspect secondary to polypharmacy.  Avoid opioids  and sedatives for now.  Improving.       Generalized weakness  Assessment & Plan  PT/OT to follow.     Dehydration  Assessment & Plan  Continue IV hydration.    Elevated troponin  Assessment & Plan  Patient denies any active chest pain or discomfort.  EKG with no significant ST changes.  Not ACS.  Troponin  trending down.    Anxiety and depression  Assessment & Plan  Continue home BuSpar.  Hold Xanax for now.    Leukocytosis  Assessment & Plan  Likely reactive.  Cultures and CSF analysis negative for evidence of infection.  Leukocytosis improving.    Hypokalemia  Assessment & Plan  Monitor  Replete as needed.    Sepsis (HCC)  Assessment & Plan  This is Sepsis Present on admission  SIRS criteria identified on my evaluation include: Tachycardia, with heart rate greater than 90 BPM, Tachypnea, with respirations greater than 20 per minute and Leukocytosis, with WBC greater than 12,000  Source is unknown.  Sepsis protocol initiated  Fluid resuscitation ordered per protocol  IV antibiotics as appropriate for source of sepsis  While organ dysfunction may be noted elsewhere in this problem list or in the chart, degree of organ dysfunction does not meet CMS criteria for severe sepsis    Patient resuscitated in the emergency room.  1/2 BC positive, although suspect contamination.   8/18: Infectious work-up unremarkable.  Suspect polypharmacy.  Sepsis ruled out.    Tobacco dependence- (present on admission)  Assessment & Plan  Counseling provided at bedside for 20 mins    Chronic neck pain- (present on admission)  Assessment & Plan  Of chronic pain.  Recently diagnosed with acute right shoulder strain and prescribed opioids and muscle relaxers. Also on methadone for unknown reasons.  Now presenting with concern for polypharmacy.  Avoid opioids at this time.  Tylenol and ibuprofen as needed for pain control.       VTE prophylaxis: enoxaparin ppx    I have performed a physical exam and reviewed and updated ROS and Plan today  (8/18/2021). In review of yesterday's note (8/17/2021), there are no changes except as documented above.

## 2021-08-19 PROBLEM — R00.0 TACHYCARDIA: Status: ACTIVE | Noted: 2021-08-19

## 2021-08-19 LAB
ANION GAP SERPL CALC-SCNC: 7 MMOL/L (ref 7–16)
BACTERIA BLD CULT: ABNORMAL
BACTERIA BLD CULT: ABNORMAL
BACTERIA UR CULT: NORMAL
BASOPHILS # BLD AUTO: 0.4 % (ref 0–1.8)
BASOPHILS # BLD: 0.06 K/UL (ref 0–0.12)
BUN SERPL-MCNC: 34 MG/DL (ref 8–22)
CALCIUM SERPL-MCNC: 7.6 MG/DL (ref 8.5–10.5)
CHLORIDE SERPL-SCNC: 110 MMOL/L (ref 96–112)
CO2 SERPL-SCNC: 24 MMOL/L (ref 20–33)
CREAT SERPL-MCNC: 0.52 MG/DL (ref 0.5–1.4)
EKG IMPRESSION: NORMAL
EOSINOPHIL # BLD AUTO: 0.07 K/UL (ref 0–0.51)
EOSINOPHIL NFR BLD: 0.5 % (ref 0–6.9)
ERYTHROCYTE [DISTWIDTH] IN BLOOD BY AUTOMATED COUNT: 42.6 FL (ref 35.9–50)
GLUCOSE SERPL-MCNC: 115 MG/DL (ref 65–99)
HCT VFR BLD AUTO: 25 % (ref 37–47)
HGB BLD-MCNC: 9.1 G/DL (ref 12–16)
IMM GRANULOCYTES # BLD AUTO: 0.12 K/UL (ref 0–0.11)
IMM GRANULOCYTES NFR BLD AUTO: 0.8 % (ref 0–0.9)
LYMPHOCYTES # BLD AUTO: 4.76 K/UL (ref 1–4.8)
LYMPHOCYTES NFR BLD: 32.7 % (ref 22–41)
MCH RBC QN AUTO: 32.6 PG (ref 27–33)
MCHC RBC AUTO-ENTMCNC: 36.4 G/DL (ref 33.6–35)
MCV RBC AUTO: 89.6 FL (ref 81.4–97.8)
MONOCYTES # BLD AUTO: 0.76 K/UL (ref 0–0.85)
MONOCYTES NFR BLD AUTO: 5.2 % (ref 0–13.4)
NEUTROPHILS # BLD AUTO: 8.79 K/UL (ref 2–7.15)
NEUTROPHILS NFR BLD: 60.4 % (ref 44–72)
NRBC # BLD AUTO: 0 K/UL
NRBC BLD-RTO: 0 /100 WBC
PLATELET # BLD AUTO: 259 K/UL (ref 164–446)
PMV BLD AUTO: 10.5 FL (ref 9–12.9)
POTASSIUM SERPL-SCNC: 3.7 MMOL/L (ref 3.6–5.5)
RBC # BLD AUTO: 2.79 M/UL (ref 4.2–5.4)
SIGNIFICANT IND 70042: ABNORMAL
SIGNIFICANT IND 70042: NORMAL
SITE SITE: ABNORMAL
SITE SITE: NORMAL
SODIUM SERPL-SCNC: 141 MMOL/L (ref 135–145)
SOURCE SOURCE: ABNORMAL
SOURCE SOURCE: NORMAL
WBC # BLD AUTO: 14.6 K/UL (ref 4.8–10.8)

## 2021-08-19 PROCEDURE — A9270 NON-COVERED ITEM OR SERVICE: HCPCS | Performed by: STUDENT IN AN ORGANIZED HEALTH CARE EDUCATION/TRAINING PROGRAM

## 2021-08-19 PROCEDURE — 700102 HCHG RX REV CODE 250 W/ 637 OVERRIDE(OP): Performed by: STUDENT IN AN ORGANIZED HEALTH CARE EDUCATION/TRAINING PROGRAM

## 2021-08-19 PROCEDURE — 97530 THERAPEUTIC ACTIVITIES: CPT | Mod: CQ

## 2021-08-19 PROCEDURE — 97116 GAIT TRAINING THERAPY: CPT | Mod: CQ

## 2021-08-19 PROCEDURE — 700102 HCHG RX REV CODE 250 W/ 637 OVERRIDE(OP): Performed by: NURSE PRACTITIONER

## 2021-08-19 PROCEDURE — 700101 HCHG RX REV CODE 250: Performed by: STUDENT IN AN ORGANIZED HEALTH CARE EDUCATION/TRAINING PROGRAM

## 2021-08-19 PROCEDURE — 80048 BASIC METABOLIC PNL TOTAL CA: CPT

## 2021-08-19 PROCEDURE — 93010 ELECTROCARDIOGRAM REPORT: CPT | Performed by: INTERNAL MEDICINE

## 2021-08-19 PROCEDURE — 85025 COMPLETE CBC W/AUTO DIFF WBC: CPT

## 2021-08-19 PROCEDURE — 99233 SBSQ HOSP IP/OBS HIGH 50: CPT | Performed by: NURSE PRACTITIONER

## 2021-08-19 PROCEDURE — 770004 HCHG ROOM/CARE - ONCOLOGY PRIVATE *

## 2021-08-19 PROCEDURE — 700111 HCHG RX REV CODE 636 W/ 250 OVERRIDE (IP): Performed by: STUDENT IN AN ORGANIZED HEALTH CARE EDUCATION/TRAINING PROGRAM

## 2021-08-19 PROCEDURE — 36415 COLL VENOUS BLD VENIPUNCTURE: CPT

## 2021-08-19 PROCEDURE — A9270 NON-COVERED ITEM OR SERVICE: HCPCS | Performed by: NURSE PRACTITIONER

## 2021-08-19 RX ADMIN — MEMANTINE HYDROCHLORIDE 10 MG: 10 TABLET ORAL at 05:38

## 2021-08-19 RX ADMIN — BUSPIRONE HYDROCHLORIDE 30 MG: 10 TABLET ORAL at 05:38

## 2021-08-19 RX ADMIN — POTASSIUM CHLORIDE AND SODIUM CHLORIDE: 900; 150 INJECTION, SOLUTION INTRAVENOUS at 16:10

## 2021-08-19 RX ADMIN — DOCUSATE SODIUM 50 MG AND SENNOSIDES 8.6 MG 2 TABLET: 8.6; 5 TABLET, FILM COATED ORAL at 16:59

## 2021-08-19 RX ADMIN — LAMOTRIGINE 300 MG: 100 TABLET ORAL at 21:18

## 2021-08-19 RX ADMIN — MEMANTINE HYDROCHLORIDE 10 MG: 10 TABLET ORAL at 16:59

## 2021-08-19 RX ADMIN — POTASSIUM CHLORIDE AND SODIUM CHLORIDE: 900; 150 INJECTION, SOLUTION INTRAVENOUS at 05:39

## 2021-08-19 RX ADMIN — METHADONE HYDROCHLORIDE 30 MG: 10 TABLET ORAL at 16:59

## 2021-08-19 RX ADMIN — NICOTINE TRANSDERMAL SYSTEM 21 MG: 21 PATCH, EXTENDED RELEASE TRANSDERMAL at 05:39

## 2021-08-19 RX ADMIN — ZIPRASIDONE HYDROCHLORIDE 160 MG: 80 CAPSULE ORAL at 21:19

## 2021-08-19 RX ADMIN — BUSPIRONE HYDROCHLORIDE 30 MG: 10 TABLET ORAL at 16:59

## 2021-08-19 RX ADMIN — METHADONE HYDROCHLORIDE 30 MG: 10 TABLET ORAL at 05:38

## 2021-08-19 RX ADMIN — DOCUSATE SODIUM 50 MG AND SENNOSIDES 8.6 MG 2 TABLET: 8.6; 5 TABLET, FILM COATED ORAL at 05:38

## 2021-08-19 ASSESSMENT — ENCOUNTER SYMPTOMS
NERVOUS/ANXIOUS: 0
CONSTIPATION: 0
DIARRHEA: 0
BACK PAIN: 1
MYALGIAS: 1
TINGLING: 0
NAUSEA: 0
VOMITING: 0
PALPITATIONS: 0
BLURRED VISION: 0
NECK PAIN: 1
DOUBLE VISION: 0
TREMORS: 0
FEVER: 0
HEADACHES: 0
WEAKNESS: 1
COUGH: 0
SHORTNESS OF BREATH: 0
DIZZINESS: 0
ABDOMINAL PAIN: 0

## 2021-08-19 ASSESSMENT — COGNITIVE AND FUNCTIONAL STATUS - GENERAL
CLIMB 3 TO 5 STEPS WITH RAILING: A LOT
MOBILITY SCORE: 14
TURNING FROM BACK TO SIDE WHILE IN FLAT BAD: A LITTLE
MOVING TO AND FROM BED TO CHAIR: A LITTLE
SUGGESTED CMS G CODE MODIFIER MOBILITY: CL
MOVING FROM LYING ON BACK TO SITTING ON SIDE OF FLAT BED: A LOT
STANDING UP FROM CHAIR USING ARMS: A LOT
WALKING IN HOSPITAL ROOM: A LOT

## 2021-08-19 ASSESSMENT — PAIN DESCRIPTION - PAIN TYPE
TYPE: ACUTE PAIN
TYPE: ACUTE PAIN

## 2021-08-19 ASSESSMENT — GAIT ASSESSMENTS
GAIT LEVEL OF ASSIST: MINIMAL ASSIST
DISTANCE (FEET): 4
ASSISTIVE DEVICE: HAND HELD ASSIST

## 2021-08-19 NOTE — PROGRESS NOTES
Gregorio from Lab called with critical result of Hct 25 at 0854. Critical lab result read back to Gregorio.   Marc MAHARAJ notified of critical lab result at 1000.  Critical lab result read back by Marc MAHARAJ.

## 2021-08-19 NOTE — THERAPY
Physical Therapy   Daily Treatment     Patient Name: Khalida Delaney  Age:  61 y.o., Sex:  female  Medical Record #: [id number]  Today's Date: 8/19/2021     Precautions: Fall Risk    Assessment    Pt was pleasant and able to progress with therapy session today. She required Mathew to reach EOB and able to tolerate EOB without assist. As well performed multiple STS with HHA due to left arm pain. She was able to start with pre gait lateral weight shifting and lifting of heels with Mathew. Progressing to side stepping with Mathew and HHA, frequent seated rest breaks due to generalized fatigue. HR did elevate to high 120s with standing but recovered to 110s again after sitting. Encouraged to get to chair with nursing staff to build tolerance. Will continue to follow while in house.     Plan    Continue current treatment plan.    DC Equipment Recommendations: Unable to determine at this time  Discharge Recommendations: Recommend post-acute placement for additional physical therapy services prior to discharge home         08/19/21 0931   Other Treatments   Other Treatments Provided Educated on getting to chair for meals and seated exercises for strengthening on her own. Performed laq, seated marches, glute sets and ankle pumps    Balance   Sitting Balance (Static) Fair   Sitting Balance (Dynamic) Fair -   Standing Balance (Static) Fair -   Standing Balance (Dynamic) Poor   Weight Shift Sitting Fair   Weight Shift Standing Poor   Comments with HHA due to left arm pain   Gait Analysis   Gait Level Of Assist Minimal Assist   Assistive Device Hand Held Assist   Distance (Feet) 4  (side stepping only )   Skilled Intervention Verbal Cuing;Sequencing   Comments Started lateral weight shifting and pre gait training. She was able to take steps along side of bed with Mathew and HHA. no overt lob but HR increased to high 120s   Bed Mobility    Supine to Sit Minimal Assist   Sit to Supine Minimal Assist   Scooting Minimal Assist   Comments  extended time to complete with HOB elevated/. Vc for sequencing with seated scooting    Functional Mobility   Sit to Stand Minimal Assist   Skilled Intervention Verbal Cuing   Comments Mathew 3x with HHA     Short Term Goals    Short Term Goal # 1 pt will perform supine <> sit without bed features with SPV in 6 visits to be able to get in/out of bed at home   Goal Outcome # 1 goal not met   Short Term Goal # 2 pt will perform STS with SPV in 6 visits for improved independence   Goal Outcome # 2 Goal not met   Short Term Goal # 3 pt will perform SPT with SPV in 6 visits for improved OOB mobility   Goal Outcome # 3 Goal not met   Short Term Goal # 4 pt will ambulate 150ft with or without FWW with SPV in 6 visits to access home   Goal Outcome # 4 Goal not met   Short Term Goal # 5 pt will negotiate 7 steps with B rails with SPV in 6 visits to access home   Goal Outcome # 5 Goal not met

## 2021-08-19 NOTE — DISCHARGE PLANNING
Received Choice form at 1029  Agency/Facility Name:1)[facility],2)[facility],3)[facility]  Referral sent per Choice form at 0543

## 2021-08-19 NOTE — PROGRESS NOTES
Hospital Medicine Daily Progress Note    Date of Service  8/19/2021    Chief Complaint  Khalida Delaney is a 61 y.o. female admitted 8/17/2021 with altered mental status.    Hospital Course  This is a 61-year-old female with a past medical history of bipolar disorder, chronic low back and neck pain, depression, chronic narcotic use here with altered mental status.  She was found prone on the floor by her significant other with significant confusion and minimal responsiveness.  Medics on arrival did note an almost empty bottle of Flexeril that was prescribed 4 days prior.  On admission patient noted to have significant dehydration with lactic acidosis.  Also noted to have significant leukocytosis, although procalcitonin negative.  She underwent lumbar puncture with CSF analysis negative for infection.  UDS positive for benzodiazepines, phencyclidine, and methadone.  Suspect polypharmacy.  Hold home narcotics.  Continue IV hydration.      Interval Problem Update  8/18:  1/2 BC positive.  Suspect contamination.  Confusion still appears to be secondary to polypharmacy, especially Flexeril.  The patient spouse reports that he keeps her narcotics and benzodiazepines locked away, although she recently received her prescription for Flexeril which she was managing herself.  He did confirm her prescription being almost empty after recently having it filled.  Patient's mental status much improved after holding sedating medications.  She is A&O x 4.  She is complaining of generalized chronic pain.  She also notes generalized abdominal pain.  She did have an episode of vomiting this morning.  Continue to hold narcotics and muscle relaxers.  Advance diet as tolerated.  PT/OT to follow as patient appears to be severely weak and debilitated.   8/19:  Patient complained of chest pain overnight.  Troponins and ECG are stable.  Suspect related to chronic pain.  Also noted to be tachycardic.  Restart home methadone to prevent  withdrawals. Patient's mental status appears close to baseline.  She has had nausea, although this is improving.  She reports improvement of her pain after resuming home medications.  She is accepting to go to SNF.      I have personally seen and examined the patient at bedside. I discussed the plan of care with patient and family.    Consultants/Specialty  None    Code Status  Full Code    Disposition  Patient is not medically cleared.   Anticipate discharge to SNF.  CM following.   I have placed the appropriate orders for post-discharge needs.    Review of Systems  Review of Systems   Constitutional: Positive for malaise/fatigue. Negative for fever.   HENT: Negative for congestion.    Eyes: Negative for blurred vision and double vision.   Respiratory: Negative for cough and shortness of breath.    Cardiovascular: Negative for chest pain, palpitations and leg swelling.   Gastrointestinal: Negative for abdominal pain, constipation, diarrhea, nausea and vomiting.   Genitourinary: Negative for dysuria.   Musculoskeletal: Positive for back pain, joint pain, myalgias (Generalized chronic pain. ) and neck pain.   Skin: Negative for rash.   Neurological: Positive for weakness. Negative for dizziness, tingling, tremors and headaches.   Psychiatric/Behavioral: The patient is not nervous/anxious.         Physical Exam  Temp:  [36.2 °C (97.1 °F)-37.7 °C (99.9 °F)] 36.2 °C (97.1 °F)  Pulse:  [] 95  Resp:  [16-28] 20  BP: ()/(52-72) 94/52  SpO2:  [90 %-98 %] 97 %    Physical Exam  Vitals and nursing note reviewed.   Constitutional:       General: She is not in acute distress.     Appearance: Normal appearance. She is ill-appearing (Chronically ill-appearing. ). She is not toxic-appearing.   HENT:      Head: Normocephalic and atraumatic.      Nose: Nose normal. No congestion.      Mouth/Throat:      Mouth: Mucous membranes are dry.      Pharynx: Oropharynx is clear. No oropharyngeal exudate.   Eyes:      General: No  scleral icterus.        Right eye: No discharge.         Left eye: No discharge.      Conjunctiva/sclera: Conjunctivae normal.   Cardiovascular:      Rate and Rhythm: Normal rate and regular rhythm.      Pulses: Normal pulses.      Heart sounds: Normal heart sounds. No murmur heard.     Pulmonary:      Effort: Pulmonary effort is normal. No respiratory distress.      Breath sounds: Normal breath sounds. No wheezing.   Abdominal:      General: Bowel sounds are normal. There is no distension.      Palpations: Abdomen is soft.      Tenderness: There is no abdominal tenderness. There is no guarding.   Musculoskeletal:      Cervical back: Normal range of motion and neck supple. No rigidity or tenderness. No muscular tenderness.      Right lower leg: No edema.      Left lower leg: No edema.      Comments: Generalized weakness.   Skin:     General: Skin is warm and dry.      Coloration: Skin is not jaundiced or pale.      Comments: Multiple abrasions on lower extremities.   Neurological:      General: No focal deficit present.      Mental Status: She is alert and oriented to person, place, and time.   Psychiatric:         Mood and Affect: Mood is anxious. Affect is tearful.         Fluids    Intake/Output Summary (Last 24 hours) at 8/19/2021 0832  Last data filed at 8/18/2021 1500  Gross per 24 hour   Intake --   Output 1250 ml   Net -1250 ml       Laboratory  Recent Labs     08/17/21  1328 08/18/21  0043   WBC 15.8* 13.2*   RBC 4.79 4.29   HEMOGLOBIN 15.4 13.9   HEMATOCRIT 41.9 37.4   MCV 87.5 87.2   MCH 32.2 32.4   MCHC 36.8* 37.2*   RDW 40.5 40.0   PLATELETCT 247 237   MPV 11.2 10.8     Recent Labs     08/17/21  1521 08/18/21  0043 08/19/21  0035   SODIUM 141 142 141   POTASSIUM 3.5* 3.5* 3.7   CHLORIDE 106 106 110   CO2 20 21 24   GLUCOSE 132* 122* 115*   BUN 14 9 34*   CREATININE 0.75 0.57 0.52   CALCIUM 9.1 8.5 7.6*     Recent Labs     08/17/21  1328   APTT 25.3   INR 1.07               Imaging  [id number] VIEW    Final Result      No evidence of bowel obstruction.   Possible constipation.                  CT-CTA NECK WITH & W/O-POST PROCESSING   Final Result      CT angiogram of the neck within normal limits.      CT-CTA HEAD WITH & W/O-POST PROCESS   Final Result      1.  No thrombosis is seen within the [place] of [place].   2.  Fetal origin of the right posterior cerebral artery.      CT-CEREBRAL PERFUSION ANALYSIS   Final Result      1.  Cerebral blood flow less than 30% likely representing completed infarct = 0 mL.      2.  T Max more than 6 seconds likely representing combination of completed infarct and ischemia = 9 mL.      3.  Mismatched volume likely representing ischemic brain/penumbra = 9 mL      4.  Please note that the cerebral perfusion was performed on the limited brain tissue around the basal ganglia region. Infarct/ischemia outside the CT perfusion sections can be missed in this study.      CT-HEAD W/O   Final Result      1.  No acute intracranial abnormality is identified.   2.  Atrophy   3.  There are mild periventricular and subcortical white matter changes present.  This finding is nonspecific and could be from previous small vessel ischemia, demyelination, or gliosis.      CT-CSPINE WITHOUT PLUS RECONS   Final Result         1.  Negative CT scan of the cervical spine.  No fracture or subluxation.      2.  Mild degenerative changes of the cervical spine.      DX-CHEST-PORTABLE (1 VIEW)   Final Result      No acute cardiac or pulmonary abnormality is noted.           Assessment/Plan  * Acute metabolic encephalopathy  Assessment & Plan  Pt BIB EMS from home where she lives with her . Pt was last seen well last night before going to bed. Pt's  came home from work this afternoon and found pt prone on the ground and altered. Pt has a new prescription for Cyclobenzabrine with 6 out of 30 remaining. If taken appropriately there should be 18 remaining. Pt currently verbal and following commands  but confused.      In the ED, work-up concerning for dehydration with a lactic acid, leukocytosis and tachycardia.  Extensive imaging of the head and neck in the ED unremarkable.  UDS positive for benzos, methadone and phencyclidine.  Procalcitonin negative.  Received a dose of vancomycin and Rocephin in the ED to cover meningitis. We will hold antibiotics for now.  Status post LP.  CSF analysis reveals mildly elevated glucose with no other significant abnormalities.  Suspect secondary to polypharmacy.  Avoid opioids and sedatives for now.  8/19:  Mental status appears to be close to baseline.  Restart home methadone to prevent withdrawals.       Tachycardia  Assessment & Plan  Mild troponin elevation with downward trend  EKG stable  Concern for narcotic withdrawal.  Restart home methadone.       Generalized weakness  Assessment & Plan  PT/OT recommending SNF  Referral placed.     Dehydration  Assessment & Plan  Continue IV hydration.    Elevated troponin  Assessment & Plan  Patient denies any active chest pain or discomfort.  EKG with no significant ST changes.  Not ACS.  Troponin  trending down.    Anxiety and depression  Assessment & Plan  Continue home BuSpar.  Hold Xanax for now.    Leukocytosis  Assessment & Plan  Likely reactive.  Cultures and CSF analysis negative for evidence of infection.  Leukocytosis improving.    Hypokalemia  Assessment & Plan  Monitor  Replete as needed.    Sepsis (HCC)  Assessment & Plan  This is Sepsis Present on admission  SIRS criteria identified on my evaluation include: Tachycardia, with heart rate greater than 90 BPM, Tachypnea, with respirations greater than 20 per minute and Leukocytosis, with WBC greater than 12,000  Source is unknown.  Sepsis protocol initiated  Fluid resuscitation ordered per protocol  IV antibiotics as appropriate for source of sepsis  While organ dysfunction may be noted elsewhere in this problem list or in the chart, degree of organ dysfunction does not  meet CMS criteria for severe sepsis    Patient resuscitated in the emergency room.  1/2 BC positive, although suspect contamination.   8/18: Infectious work-up unremarkable.  Suspect polypharmacy.  Sepsis ruled out.    Tobacco dependence- (present on admission)  Assessment & Plan  Counseling provided at bedside for 20 mins    Chronic neck pain- (present on admission)  Assessment & Plan  Of chronic pain.  Recently diagnosed with acute right shoulder strain and prescribed opioids and muscle relaxers. Also on methadone for unknown reasons.  Now presenting with concern for polypharmacy.  Avoid opioids at this time.  Tylenol and ibuprofen as needed for pain control.       VTE prophylaxis: enoxaparin ppx    I have performed a physical exam and reviewed and updated ROS and Plan today (8/19/2021). In review of yesterday's note (8/18/2021), there are no changes except as documented above.

## 2021-08-19 NOTE — CARE PLAN
The patient is Stable - Low risk of patient condition declining or worsening    Shift Goals  Clinical Goals: Control nausea  Patient Goals: Pain control    Progress made toward(s) clinical / shift goals: See note below regarding pain meds and nausea control    Problem: Pain - Standard  Goal: Alleviation of pain or a reduction in pain to the patient’s comfort goal  Outcome: Progressing  Note: Patient reports her pain being controlled by scheduled pain meds     Problem: Fall Risk  Goal: Patient will remain free from falls  Outcome: Progressing     Problem: Gastrointestinal Irritability  Goal: Nausea and vomiting will be absent or improve  Outcome: Progressing  Note: Patient reports her nausea being slightly better than earlier today, has not requested nausea medications so far this shift

## 2021-08-19 NOTE — THERAPY
Occupational Therapy   Initial Evaluation     Patient Name: Khalida Delaney  Age:  61 y.o., Sex:  female  Medical Record #: [id number]  Today's Date: 8/18/2021     Precautions  Precautions: Fall Risk    Assessment  Patient is 61 y.o. female presented to [facility], [place] after being found down at home by spouse and AMS, admitted with dx of acute metabolic encephalopathy likely d/t polypharmacy. Pt performed bed mobility and STS eob with min a w/ cues for sequencing and safety, once eob pt w/ increased HR in to 140's and upon return back to bed into 127, deferred further ADL assessment and activity participation, RN notified of HR response. Pt will benefit from acute OT services and post acute recommended at this time.     Plan    Recommend Occupational Therapy 4 times per week until therapy goals are met     DC Equipment Recommendations: Unable to determine at this time  Discharge Recommendations: Recommend post-acute placement for additional occupational therapy services prior to discharge home      Objective       08/18/21 1538   Prior Living Situation   Prior Services Intermittent Physical Support for ADL Per Family   Housing / Facility 1 Story House   Steps Into Home 7   Steps In Home 0   Bathroom Set up Bathtub / Shower Combination;Grab Bars   Equipment Owned Single Point Cane   Lives with - Patient's Self Care Capacity Spouse   Comments Reports I with ADls, spouse manages medications and most of the IADLs. Reports spoues can assist as needed   Prior Level of ADL Function   Self Feeding Independent   Grooming / Hygiene Independent   Bathing Independent   Dressing Independent   Toileting Independent   Prior Level of IADL Function   Medication Management Requires Assist   Laundry Requires Assist   Kitchen Mobility Requires Assist   Finances Requires Assist   Home Management Requires Assist   Shopping Requires Assist   Prior Level Of Mobility Independent Without Device in Home   Driving / Transportation Relatives / Others  Provide Transportation   Cognition    Cognition / Consciousness X   Level of Consciousness Alert   Comments Pleasant, increased time required to process directions, appears to have some nervousness regarding mobility but gives good effort.   Strength Upper Body   Upper Body Strength  X   Comments generalized weakness   Balance Assessment   Sitting Balance (Static) Fair   Sitting Balance (Dynamic) Fair -   Standing Balance (Static) Poor +   Standing Balance (Dynamic) Poor   Weight Shift Sitting Fair   Weight Shift Standing Poor   Comments w/FWW, sidestepping only d/t tachycardia   Bed Mobility    Supine to Sit Minimal Assist   Sit to Supine Minimal Assist   Scooting Minimal Assist   Rolling Minimal Assist to Rt.;Minimum Assist to Lt.   Comments cues for sequencing   ADL Assessment   Eating Supervision   Grooming Minimal Assist;Seated   Bathing   (discussed home s/u)   Upper Body Dressing Minimal Assist   Lower Body Dressing Maximal Assist   Toileting Maximal Assist   Functional Mobility   Sit to Stand Minimal Assist   Bed, Chair, Wheelchair Transfer Unable to Participate   Toilet Transfers Unable to Participate  (increased HR w/ activity)   Comments w/FWW   Short Term Goals   Short Term Goal # 1 Pt will perform LB dressing w/ min a and stable HR   Short Term Goal # 2 Pt will perform functional t/f's with min a and stable HR   Short Term Goal # 3 Pt will perform toileting task with min a and stable HR   Anticipated Discharge Equipment and Recommendations   DC Equipment Recommendations Unable to determine at this time

## 2021-08-19 NOTE — ASSESSMENT & PLAN NOTE
Mild troponin elevation with downward trend  EKG stable  Concern for narcotic withdrawal.  Restart home methadone.

## 2021-08-19 NOTE — DISCHARGE PLANNING
Anticipated Discharge Disposition: SNF    Action: Pt was discussed during IDT rounds, pt requires SNF, and orders were placed. LSW met with pt at bedside to collect assessment and choice. Pt was asleep and unable to wake. LSW will return.     Barriers to Discharge: SNF choice and acceptance    Plan: LSW to follow up in an hour or so     @1200  LSW met with pt at bedside, pt is agreeable to SNF. Choice collected for 1: [facility], 2: [facility] 3: [facility] @ 1414. Due to the nature of the admission, LSW asked if patient has any current thoughts of self harm or suicide. Pt reports no and it was an accident. LSW asked if she has any plan in place or suicidal ideation, pt reported no. She was very tired and did not elaborate.     @1530  LSW completed chart review and found no consult from psych, due to the nature of the admission LSW was curious if pt should be assessed by psych, LSW sent voalte to FOZIA Tran, awaiting reply. Pt did not disclose SI to this LSW but thought it should still be discussed for future patient safety.     Care Transition Team Assessment  This assessment was collected at bedside by pt. Pt reports living with boyfriend in 1 story house. Has been to SNF in the past 5 years ago when she broke her back. Pt reports being current with PCP. No AD, refused. Pt reports she was working with the exception of a few months before being admitted due to accident at work. Pt reports driving and having transportation.     Information Source  Orientation Level: Oriented X4  Information Given By: Patient  Who is responsible for making decisions for patient? : Patient    Readmission Evaluation  Is this a readmission?: No    Elopement Risk  Legal Hold: No  Ambulatory or Self Mobile in Wheelchair: No-Not an Elopement Risk  Elopement Risk: Not at Risk for Elopement    Interdisciplinary Discharge Planning  Lives with - Patient's Self Care Capacity: Significant Other  Support Systems: Spouse / Significant Other  Housing /  Facility: [address]  Do You Take your Prescribed Medications Regularly: Yes  Able to Return to Previous ADL's: No  Mobility Issues: Yes  Prior Services: Skilled Home Health Services, Aging Services  Patient Prefers to be Discharged to:: SNF    Discharge Preparedness  What is your plan after discharge?: Skilled nursing facility  What are your discharge supports?: Spouse  Prior Functional Level: Independent with Activities of Daily Living  Difficulity with ADLs: None  Difficulity with IADLs: None    Functional Assesment  Prior Functional Level: Independent with Activities of Daily Living    Finances  Financial Barriers to Discharge: No  Prescription Coverage: Yes    Advance Directive  Advance Directive?: None    Domestic Abuse  Have you ever been the victim of abuse or violence?: No    Discharge Risks or Barriers  Discharge risks or barriers?: No    Anticipated Discharge Information  Discharge Disposition: D/T to SNF with Medicare cert in anticipation of skilled care (03)  Discharge Contact Phone Number: [phone]

## 2021-08-19 NOTE — CARE PLAN
Problem: Knowledge Deficit - Standard  Goal: Patient and family/care givers will demonstrate understanding of plan of care, disease process/condition, diagnostic tests and medications  Outcome: Progressing     Problem: Gastrointestinal Irritability  Goal: Nausea and vomiting will be absent or improve  Outcome: Progressing     The patient is Stable - Low risk of patient condition declining or worsening    Shift Goals  Clinical Goals: advance diet  Patient Goals: no nausea    Progress made toward(s) clinical / shift goals:  able to advance to regular diet due to no nausea/vomiting. Will continue to monitor. Awaiting discharge.     Patient is not progressing towards the following goals:

## 2021-08-19 NOTE — DISCHARGE PLANNING
Spoke To: Mahendra  Agency/Facility Name: [facility]  Plan or Request: pt accepted (no cancer treatments)    Per Jaya hathaway

## 2021-08-20 VITALS
OXYGEN SATURATION: 95 % | DIASTOLIC BLOOD PRESSURE: 65 MMHG | BODY MASS INDEX: 25.87 KG/M2 | TEMPERATURE: 98.5 F | RESPIRATION RATE: 18 BRPM | HEIGHT: 63 IN | HEART RATE: 89 BPM | WEIGHT: 146 LBS | SYSTOLIC BLOOD PRESSURE: 140 MMHG

## 2021-08-20 PROBLEM — A41.9 SEPSIS (HCC): Status: RESOLVED | Noted: 2021-08-17 | Resolved: 2021-08-20

## 2021-08-20 PROBLEM — E87.6 HYPOKALEMIA: Status: RESOLVED | Noted: 2021-08-17 | Resolved: 2021-08-20

## 2021-08-20 PROBLEM — E86.0 DEHYDRATION: Status: RESOLVED | Noted: 2021-08-18 | Resolved: 2021-08-20

## 2021-08-20 PROBLEM — R79.89 ELEVATED TROPONIN: Status: RESOLVED | Noted: 2021-08-17 | Resolved: 2021-08-20

## 2021-08-20 PROBLEM — G93.41 ACUTE METABOLIC ENCEPHALOPATHY: Status: RESOLVED | Noted: 2021-08-17 | Resolved: 2021-08-20

## 2021-08-20 LAB
BACTERIA CSF CULT: NORMAL
BASOPHILS # BLD AUTO: 0.3 % (ref 0–1.8)
BASOPHILS # BLD: 0.03 K/UL (ref 0–0.12)
EOSINOPHIL # BLD AUTO: 0.1 K/UL (ref 0–0.51)
EOSINOPHIL NFR BLD: 0.9 % (ref 0–6.9)
ERYTHROCYTE [DISTWIDTH] IN BLOOD BY AUTOMATED COUNT: 41.4 FL (ref 35.9–50)
FERRITIN SERPL-MCNC: 429 NG/ML (ref 10–291)
GRAM STN SPEC: NORMAL
HCT VFR BLD AUTO: 19.6 % (ref 37–47)
HCT VFR BLD AUTO: 21.4 % (ref 37–47)
HEMOCCULT STL QL: POSITIVE
HGB BLD-MCNC: 7.2 G/DL (ref 12–16)
HGB BLD-MCNC: 7.6 G/DL (ref 12–16)
IMM GRANULOCYTES # BLD AUTO: 0.09 K/UL (ref 0–0.11)
IMM GRANULOCYTES NFR BLD AUTO: 0.8 % (ref 0–0.9)
IRON SATN MFR SERPL: 25 % (ref 15–55)
IRON SERPL-MCNC: 59 UG/DL (ref 40–170)
LYMPHOCYTES # BLD AUTO: 3.34 K/UL (ref 1–4.8)
LYMPHOCYTES NFR BLD: 28.6 % (ref 22–41)
MCH RBC QN AUTO: 32.9 PG (ref 27–33)
MCHC RBC AUTO-ENTMCNC: 36.7 G/DL (ref 33.6–35)
MCV RBC AUTO: 89.5 FL (ref 81.4–97.8)
MONOCYTES # BLD AUTO: 0.52 K/UL (ref 0–0.85)
MONOCYTES NFR BLD AUTO: 4.5 % (ref 0–13.4)
NEUTROPHILS # BLD AUTO: 7.6 K/UL (ref 2–7.15)
NEUTROPHILS NFR BLD: 64.9 % (ref 44–72)
NRBC # BLD AUTO: 0 K/UL
NRBC BLD-RTO: 0 /100 WBC
PLATELET # BLD AUTO: 180 K/UL (ref 164–446)
PMV BLD AUTO: 10.2 FL (ref 9–12.9)
RBC # BLD AUTO: 2.19 M/UL (ref 4.2–5.4)
SIGNIFICANT IND 70042: NORMAL
SITE SITE: NORMAL
SOURCE SOURCE: NORMAL
TIBC SERPL-MCNC: 237 UG/DL (ref 250–450)
UIBC SERPL-MCNC: 178 UG/DL (ref 110–370)
WBC # BLD AUTO: 11.7 K/UL (ref 4.8–10.8)

## 2021-08-20 PROCEDURE — 82728 ASSAY OF FERRITIN: CPT

## 2021-08-20 PROCEDURE — A9270 NON-COVERED ITEM OR SERVICE: HCPCS | Performed by: STUDENT IN AN ORGANIZED HEALTH CARE EDUCATION/TRAINING PROGRAM

## 2021-08-20 PROCEDURE — 83550 IRON BINDING TEST: CPT

## 2021-08-20 PROCEDURE — 85018 HEMOGLOBIN: CPT

## 2021-08-20 PROCEDURE — 83540 ASSAY OF IRON: CPT

## 2021-08-20 PROCEDURE — 700102 HCHG RX REV CODE 250 W/ 637 OVERRIDE(OP): Performed by: STUDENT IN AN ORGANIZED HEALTH CARE EDUCATION/TRAINING PROGRAM

## 2021-08-20 PROCEDURE — 85014 HEMATOCRIT: CPT

## 2021-08-20 PROCEDURE — 85025 COMPLETE CBC W/AUTO DIFF WBC: CPT

## 2021-08-20 PROCEDURE — 99239 HOSP IP/OBS DSCHRG MGMT >30: CPT | Performed by: INTERNAL MEDICINE

## 2021-08-20 PROCEDURE — 700102 HCHG RX REV CODE 250 W/ 637 OVERRIDE(OP): Performed by: NURSE PRACTITIONER

## 2021-08-20 PROCEDURE — 36415 COLL VENOUS BLD VENIPUNCTURE: CPT

## 2021-08-20 PROCEDURE — A9270 NON-COVERED ITEM OR SERVICE: HCPCS | Performed by: NURSE PRACTITIONER

## 2021-08-20 PROCEDURE — 700101 HCHG RX REV CODE 250: Performed by: STUDENT IN AN ORGANIZED HEALTH CARE EDUCATION/TRAINING PROGRAM

## 2021-08-20 PROCEDURE — 82272 OCCULT BLD FECES 1-3 TESTS: CPT

## 2021-08-20 PROCEDURE — 700111 HCHG RX REV CODE 636 W/ 250 OVERRIDE (IP): Performed by: STUDENT IN AN ORGANIZED HEALTH CARE EDUCATION/TRAINING PROGRAM

## 2021-08-20 RX ORDER — FERROUS SULFATE 325(65) MG
325 TABLET ORAL DAILY
Qty: 30 TABLET | Refills: 3 | Status: SHIPPED | OUTPATIENT
Start: 2021-08-20

## 2021-08-20 RX ORDER — OMEPRAZOLE 20 MG/1
20 CAPSULE, DELAYED RELEASE ORAL 2 TIMES DAILY
Status: DISCONTINUED | OUTPATIENT
Start: 2021-08-20 | End: 2021-08-20 | Stop reason: HOSPADM

## 2021-08-20 RX ADMIN — IBUPROFEN 800 MG: 800 TABLET, FILM COATED ORAL at 08:55

## 2021-08-20 RX ADMIN — NICOTINE TRANSDERMAL SYSTEM 21 MG: 21 PATCH, EXTENDED RELEASE TRANSDERMAL at 05:00

## 2021-08-20 RX ADMIN — ENOXAPARIN SODIUM 40 MG: 40 INJECTION SUBCUTANEOUS at 04:59

## 2021-08-20 RX ADMIN — POTASSIUM CHLORIDE AND SODIUM CHLORIDE: 900; 150 INJECTION, SOLUTION INTRAVENOUS at 02:16

## 2021-08-20 RX ADMIN — MEMANTINE HYDROCHLORIDE 10 MG: 10 TABLET ORAL at 04:59

## 2021-08-20 RX ADMIN — BUSPIRONE HYDROCHLORIDE 30 MG: 10 TABLET ORAL at 05:00

## 2021-08-20 RX ADMIN — METHADONE HYDROCHLORIDE 30 MG: 10 TABLET ORAL at 05:00

## 2021-08-20 RX ADMIN — DOCUSATE SODIUM 50 MG AND SENNOSIDES 8.6 MG 2 TABLET: 8.6; 5 TABLET, FILM COATED ORAL at 05:00

## 2021-08-20 ASSESSMENT — PAIN DESCRIPTION - PAIN TYPE
TYPE: ACUTE PAIN

## 2021-08-20 NOTE — DISCHARGE SUMMARY
Discharge Summary    CHIEF COMPLAINT ON ADMISSION  Chief Complaint   Patient presents with   • ALOC     Pt BIB EMS from home where she lives with her . Pt was last seen well last night before going to bed. Pt's  came home from work this afternoon and found pt prone on the ground and altered. Pt has a new prescription for Cyclobenzabrine with 6 out of 30 remaining. If taken appropriately there should be 18 remaining. Pt currently verbal and following commands but confused.        Reason for Admission  Escorted by Fire     Admission Date  8/17/2021    CODE STATUS  Full Code    HPI & HOSPITAL COURSE  This is a 61-year-old female with a past medical history of bipolar disorder, chronic low back and neck pain, depression, chronic narcotic use here with altered mental status.  She was found prone on the floor by her significant other with significant confusion and minimal responsiveness.  Medics on arrival did note an almost empty bottle of Flexeril that was prescribed 4 days prior.  On admission patient noted to have significant dehydration with lactic acidosis.  Also noted to have significant leukocytosis, although procalcitonin negative.  She underwent lumbar puncture with CSF analysis negative for infection.  UDS positive for benzodiazepines, phencyclidine, and methadone.  Suspect polypharmacy.  This was confirmed by her spouse who manages her medications.  He is typically responsible for administering her medications, however the patient was managing the Flexeril on her own and accidentally took too many.  She and her spouse confirm that this was an accident and was not intentional.  Narcotics were held and the patient's mental status returned to baseline. She is noticed to be significantly weak and debilitated.  Therapy recommends further rehab through skilled nursing facility.      Patient noticed to have anemia, although remains hemodynamically stable with no evidence of active bleed.  She has been  initiated on oral iron replacement.    The patient has been resumed on her chronic home medications and has tolerated this well.  Flexeril has been discontinued.  She is currently medically stable for transfer to skilled nursing.       Therefore, she is discharged in fair and stable condition to skilled nursing facility.    The patient met 2-midnight criteria for an inpatient stay at the time of discharge.    Discharge Date  8/20/21    FOLLOW UP ITEMS POST DISCHARGE  -Discontinue flexeril.  Further pain management per outpatient provider.  -Continue therapy per skilled nursing.     DISCHARGE DIAGNOSES  Principal Problem (Resolved):    Acute metabolic encephalopathy POA: Unknown  Active Problems:    Chronic neck pain POA: Yes    Tobacco dependence POA: Yes    Leukocytosis POA: Unknown    Anxiety and depression POA: Unknown    Generalized weakness POA: Unknown    Tachycardia POA: Unknown  Resolved Problems:    Sepsis (HCC) POA: Unknown    Hypokalemia POA: Unknown    Elevated troponin POA: Unknown    Dehydration POA: Unknown      FOLLOW UP  Future Appointments   Date Time Provider Department Center   9/1/2021  8:30 AM [facility]         MEDICATIONS ON DISCHARGE     Medication List      START taking these medications      Instructions   ferrous sulfate 325 (65 Fe) MG tablet   Take 1 Tablet by mouth every day.  Dose: 325 mg        CONTINUE taking these medications      Instructions   alprazolam 2 MG tablet  Commonly known as: XANAX   Take 2 mg by mouth 3 times a day.  Dose: 2 mg     baclofen 10 MG Tabs  Commonly known as: LIORESAL   Take 1 tablet by mouth 3 times a day.  Dose: 10 mg     busPIRone 30 MG tablet  Commonly known as: BUSPAR   Take  by mouth. Take 1 tablet by mouth two times a day as directed     ibuprofen 800 MG Tabs  Commonly known as: MOTRIN   Take 1 tablet by mouth every 8 hours as needed for Moderate Pain. Take with food  Dose: 800 mg     lamotrigine 150 MG tablet  Commonly known as:  LAMICTAL   Take 300 mg by mouth at bedtime.  Dose: 300 mg     memantine 10 MG Tabs  Commonly known as: Namenda   Take 10 mg by mouth 2 times a day.  Dose: 10 mg     methadone 10 MG Tabs  Commonly known as: DOLOPHINE   Take 30 mg by mouth 2 times a day.  Dose: 30 mg     ziprasidone 80 MG Caps  Commonly known as: GEODON   Take 160 mg by mouth every bedtime.  Dose: 160 mg        STOP taking these medications    cyclobenzaprine 10 mg Tabs  Commonly known as: Flexeril            Allergies  Allergies   Allergen Reactions   • Nkda [No Known Drug Allergy]        DIET  Orders Placed This Encounter   Procedures   • Diet Order Diet: Level 5 - Minced and Moist; Liquid level: Level 0 - Thin     Standing Status:   Standing     Number of Occurrences:   1     Order Specific Question:   Diet:     Answer:   Level 5 - Minced and Moist [24]     Order Specific Question:   Liquid level     Answer:   Level 0 - Thin       ACTIVITY  As tolerated and directed by skilled nursing.  Weight bearing as tolerated    LABORATORY  Lab Results   Component Value Date    SODIUM 141 08/19/2021    POTASSIUM 3.7 08/19/2021    CHLORIDE 110 08/19/2021    CO2 24 08/19/2021    GLUCOSE 115 (H) 08/19/2021    BUN 34 (H) 08/19/2021    CREATININE 0.52 08/19/2021        Lab Results   Component Value Date    WBC 11.7 (H) 08/20/2021    HEMOGLOBIN 7.6 (L) 08/20/2021    HEMATOCRIT 21.4 (L) 08/20/2021    PLATELETCT 180 08/20/2021        Total time of the discharge process was 35 minutes.

## 2021-08-20 NOTE — DISCHARGE PLANNING
Anticipated Discharge Disposition: SNF: [facility]    Action: Pt is medically clear to discharge to SNF. [facility] can accept pt this afternoon. LSW sent transport forms to [facility], requesting [facility] @ 1600. Confirmed with [facility] that transport able to take pt then. [facility] signed by pt, pt's spouse notified, and Mahendra @ [facility] notified.    Barriers to Discharge: None    Plan: LSW to assist as needed

## 2021-08-20 NOTE — DISCHARGE INSTRUCTIONS
Discharge Instructions    Discharged to other by ambulance with escort. Discharged via ambulance, hospital escort: Yes.  Special equipment needed: Not Applicable    Be sure to schedule a follow-up appointment with your primary care doctor or any specialists as instructed.     Discharge Plan:   Diet Plan: Discussed  Activity Level: Discussed  Confirmed Follow up Appointment: Patient to Call and Schedule Appointment  Confirmed Symptoms Management: Discussed  Medication Reconciliation Updated: Yes    I understand that a diet low in cholesterol, fat, and sodium is recommended for good health. Unless I have been given specific instructions below for another diet, I accept this instruction as my diet prescription.   Other diet: Mince and moist    Special Instructions: None    · Is patient discharged on Warfarin / Coumadin?   No     Depression / Suicide Risk    As you are discharged from this [facility] facility, it is important to learn how to keep safe from harming yourself.    Recognize the warning signs:  · Abrupt changes in personality, positive or negative- including increase in energy   · Giving away possessions  · Change in eating patterns- significant weight changes-  positive or negative  · Change in sleeping patterns- unable to sleep or sleeping all the time   · Unwillingness or inability to communicate  · Depression  · Unusual sadness, discouragement and loneliness  · Talk of wanting to die  · Neglect of personal appearance   · Rebelliousness- reckless behavior  · Withdrawal from people/activities they love  · Confusion- inability to concentrate     If you or a loved one observes any of these behaviors or has concerns about self-harm, here's what you can do:  · Talk about it- your feelings and reasons for harming yourself  · Remove any means that you might use to hurt yourself (examples: pills, rope, extension cords, firearm)  · Get professional help from the community (Mental Health, Substance Abuse,  psychological counseling)  · Do not be alone:Call your Safe Contact- someone whom you trust who will be there for you.  · Call your local CRISIS HOTLINE 195-6597 or 327-025-4801  · Call your local Children's Mobile Crisis Response Team [place] (685) 243-7555 or www.[employer]  · Call the toll free National Suicide Prevention Hotlines   · National Suicide Prevention Lifeline 368-299-FBUT (9513)  · National Hope Line Network 800-SUICIDE (529-9624)

## 2021-08-20 NOTE — PROGRESS NOTES
Discharge instructions discussed. Questions answered. PIV d/c-ed. Pt off unit with [facility]. Report called to Sylvester at [facility].

## 2021-08-20 NOTE — CARE PLAN
The patient is Stable - Low risk of patient condition declining or worsening    Shift Goals  Clinical Goals: Hemodynamically stable  Patient Goals: Discharge    Progress made toward(s) clinical / shift goals:    Problem: Knowledge Deficit - Standard  Goal: Patient and family/care givers will demonstrate understanding of plan of care, disease process/condition, diagnostic tests and medications  Outcome: Progressing     Problem: Hemodynamics  Goal: Patient's hemodynamics, fluid balance and neurologic status will be stable or improve  Outcome: Progressing     Problem: Fluid Volume  Goal: Fluid volume balance will be maintained  Outcome: Progressing

## 2021-08-20 NOTE — CARE PLAN
The patient is Stable - Low risk of patient condition declining or worsening    Shift Goals  Clinical Goals: Pain control  Patient Goals: Sleep comfortably    Progress made toward(s) clinical / shift goals: Patient reports that her pain is controlled by her Q12 scheduled methadone. See note below regarding N/V. Patient has been sleeping comfortably for most of the shift    Problem: Knowledge Deficit - Standard  Goal: Patient and family/care givers will demonstrate understanding of plan of care, disease process/condition, diagnostic tests and medications  Outcome: Progressing     Problem: Fall Risk  Goal: Patient will remain free from falls  Outcome: Progressing     Problem: Gastrointestinal Irritability  Goal: Nausea and vomiting will be absent or improve  Outcome: Progressing  Note: Patient reports being less nauseous today than she was yesterday, is tolerating Level 5 M/M diet

## 2021-08-20 NOTE — DISCHARGE PLANNING
Spoke To: Mahendra  Agency/Facility Name: [facility]  Plan or Request: available bed today    Karie Rowe notified

## 2021-08-22 LAB
BACTERIA BLD CULT: NORMAL
SIGNIFICANT IND 70042: NORMAL
SITE SITE: NORMAL
SOURCE SOURCE: NORMAL

## 2021-08-23 NOTE — DISCHARGE PLANNING
Received Transport Form @ 14:18pm  Spoke to Modesta LARA  Transport is scheduled for 08/20/21 @ 16:00 going to [facility].    Notified care team via Voalte of scheduled discharge.

## 2021-09-01 ENCOUNTER — OCCUPATIONAL MEDICINE (OUTPATIENT)
Dept: OCCUPATIONAL MEDICINE | Facility: CLINIC | Age: 61
End: 2021-09-01
Payer: MEDICARE

## 2021-09-01 ENCOUNTER — NON-PROVIDER VISIT (OUTPATIENT)
Dept: OCCUPATIONAL MEDICINE | Facility: CLINIC | Age: 61
End: 2021-09-01

## 2021-09-01 VITALS
BODY MASS INDEX: 25.87 KG/M2 | HEIGHT: 63 IN | OXYGEN SATURATION: 97 % | WEIGHT: 146 LBS | HEART RATE: 101 BPM | RESPIRATION RATE: 14 BRPM | TEMPERATURE: 98.7 F | DIASTOLIC BLOOD PRESSURE: 86 MMHG | SYSTOLIC BLOOD PRESSURE: 142 MMHG

## 2021-09-01 DIAGNOSIS — S40.022D CONTUSION OF LEFT UPPER EXTREMITY, SUBSEQUENT ENCOUNTER: ICD-10-CM

## 2021-09-01 DIAGNOSIS — Z02.83 ENCOUNTER FOR DRUG SCREENING: ICD-10-CM

## 2021-09-01 DIAGNOSIS — S46.912A STRAIN OF LEFT SHOULDER, INITIAL ENCOUNTER: ICD-10-CM

## 2021-09-01 PROCEDURE — 99213 OFFICE O/P EST LOW 20 MIN: CPT | Performed by: PREVENTIVE MEDICINE

## 2021-09-01 PROCEDURE — 8911 PR MRO FEE: Performed by: PREVENTIVE MEDICINE

## 2021-09-01 ASSESSMENT — FIBROSIS 4 INDEX: FIB4 SCORE: 2.39

## 2021-09-01 NOTE — PROGRESS NOTES
"Subjective:     Khalida Delaney is a 61 y.o. 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 GARRET: She was coming out of the kitchen, moved to go around a coworker and hit her left shoulder on the wall.  Pain was minimal and she finished her shift.  Woke up the next morning with more significant pain.  It is noted that patient was recently hospitalized for altered mental status likely from polypharmacy.  Per the discharge summary they believe from multiple medications on top of which Flexeril was added likely caused her symptoms.  She was discharged to a SNF and is set to be discharged from the SNF tomorrow.  They brought her here for her visit.  She states her shoulder pain has been improving but still has some significant pain near the elbow.  On the lateral side.  Denies numbness or tingling.  Has not received any notice that physical therapy has been approved.    ROS     Objective:     /86   Pulse (!) 101   Temp 37.1 °C (98.7 °F) (Temporal)   Resp 14   Ht 1.6 m (5' 3\")   Wt 66.2 kg (146 lb)   SpO2 97%   BMI 25.86 kg/m²     Constitutional: Patient is in no acute distress. Appears well-developed and well-nourished.   Cardiovascular: Normal rate.    Pulmonary/Chest: Effort normal. No respiratory distress.   Neurological: Patient is alert and oriented to person, place, and time.   Skin: Skin is warm and dry.   Psychiatric: Normal mood and affect. Behavior is normal.     Left shoulder/upper extremity: No gross deformity.  No tenderness over the shoulder.  Some mild tenderness over the distal humerus down to the lateral epicondyles.  Range of motion intact.  Slight weakness with elbow extension/flexion with some mild discomfort.  Wrist movements intact do not elicit any discomfort.    Assessment/Plan:       1. Strain of left shoulder, initial encounter    2. Contusion of left upper extremity, subsequent " encounter    Released to Restricted Duty FROM 9/1/2021 TO 9/22/2021    No lift/push/pull greater than 5 pounds.  No lifting above shoulder level.    Referral to physical therapy pending  Continue medications as prescribed by PCP, discontinue Flexeril  May use OTC creams/ointments/patches as needed  May use heat/ice as needed  Gentle range of motion exercises  Restricted duty  Follow-up 3 weeks     Differential diagnosis, natural history, supportive care, and indications for immediate follow-up discussed.    Approximately 25 minutes was spent in preparing for visit, obtaining history, exam and evaluation, patient counseling/education and post visit documentation/orders.

## 2021-09-21 ENCOUNTER — OCCUPATIONAL MEDICINE (OUTPATIENT)
Dept: OCCUPATIONAL MEDICINE | Facility: CLINIC | Age: 61
End: 2021-09-21
Payer: MEDICARE

## 2021-09-21 VITALS
HEIGHT: 63 IN | DIASTOLIC BLOOD PRESSURE: 70 MMHG | SYSTOLIC BLOOD PRESSURE: 142 MMHG | HEART RATE: 67 BPM | BODY MASS INDEX: 25.87 KG/M2 | WEIGHT: 146 LBS | OXYGEN SATURATION: 96 % | TEMPERATURE: 97.1 F

## 2021-09-21 DIAGNOSIS — S46.912A STRAIN OF LEFT SHOULDER, INITIAL ENCOUNTER: ICD-10-CM

## 2021-09-21 DIAGNOSIS — S40.022D CONTUSION OF LEFT UPPER EXTREMITY, SUBSEQUENT ENCOUNTER: ICD-10-CM

## 2021-09-21 PROCEDURE — 99212 OFFICE O/P EST SF 10 MIN: CPT | Performed by: PREVENTIVE MEDICINE

## 2021-09-21 ASSESSMENT — FIBROSIS 4 INDEX: FIB4 SCORE: 2.39

## 2021-09-21 NOTE — PROGRESS NOTES
"Subjective:     Khalida Delaney is a 61 y.o. 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 GARRET: She was coming out of the kitchen, moved to go around a coworker and hit her left shoulder on the wall.  Pain was minimal and she finished her shift.  Patient states pain is mostly in the left elbow with some numbness and tingling in the hand.  She received a letter couple days ago stating that her claim was denied.  She plans to appeal this decision.    ROS       Objective:     /70   Pulse 67   Temp 36.2 °C (97.1 °F)   Ht 1.6 m (5' 3\")   Wt 66.2 kg (146 lb)   SpO2 96%   BMI 25.86 kg/m²     Constitutional: Patient is in no acute distress. Appears well-developed and well-nourished.   Cardiovascular: Normal rate.    Pulmonary/Chest: Effort normal. No respiratory distress.   Neurological: Patient is alert and oriented to person, place, and time.   Skin: Skin is warm and dry.   Psychiatric: Normal mood and affect. Behavior is normal.     Left shoulder/upper extremity: No gross deformity.  No tenderness over the shoulder.  Some mild tenderness over the distal humerus down to the lateral epicondyles.  Range of motion diminished with extension significantly..  Global weakness with wrist range of motion and strength testing.       Assessment/Plan:       1. Contusion of left upper extremity, subsequent encounter    2. Strain of left shoulder, initial encounter    Released to Full Duty FROM 9/21/2021 TO       Recommend patient follow-up with primary care physician for further treatment while in appeals process  See separate letterhead for current work restrictions  Released from care  Follow-up if claim approved    Differential diagnosis, natural history, supportive care, and indications for immediate follow-up discussed.    Approximately 15 minutes was spent in preparing for visit, " obtaining history, exam and evaluation, patient counseling/education and post visit documentation/orders.

## 2021-09-21 NOTE — LETTER
September 21, 2021        Khalida Delaney has the following work restrictions due to nonindustrial injury.     No lift/push/pull greater than 5 pounds.  No lifting above shoulder level.     These restrictions are in place for 4 weeks, or until changed by primary care physician.            Facundo Hidalgo D.O.

## 2021-09-21 NOTE — LETTER
[address],   [address] [place] [address]  Phone:  [phone] - Fax:  [phone]   Occupational Health [facility] [place] Progress Report and Disability Certification  Date of Service: 9/21/2021   No Show:  No  Date / Time of Next Visit:  MMI   Claim Information   Patient Name: Khalida Delaney  Claim Number:     Employer: [employer]  Date of Injury:      Insurer / TPA: [employer]  ID / SSN:     Occupation:   Diagnosis: Diagnoses of Contusion of left upper extremity, subsequent encounter and Strain of left shoulder, initial encounter were pertinent to this visit.    Medical Information   Related to Industrial Injury?   Comments:Claim denied    Subjective Complaints:  DOI 8/9/2021: 60 yo injured worker presents with left shoulder injury. GARRET: She was coming out of the kitchen, moved to go around a coworker and hit her left shoulder on the wall.  Pain was minimal and she finished her shift.  Patient states pain is mostly in the left elbow with some numbness and tingling in the hand.  She received a letter couple days ago stating that her claim was denied.  She plans to appeal this decision.   Objective Findings: Left shoulder/upper extremity: No gross deformity.  No tenderness over the shoulder.  Some mild tenderness over the distal humerus down to the lateral epicondyles.  Range of motion diminished with extension significantly..  Global weakness with wrist range of motion and strength testing.      Pre-Existing Condition(s):     Assessment:   Condition Same    Status: Discharged /  MMI  Permanent Disability:No    Plan:      Diagnostics:      Comments:  Recommend patient follow-up with primary care physician for further treatment while in appeals process  See separate letterhead for current work restrictions  Released from care  Follow-up if claim approved    Disability Information   Status: Released to Full Duty    From:  9/21/2021  Through:    Restrictions are:     Physical Restrictions   Sitting:    Standing:    Stooping:    Bending:      Squatting:    Walking:    Climbing:    Pushing:      Pulling:    Other:    Reaching Above Shoulder (L):   Reaching Above Shoulder (R):       Reaching Below Shoulder (L):    Reaching Below Shoulder (R):      Not to exceed Weight Limits   Carrying(hrs):   Weight Limit(lb):   Lifting(hrs):   Weight  Limit(lb):     Comments:      Repetitive Actions   Hands: i.e. Fine Manipulations from Grasping:     Feet: i.e. Operating Foot Controls:     Driving / Operate Machinery:     Health Care Provider’s Original or Electronic Signature  Facundo Hidalgo D.O. Health Care Provider’s Original or Electronic Signature    Paul Moore MD         Clinic Name / Location: [address],   [address] [address] Clinic Phone Number: Dept: [phone]   Appointment Time: 9:15 Am Visit Start Time: 9:07 AM   Check-In Time:  9:04 Am Visit Discharge Time:  9:58am   Original-Treating Physician or Chiropractor    Page 2-Insurer/TPA    Page 3-Employer    Page 4-Employee

## 2021-09-23 ENCOUNTER — OFFICE VISIT (OUTPATIENT)
Dept: MEDICAL GROUP | Facility: MEDICAL CENTER | Age: 61
End: 2021-09-23
Attending: NURSE PRACTITIONER
Payer: MEDICARE

## 2021-09-23 ENCOUNTER — HOSPITAL ENCOUNTER (OUTPATIENT)
Dept: RADIOLOGY | Facility: MEDICAL CENTER | Age: 61
End: 2021-09-23
Attending: NURSE PRACTITIONER
Payer: MEDICARE

## 2021-09-23 VITALS
BODY MASS INDEX: 27.61 KG/M2 | OXYGEN SATURATION: 95 % | DIASTOLIC BLOOD PRESSURE: 78 MMHG | HEART RATE: 75 BPM | RESPIRATION RATE: 16 BRPM | SYSTOLIC BLOOD PRESSURE: 142 MMHG | TEMPERATURE: 97.5 F | HEIGHT: 63 IN | WEIGHT: 155.8 LBS

## 2021-09-23 DIAGNOSIS — M25.522 LEFT ELBOW PAIN: ICD-10-CM

## 2021-09-23 DIAGNOSIS — Z23 NEED FOR VACCINATION: ICD-10-CM

## 2021-09-23 PROCEDURE — 99212 OFFICE O/P EST SF 10 MIN: CPT | Mod: 25 | Performed by: NURSE PRACTITIONER

## 2021-09-23 PROCEDURE — 90686 IIV4 VACC NO PRSV 0.5 ML IM: CPT

## 2021-09-23 PROCEDURE — 73080 X-RAY EXAM OF ELBOW: CPT | Mod: LT

## 2021-09-23 PROCEDURE — 99213 OFFICE O/P EST LOW 20 MIN: CPT | Performed by: NURSE PRACTITIONER

## 2021-09-23 ASSESSMENT — ENCOUNTER SYMPTOMS
ABDOMINAL PAIN: 0
DIARRHEA: 0
CHILLS: 0
CONSTIPATION: 0
BLOOD IN STOOL: 0
FEVER: 0
SHORTNESS OF BREATH: 0
COUGH: 0
WHEEZING: 0
WEIGHT LOSS: 0
PALPITATIONS: 0

## 2021-09-23 ASSESSMENT — PATIENT HEALTH QUESTIONNAIRE - PHQ9
3. TROUBLE FALLING OR STAYING ASLEEP OR SLEEPING TOO MUCH: NOT AT ALL
2. FEELING DOWN, DEPRESSED, IRRITABLE, OR HOPELESS: NOT AT ALL
SUM OF ALL RESPONSES TO PHQ9 QUESTIONS 1 AND 2: 0
6. FEELING BAD ABOUT YOURSELF - OR THAT YOU ARE A FAILURE OR HAVE LET YOURSELF OR YOUR FAMILY DOWN: NOT AL ALL
7. TROUBLE CONCENTRATING ON THINGS, SUCH AS READING THE NEWSPAPER OR WATCHING TELEVISION: NOT AT ALL
8. MOVING OR SPEAKING SO SLOWLY THAT OTHER PEOPLE COULD HAVE NOTICED. OR THE OPPOSITE, BEING SO FIGETY OR RESTLESS THAT YOU HAVE BEEN MOVING AROUND A LOT MORE THAN USUAL: NOT AT ALL
SUM OF ALL RESPONSES TO PHQ QUESTIONS 1-9: 0
4. FEELING TIRED OR HAVING LITTLE ENERGY: NOT AT ALL
9. THOUGHTS THAT YOU WOULD BE BETTER OFF DEAD, OR OF HURTING YOURSELF: NOT AT ALL
5. POOR APPETITE OR OVEREATING: NOT AT ALL
1. LITTLE INTEREST OR PLEASURE IN DOING THINGS: NOT AT ALL

## 2021-09-23 ASSESSMENT — FIBROSIS 4 INDEX: FIB4 SCORE: 2.39

## 2021-09-23 NOTE — ASSESSMENT & PLAN NOTE
On 8/9/21 She was at work and was carrying a tray (as ) with her right arm,.  She came around a corner and another employee was coming towards her so she had to support the right arm with her left.  This caused her elbow to hit the wall and also jammed her shoulder.  She has a w/u for her shoulder- neg- pain has improved in her shoulder.  She filed a claim for workman's comp but was denied.  She now has severe left elbow pain, decreased ROM and numbness and tingling from mid humerus down.  She has weakness in the left hand.  She cannot grasp with the hand.  She has tried a heating pad, ice, and icy hot.  She has been trying loosen the joint with a 1 lb weight- nothing helps.

## 2021-09-23 NOTE — LETTER
September 23, 2021       Patient: Khalida Delaney   YOB: 1960   Date of Visit: 9/23/2021         To Whom It May Concern:    In my medical opinion, I recommend that Khalida Delaney not lift anything or use her left arm until further notice.     If you have any questions or concerns, please don't hesitate to call [phone]          Sincerely,          DENILSON Clark.P.R.N.  Electronically Signed

## 2021-09-23 NOTE — PROGRESS NOTES
No chief complaint on file.      Subjective:     HPI:   Khalida Delaney is a 61 y.o. female here to discuss the evaluation and management of:        Left elbow pain  On 8/9/21 She was at work and was carrying a tray (as ) with her right arm,.  She came around a corner and another employee was coming towards her so she had to support the right arm with her left.  This caused her elbow to hit the wall and also jammed her shoulder.  She has a w/u for her shoulder- neg- pain has improved in her shoulder.  She filed a claim for workman's comp but was denied.  She now has severe left elbow pain, decreased ROM and numbness and tingling from mid humerus down.  She has weakness in the left hand.  She cannot grasp with the hand.  She has tried a heating pad, ice, and icy hot.  She has been trying loosen the joint with a 1 lb weight- nothing helps.        ROS  Review of Systems   Constitutional: Negative for chills, fever, malaise/fatigue and weight loss.   Respiratory: Negative for cough, shortness of breath and wheezing.    Cardiovascular: Negative for chest pain, palpitations and leg swelling.   Gastrointestinal: Negative for abdominal pain, blood in stool, constipation and diarrhea.   Musculoskeletal: Positive for joint pain.         Allergies   Allergen Reactions   • Nkda [No Known Drug Allergy]        Current medicines (including changes today)  Current Outpatient Medications   Medication Sig Dispense Refill   • ferrous sulfate 325 (65 Fe) MG tablet Take 1 Tablet by mouth every day. 30 Tablet 3   • ibuprofen (MOTRIN) 800 MG Tab Take 1 tablet by mouth every 8 hours as needed for Moderate Pain. Take with food 90 tablet 5   • busPIRone (BUSPAR) 30 MG tablet Take  by mouth. Take 1 tablet by mouth two times a day as directed     • alprazolam (XANAX) 2 MG tablet Take 2 mg by mouth 3 times a day.     • methadone (DOLOPHINE) 10 MG Tab Take 30 mg by mouth 2 times a day.     • lamotrigine (LAMICTAL) 150 MG tablet Take  300 mg by mouth at bedtime.     • ziprasidone (GEODON) 80 MG Cap Take 160 mg by mouth every bedtime.  1     No current facility-administered medications for this visit.       Social History     Tobacco Use   • Smoking status: Current Every Day Smoker     Years: 35.00   • Smokeless tobacco: Never Used   • Tobacco comment: pt vapes   Vaping Use   • Vaping Use: Every day   • Substances: Nicotine   • Devices: Pre-filled pod   Substance Use Topics   • Alcohol use: No     Comment: used to drink heavily, quit 1998   • Drug use: No     Comment: 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 Quit in 2004       Patient Active Problem List    Diagnosis Date Noted   • Left elbow pain 09/23/2021   • Tachycardia 08/19/2021   • Generalized weakness 08/18/2021   • Leukocytosis 08/17/2021   • Anxiety and depression 08/17/2021   • Acute pain of left shoulder 08/10/2021   • Urinary incontinence 05/17/2021   • Right leg pain 01/06/2021   • Encounter to establish care 03/25/2020   • Elevated hemoglobin (HCC) 03/25/2020   • Positive FIT (fecal immunochemical test) 12/30/2018   • Lesion of right external ear 12/17/2018   • Fracture of distal end of radius 05/08/2018   • Assistance with transportation 05/08/2018   • Rib pain on left side 01/18/2018   • Overweight (BMI 25.0-29.9) 09/07/2017   • Melanosis coli 04/20/2017   • Family history of breast cancer in sister 11/02/2016   • Tobacco dependence 11/02/2016   • HTN (hypertension) 06/02/2016   • Opioid type dependence, continuous (HCC) 05/19/2016   • Osteopenia 01/22/2013   • Simple cyst of kidney right 06/01/2012   • Insomnia 01/06/2012   • Arthritis    • Chronic neck pain    • Shoulder pain, right    • Chronic low back pain    • Hep C w/o coma, chronic GENOTYPE 1A OR 1B        Family History   Problem Relation Age of Onset   • Cancer Sister 44        breast and cervical/ovarian   • Hypertension Mother    • Diabetes Mother    • Cancer Mother         bone/brain cancer   • No Known Problems  "Father    • Genitourinary () Problems Sister         fibroid tumors   • Stroke Maternal Grandmother    • Stroke Maternal Grandfather    • Heart Disease Maternal Grandfather    • Lung Disease Neg Hx    • Hyperlipidemia Neg Hx           Objective:     /78 (BP Location: Left arm, Patient Position: Sitting, BP Cuff Size: Adult)   Pulse 75   Temp 36.4 °C (97.5 °F) (Temporal)   Resp 16   Ht 1.6 m (5' 3\")   Wt 70.7 kg (155 lb 12.8 oz)   SpO2 95%  Body mass index is 27.6 kg/m².    Physical Exam:  Physical Exam  Constitutional:       General: She is not in acute distress.  HENT:      Head: Normocephalic.      Right Ear: Tympanic membrane and external ear normal.      Left Ear: Tympanic membrane and external ear normal.   Eyes:      Conjunctiva/sclera: Conjunctivae normal.      Pupils: Pupils are equal, round, and reactive to light.   Neck:      Trachea: No tracheal deviation.   Cardiovascular:      Rate and Rhythm: Normal rate and regular rhythm.      Heart sounds: Normal heart sounds.   Pulmonary:      Effort: Pulmonary effort is normal.      Breath sounds: Normal breath sounds.   Abdominal:      General: Bowel sounds are normal.      Palpations: Abdomen is soft.   Musculoskeletal:      Left elbow: No effusion. Decreased range of motion. Tenderness present in medial epicondyle, lateral epicondyle and olecranon process.      Cervical back: Normal range of motion and neck supple.      Comments: LUE-numbness and tingling from mid humerus through fingers.  Decreased sensation.  Strength 3/5.  Cap refill less than 3 seconds.  Temperature WDL   Lymphadenopathy:      Head:      Right side of head: No preauricular adenopathy.      Left side of head: No preauricular adenopathy.      Cervical: No cervical adenopathy.   Skin:     General: Skin is warm and dry.   Neurological:      Mental Status: She is alert and oriented to person, place, and time.      Cranial Nerves: Cranial nerves are intact.      Sensory: Sensation " is intact.      Gait: Gait is intact.   Psychiatric:         Mood and Affect: Affect normal.         Judgment: Judgment normal.     I have placed the below orders and discussed them with an approved delegating provider.  The MA is performing the below orders under the direction of Dr. Walsh.      Assessment and Plan:     The following treatment plan was discussed:    1. Left elbow pain  REFERRAL TO PHYSICAL THERAPY    DX-ELBOW-COMPLETE 3+ LEFT    REFERRAL TO ORTHOPEDICS  -Chronic problem, unstable.  As the patient's Worker's Comp. claim has been denied she would like to move forward with work-up through me.  The elbow has never been x-rayed therefore we will get an x-ray today.  I placed an urgent referral to orthopedics as I am concerned due to her neurological complaints.  If appropriate we will order an MRI.  Referral to physical therapy placed.  ER precautions reviewed with patient-patient verbalized understanding  Addendum-x-ray shows no acute fracture.  Considering her neurological involvement I will order an MRI.   2. Need for vaccination  INFLUENZA VACCINE QUAD INJ (PF)       Any change or worsening of signs or symptoms, patient encouraged to follow-up or report to emergency room for further evaluation. Patient verbalizes understanding and agrees.    Follow-Up: Return for TBD by results or sooner as needed.      PLEASE NOTE: This dictation was created using voice recognition software. I have made every reasonable attempt to correct obvious errors, but I expect that there are errors of grammar and possibly content that I did not discover before finalizing the note.

## 2021-09-23 NOTE — RESULT ENCOUNTER NOTE
Please call pt and let them know that I reviewed the x-ray of her elbow-there are no fractures.  I have placed an order for an MRI-I still think it would likely be faster for her to get in with orthopedics and go that route.  To schedule the MRI please have the patient call [phone].  Thank you

## 2021-10-05 ENCOUNTER — APPOINTMENT (OUTPATIENT)
Dept: RADIOLOGY | Facility: MEDICAL CENTER | Age: 61
End: 2021-10-05
Attending: NURSE PRACTITIONER
Payer: MEDICARE

## 2022-02-02 ENCOUNTER — HOSPITAL ENCOUNTER (OUTPATIENT)
Facility: MEDICAL CENTER | Age: 62
End: 2022-02-02
Attending: INTERNAL MEDICINE
Payer: MEDICARE

## 2022-02-02 ENCOUNTER — OFFICE VISIT (OUTPATIENT)
Dept: MEDICAL GROUP | Facility: MEDICAL CENTER | Age: 62
End: 2022-02-02
Attending: INTERNAL MEDICINE
Payer: MEDICARE

## 2022-02-02 VITALS
TEMPERATURE: 99.1 F | DIASTOLIC BLOOD PRESSURE: 82 MMHG | OXYGEN SATURATION: 92 % | HEIGHT: 63 IN | RESPIRATION RATE: 16 BRPM | SYSTOLIC BLOOD PRESSURE: 138 MMHG | WEIGHT: 159 LBS | HEART RATE: 78 BPM | BODY MASS INDEX: 28.17 KG/M2

## 2022-02-02 DIAGNOSIS — J06.9 VIRAL URI WITH COUGH: ICD-10-CM

## 2022-02-02 LAB
COVID ORDER STATUS COVID19: NORMAL
EXTERNAL QUALITY CONTROL: NORMAL
SARS-COV+SARS-COV-2 AG RESP QL IA.RAPID: NEGATIVE

## 2022-02-02 PROCEDURE — U0005 INFEC AGEN DETEC AMPLI PROBE: HCPCS

## 2022-02-02 PROCEDURE — 99213 OFFICE O/P EST LOW 20 MIN: CPT | Mod: CS | Performed by: INTERNAL MEDICINE

## 2022-02-02 PROCEDURE — 99213 OFFICE O/P EST LOW 20 MIN: CPT | Performed by: INTERNAL MEDICINE

## 2022-02-02 PROCEDURE — 87426 SARSCOV CORONAVIRUS AG IA: CPT | Mod: QW | Performed by: INTERNAL MEDICINE

## 2022-02-02 PROCEDURE — U0003 INFECTIOUS AGENT DETECTION BY NUCLEIC ACID (DNA OR RNA); SEVERE ACUTE RESPIRATORY SYNDROME CORONAVIRUS 2 (SARS-COV-2) (CORONAVIRUS DISEASE [COVID-19]), AMPLIFIED PROBE TECHNIQUE, MAKING USE OF HIGH THROUGHPUT TECHNOLOGIES AS DESCRIBED BY CMS-2020-01-R: HCPCS

## 2022-02-02 ASSESSMENT — FIBROSIS 4 INDEX: FIB4 SCORE: 2.39

## 2022-02-02 ASSESSMENT — PAIN SCALES - GENERAL: PAINLEVEL: 3=SLIGHT PAIN

## 2022-02-02 NOTE — PROGRESS NOTES
Subjective:   Khalida Delaney is a 61 y.o. female here today for runny nose, cough    Viral URI with cough  She reports that starting about a week ago she began to feel sick.  She had a runny nose, watery eyes, cough, diarrhea, and myalgias.  She felt bad for about 2 days and then felt better for 1 day and then started feeling bad again.  Her symptoms have been waxing and waning.  She did have a positive Covid contact at work.  She has not taken a home Covid test.  She has not used any over-the-counter medications.  Her most bothersome symptom currently is nasal congestion and rhinorrhea.  She denies fevers, chills, shortness of breath.         Current medicines (including changes today)  Current Outpatient Medications   Medication Sig Dispense Refill   • ferrous sulfate 325 (65 Fe) MG tablet Take 1 Tablet by mouth every day. 30 Tablet 3   • ibuprofen (MOTRIN) 800 MG Tab Take 1 tablet by mouth every 8 hours as needed for Moderate Pain. Take with food 90 tablet 5   • busPIRone (BUSPAR) 30 MG tablet Take  by mouth. Take 1 tablet by mouth two times a day as directed     • alprazolam (XANAX) 2 MG tablet Take 2 mg by mouth 3 times a day.     • methadone (DOLOPHINE) 10 MG Tab Take 30 mg by mouth 2 times a day.     • lamotrigine (LAMICTAL) 150 MG tablet Take 300 mg by mouth at bedtime.     • ziprasidone (GEODON) 80 MG Cap Take 160 mg by mouth every bedtime.  1     No current facility-administered medications for this visit.     She  has a past medical history of Arthritis, Back pain, Bipolar 2 disorder, Chronic LBP, Chronic neck pain, Depression, Hep C w/o coma, chronic (HCC), HTN (hypertension) (6/2/2016), Melanosis coli (4/20/2017), Shoulder pain, right, Simple cyst of kidney right (6/1/2012), and Tobacco dependence (11/2/2016).    ROS   Denies chest pain, shortness of breath  As above in HPI     Objective:     Vitals:    02/02/22 1336   BP: 138/82   Pulse: 78   Resp: 16   Temp: 37.3 °C (99.1 °F)   SpO2: 92%     Body  mass index is 28.17 kg/m².   Physical Exam:  Constitutional: Alert, no distress.  Skin: Warm, dry, good turgor, no rashes in visible areas.  Eye: Equal, round and reactive, conjunctiva clear, lids normal.  Respiratory: Unlabored respiratory effort  Psych: Alert and oriented x3, normal affect and mood.      Results and Imaging:   POC Covid test in clinic today was negative    Assessment and Plan:   The following treatment plan was discussed    1. Viral URI with cough  Supportive care advised.  With negative POCT Covid, we will send confirmatory PCR swab.  Discussed nasal spray such as Afrin, oral decongestant such as Sudafed, humidifier, OTC cough suppressant as needed.  If she is still feeling unwell or if her condition acutely worsens over the next week she will contact our office.  - SARS-CoV-2 PCR (24 hour In-House): Collect NP swab in VTM; Future  - POCT SARS-COV Antigen DORENE (Symptomatic Only)        Followup: Return if symptoms worsen or fail to improve.

## 2022-02-03 LAB
SARS-COV-2 RNA RESP QL NAA+PROBE: NOTDETECTED
SPECIMEN SOURCE: NORMAL

## 2022-02-04 ENCOUNTER — TELEPHONE (OUTPATIENT)
Dept: MEDICAL GROUP | Facility: MEDICAL CENTER | Age: 62
End: 2022-02-04

## 2022-02-04 NOTE — TELEPHONE ENCOUNTER
----- Message from Samantha Walsh M.D. sent at 2/3/2022  5:26 PM PST -----  These let patient know that her Covid test that we sent to the lab also came back negative which confirms that she does not have Covid.    Samantha Walsh M.D.

## 2022-02-04 NOTE — TELEPHONE ENCOUNTER
Phone Number Called: [phone] (home) [phone] (work)      Call outcome: Left detailed message for patient. Informed to call back with any additional questions.    Message: Asked that she call back if she had any questions.

## 2022-02-14 ENCOUNTER — OFFICE VISIT (OUTPATIENT)
Dept: MEDICAL GROUP | Facility: MEDICAL CENTER | Age: 62
End: 2022-02-14
Attending: FAMILY MEDICINE
Payer: MEDICARE

## 2022-02-14 VITALS
DIASTOLIC BLOOD PRESSURE: 80 MMHG | HEIGHT: 63 IN | BODY MASS INDEX: 27.78 KG/M2 | RESPIRATION RATE: 20 BRPM | HEART RATE: 68 BPM | TEMPERATURE: 97.3 F | OXYGEN SATURATION: 95 % | SYSTOLIC BLOOD PRESSURE: 142 MMHG | WEIGHT: 156.8 LBS

## 2022-02-14 DIAGNOSIS — J20.9 ACUTE BRONCHITIS, UNSPECIFIED ORGANISM: ICD-10-CM

## 2022-02-14 DIAGNOSIS — Z12.11 SCREENING FOR MALIGNANT NEOPLASM OF COLON: ICD-10-CM

## 2022-02-14 DIAGNOSIS — Z12.31 ENCOUNTER FOR SCREENING MAMMOGRAM FOR MALIGNANT NEOPLASM OF BREAST: ICD-10-CM

## 2022-02-14 PROCEDURE — 99213 OFFICE O/P EST LOW 20 MIN: CPT | Performed by: FAMILY MEDICINE

## 2022-02-14 RX ORDER — BENZONATATE 100 MG/1
100 CAPSULE ORAL 3 TIMES DAILY PRN
Qty: 40 CAPSULE | Refills: 0 | Status: SHIPPED | OUTPATIENT
Start: 2022-02-14

## 2022-02-14 RX ORDER — AZITHROMYCIN 250 MG/1
TABLET, FILM COATED ORAL
Qty: 6 TABLET | Refills: 0 | Status: SHIPPED | OUTPATIENT
Start: 2022-02-14 | End: 2022-04-13

## 2022-02-14 ASSESSMENT — FIBROSIS 4 INDEX: FIB4 SCORE: 2.39

## 2022-02-14 NOTE — PROGRESS NOTES
"Subjective     Khalida Delaney is a 61 y.o. female who presents with Cough (cough congestion, diarrhea X2 weeks, Covid negative 2 weeks ago)            HPI 1.  Persistent cough-patient reports over the past 3 weeks has had a persistent cough.  It is productive of green to yellow sputum.  She is smoking about 2 to 3 cigarettes/day.  She also notes copious nasal discharge which is yellow to green in color.  She has not noticed any fever.  She had a negative Covid test at her visit 2 weeks ago.  2.  History of positive FIT-patient had a positive FIT about 2 years ago.  The scheduled her for colonoscopy but due to poor venous access were unable to do it in outpatient setting.  Then Covid hit and nothing has progressed towards getting her colonoscopy done in possibly the hospital setting.  She is not reporting any bloody stools.  She does have colon cancer in a brother.  ROS           Objective     /80 (BP Location: Left arm, Patient Position: Sitting, BP Cuff Size: Adult)   Pulse 68   Temp 36.3 °C (97.3 °F) (Temporal)   Resp 20   Ht 1.6 m (5' 3\")   Wt 71.1 kg (156 lb 12.8 oz)   SpO2 95%   BMI 27.78 kg/m²      Physical Exam  General- alert,cooperative patient in no acute distress  Ears- normal tms without redness, perforation. Canals unremarkable  Nares- clear, pink, moist mucosa without bleeding. No purulent nasal DC  Orophx.- lips normal. Clear, pink, moist mucosa without redness or exudate. Tongue is midline  Chest-Normal to auscultation and percussion, movement is symmetric. Nontender to palpation.                          Assessment & Plan        1. Screening for malignant neoplasm of colon    - Referral to Gastroenterology    2. Acute bronchitis, unspecified organism    - azithromycin (ZITHROMAX) 250 MG Tab; 2 p.o. on day 1 then 1 p.o. daily  Dispense: 6 Tablet; Refill: 0  - benzonatate (TESSALON) 100 MG Cap; Take 1 Capsule by mouth 3 times a day as needed for Cough.  Dispense: 40 Capsule; Refill: " 0    Plan: 1.  Rx Zithromax x5 days  2.  Rx Tessalon Perles 100 to 200 mg up to 3 times daily to soothe cough  3.  GI referral for hospital-based colonoscopy sent   4.  Screening mammogram ordered

## 2022-03-01 ENCOUNTER — OFFICE VISIT (OUTPATIENT)
Dept: MEDICAL GROUP | Facility: MEDICAL CENTER | Age: 62
End: 2022-03-01
Attending: NURSE PRACTITIONER
Payer: MEDICARE

## 2022-03-01 VITALS
BODY MASS INDEX: 27.46 KG/M2 | SYSTOLIC BLOOD PRESSURE: 132 MMHG | HEIGHT: 63 IN | RESPIRATION RATE: 16 BRPM | DIASTOLIC BLOOD PRESSURE: 82 MMHG | WEIGHT: 155 LBS | OXYGEN SATURATION: 97 % | TEMPERATURE: 97.9 F | HEART RATE: 78 BPM

## 2022-03-01 DIAGNOSIS — H04.203 WATERY EYES: ICD-10-CM

## 2022-03-01 PROCEDURE — 99213 OFFICE O/P EST LOW 20 MIN: CPT | Performed by: NURSE PRACTITIONER

## 2022-03-01 RX ORDER — MINERAL OIL AND WHITE PETROLATUM 30; 940 MG/G; MG/G
1 OINTMENT OPHTHALMIC
Qty: 3.5 G | Refills: 0 | Status: SHIPPED | OUTPATIENT
Start: 2022-03-01

## 2022-03-01 ASSESSMENT — FIBROSIS 4 INDEX: FIB4 SCORE: 2.39

## 2022-03-01 NOTE — PATIENT INSTRUCTIONS
[facility]   Contact Our Offices  [phone]    [facility] - [address] [place] [address]  Phone: [phone]   Fax: [phone]    Call [phone], to schedule mammogram

## 2022-03-01 NOTE — PROGRESS NOTES
Chief Complaint   Patient presents with   • Eye Drainage       Subjective:     HPI:   Khalida Delaney is a 61 y.o. female here to discuss the evaluation and management of:      Problem   Watery Eyes    Patient here with complaint that for the last 6 months she seems to have constantly watering eyes, this is worse when staring at electronics or watching movies or being out in the sun night.  Patient states she is concerned because her mother had macular degeneration and she wants to make sure this is not that.         ROS  See HPI     Allergies   Allergen Reactions   • Nkda [No Known Drug Allergy]        Current medicines (including changes today)  Current Outpatient Medications   Medication Sig Dispense Refill   • White Petrolatum-Mineral Oil (SYSTANE NIGHTTIME) Ointment Apply 1 Application to affected eye(s) at bedtime. 3.5 g 0   • azithromycin (ZITHROMAX) 250 MG Tab 2 p.o. on day 1 then 1 p.o. daily 6 Tablet 0   • benzonatate (TESSALON) 100 MG Cap Take 1 Capsule by mouth 3 times a day as needed for Cough. 40 Capsule 0   • ferrous sulfate 325 (65 Fe) MG tablet Take 1 Tablet by mouth every day. 30 Tablet 3   • ibuprofen (MOTRIN) 800 MG Tab Take 1 tablet by mouth every 8 hours as needed for Moderate Pain. Take with food 90 tablet 5   • busPIRone (BUSPAR) 30 MG tablet Take  by mouth. Take 1 tablet by mouth two times a day as directed     • alprazolam (XANAX) 2 MG tablet Take 2 mg by mouth 3 times a day.     • methadone (DOLOPHINE) 10 MG Tab Take 30 mg by mouth 2 times a day.     • lamotrigine (LAMICTAL) 150 MG tablet Take 300 mg by mouth at bedtime.     • ziprasidone (GEODON) 80 MG Cap Take 160 mg by mouth every bedtime.  1     No current facility-administered medications for this visit.       Social History     Tobacco Use   • Smoking status: Current Every Day Smoker     Years: 35.00   • Smokeless tobacco: Never Used   • Tobacco comment: pt vapes   Vaping Use   • Vaping Use: Every day   • Substances: Nicotine   •  Devices: Pre-filled pod   Substance Use Topics   • Alcohol use: No     Comment: used to drink heavily, quit 1998   • Drug use: No     Comment: 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 Quit in 2004       Patient Active Problem List    Diagnosis Date Noted   • Watery eyes 03/01/2022   • Viral URI with cough 02/02/2022   • Left elbow pain 09/23/2021   • Tachycardia 08/19/2021   • Generalized weakness 08/18/2021   • Leukocytosis 08/17/2021   • Anxiety and depression 08/17/2021   • Acute pain of left shoulder 08/10/2021   • Urinary incontinence 05/17/2021   • Right leg pain 01/06/2021   • Encounter to establish care 03/25/2020   • Elevated hemoglobin (HCC) 03/25/2020   • Positive FIT (fecal immunochemical test) 12/30/2018   • Lesion of right external ear 12/17/2018   • Fracture of distal end of radius 05/08/2018   • Assistance with transportation 05/08/2018   • Rib pain on left side 01/18/2018   • Overweight (BMI 25.0-29.9) 09/07/2017   • Melanosis coli 04/20/2017   • Family history of breast cancer in sister 11/02/2016   • Tobacco dependence 11/02/2016   • HTN (hypertension) 06/02/2016   • Opioid type dependence, continuous (HCC) 05/19/2016   • Osteopenia 01/22/2013   • Simple cyst of kidney right 06/01/2012   • Insomnia 01/06/2012   • Arthritis    • Chronic neck pain    • Shoulder pain, right    • Chronic low back pain    • Hep C w/o coma, chronic GENOTYPE 1A OR 1B        Family History   Problem Relation Age of Onset   • Cancer Sister 44        breast and cervical/ovarian   • Hypertension Mother    • Diabetes Mother    • Cancer Mother         bone/brain cancer   • No Known Problems Father    • Genitourinary () Problems Sister         fibroid tumors   • Stroke Maternal Grandmother    • Stroke Maternal Grandfather    • Heart Disease Maternal Grandfather    • Lung Disease Neg Hx    • Hyperlipidemia Neg Hx           Objective:     /82 (BP Location: Left arm, Patient Position: Sitting, BP Cuff Size: Adult)    "Pulse 78   Temp 36.6 °C (97.9 °F) (Temporal)   Resp 16   Ht 1.6 m (5' 3\")   Wt 70.3 kg (155 lb)   SpO2 97%  Body mass index is 27.46 kg/m².    Physical Exam:  Physical Exam  Vitals reviewed.   Constitutional:       General: She is awake.      Appearance: Normal appearance. She is well-developed.   HENT:      Head: Normocephalic.      Nose: Nose normal.      Mouth/Throat:      Mouth: Mucous membranes are moist.      Pharynx: Oropharynx is clear. No oropharyngeal exudate.   Eyes:      Conjunctiva/sclera: Conjunctivae normal.      Comments: Some tearing noted    Cardiovascular:      Rate and Rhythm: Normal rate.   Pulmonary:      Effort: Pulmonary effort is normal. No respiratory distress.   Musculoskeletal:      Cervical back: Neck supple.   Skin:     General: Skin is warm and dry.   Neurological:      Mental Status: She is alert and oriented to person, place, and time.   Psychiatric:         Mood and Affect: Mood normal.         Behavior: Behavior normal. Behavior is cooperative.              Assessment and Plan:     The following treatment plan was discussed:    Problem List Items Addressed This Visit     Watery eyes     Chronic-  Likely secondary to dry eyes, discussed trying rewetting drops and ointment to help with moisture renewal in the eye  Advised patient follow-up with optometrist to have her vision and eyes evaluated formally, provided information and phone numbers to call           Relevant Medications    White Petrolatum-Mineral Oil (SYSTANE NIGHTTIME) Ointment          Any change or worsening of signs or symptoms, patient encouraged to follow-up or report to emergency room for further evaluation. Patient verbalizes understanding and agrees.    Follow-Up: No follow-ups on file.      PLEASE NOTE: This dictation was created using voice recognition software. I have made every reasonable attempt to correct obvious errors, but I expect that there are errors of grammar and possibly content that I did not " discover before finalizing the note.

## 2022-03-01 NOTE — ASSESSMENT & PLAN NOTE
Chronic-  Likely secondary to dry eyes, discussed trying rewetting drops and ointment to help with moisture renewal in the eye  Advised patient follow-up with optometrist to have her vision and eyes evaluated formally, provided information and phone numbers to call

## 2022-04-13 ENCOUNTER — HOSPITAL ENCOUNTER (EMERGENCY)
Facility: MEDICAL CENTER | Age: 62
End: 2022-04-13
Attending: EMERGENCY MEDICINE
Payer: MEDICARE

## 2022-04-13 VITALS
TEMPERATURE: 98 F | SYSTOLIC BLOOD PRESSURE: 118 MMHG | DIASTOLIC BLOOD PRESSURE: 58 MMHG | RESPIRATION RATE: 16 BRPM | WEIGHT: 140 LBS | HEIGHT: 63 IN | OXYGEN SATURATION: 96 % | HEART RATE: 88 BPM | BODY MASS INDEX: 24.8 KG/M2

## 2022-04-13 DIAGNOSIS — R11.2 NAUSEA VOMITING AND DIARRHEA: ICD-10-CM

## 2022-04-13 DIAGNOSIS — R19.7 NAUSEA VOMITING AND DIARRHEA: ICD-10-CM

## 2022-04-13 LAB
ABO GROUP BLD: NORMAL
ALBUMIN SERPL BCP-MCNC: 4.2 G/DL (ref 3.2–4.9)
ALBUMIN/GLOB SERPL: 1.7 G/DL
ALP SERPL-CCNC: 75 U/L (ref 30–99)
ALT SERPL-CCNC: 35 U/L (ref 2–50)
ANION GAP SERPL CALC-SCNC: 10 MMOL/L (ref 7–16)
APPEARANCE UR: CLEAR
APTT PPP: 26.5 SEC (ref 24.7–36)
AST SERPL-CCNC: 39 U/L (ref 12–45)
BACTERIA #/AREA URNS HPF: NEGATIVE /HPF
BASOPHILS # BLD AUTO: 0.3 % (ref 0–1.8)
BASOPHILS # BLD: 0.03 K/UL (ref 0–0.12)
BILIRUB SERPL-MCNC: 0.7 MG/DL (ref 0.1–1.5)
BILIRUB UR QL STRIP.AUTO: NEGATIVE
BLD GP AB SCN SERPL QL: NORMAL
BUN SERPL-MCNC: 37 MG/DL (ref 8–22)
CALCIUM SERPL-MCNC: 8.8 MG/DL (ref 8.5–10.5)
CHLORIDE SERPL-SCNC: 103 MMOL/L (ref 96–112)
CO2 SERPL-SCNC: 22 MMOL/L (ref 20–33)
COLOR UR: YELLOW
CREAT SERPL-MCNC: 0.65 MG/DL (ref 0.5–1.4)
EKG IMPRESSION: NORMAL
EOSINOPHIL # BLD AUTO: 0.01 K/UL (ref 0–0.51)
EOSINOPHIL NFR BLD: 0.1 % (ref 0–6.9)
EPI CELLS #/AREA URNS HPF: NORMAL /HPF
ERYTHROCYTE [DISTWIDTH] IN BLOOD BY AUTOMATED COUNT: 36.4 FL (ref 35.9–50)
GFR SERPLBLD CREATININE-BSD FMLA CKD-EPI: 99 ML/MIN/1.73 M 2
GLOBULIN SER CALC-MCNC: 2.5 G/DL (ref 1.9–3.5)
GLUCOSE SERPL-MCNC: 121 MG/DL (ref 65–99)
GLUCOSE UR STRIP.AUTO-MCNC: NEGATIVE MG/DL
HCT VFR BLD AUTO: 36.4 % (ref 37–47)
HGB BLD-MCNC: 13.3 G/DL (ref 12–16)
HYALINE CASTS #/AREA URNS LPF: NORMAL /LPF
IMM GRANULOCYTES # BLD AUTO: 0.07 K/UL (ref 0–0.11)
IMM GRANULOCYTES NFR BLD AUTO: 0.7 % (ref 0–0.9)
INR PPP: 1.13 (ref 0.87–1.13)
KETONES UR STRIP.AUTO-MCNC: ABNORMAL MG/DL
LEUKOCYTE ESTERASE UR QL STRIP.AUTO: ABNORMAL
LIPASE SERPL-CCNC: 28 U/L (ref 11–82)
LYMPHOCYTES # BLD AUTO: 2.96 K/UL (ref 1–4.8)
LYMPHOCYTES NFR BLD: 28.9 % (ref 22–41)
MCH RBC QN AUTO: 31.7 PG (ref 27–33)
MCHC RBC AUTO-ENTMCNC: 36.5 G/DL (ref 33.6–35)
MCV RBC AUTO: 86.9 FL (ref 81.4–97.8)
MICRO URNS: ABNORMAL
MONOCYTES # BLD AUTO: 0.54 K/UL (ref 0–0.85)
MONOCYTES NFR BLD AUTO: 5.3 % (ref 0–13.4)
NEUTROPHILS # BLD AUTO: 6.62 K/UL (ref 2–7.15)
NEUTROPHILS NFR BLD: 64.7 % (ref 44–72)
NITRITE UR QL STRIP.AUTO: NEGATIVE
NRBC # BLD AUTO: 0 K/UL
NRBC BLD-RTO: 0 /100 WBC
PH UR STRIP.AUTO: 5 [PH] (ref 5–8)
PLATELET # BLD AUTO: 289 K/UL (ref 164–446)
PMV BLD AUTO: 10.6 FL (ref 9–12.9)
POTASSIUM SERPL-SCNC: 4.5 MMOL/L (ref 3.6–5.5)
PROT SERPL-MCNC: 6.7 G/DL (ref 6–8.2)
PROT UR QL STRIP: NEGATIVE MG/DL
PROTHROMBIN TIME: 14.2 SEC (ref 12–14.6)
RBC # BLD AUTO: 4.19 M/UL (ref 4.2–5.4)
RBC # URNS HPF: NORMAL /HPF
RBC UR QL AUTO: ABNORMAL
RH BLD: NORMAL
SODIUM SERPL-SCNC: 135 MMOL/L (ref 135–145)
SP GR UR STRIP.AUTO: 1.03
UROBILINOGEN UR STRIP.AUTO-MCNC: 0.2 MG/DL
WBC # BLD AUTO: 10.2 K/UL (ref 4.8–10.8)
WBC #/AREA URNS HPF: NORMAL /HPF

## 2022-04-13 PROCEDURE — 85025 COMPLETE CBC W/AUTO DIFF WBC: CPT

## 2022-04-13 PROCEDURE — 86901 BLOOD TYPING SEROLOGIC RH(D): CPT

## 2022-04-13 PROCEDURE — 93005 ELECTROCARDIOGRAM TRACING: CPT | Performed by: EMERGENCY MEDICINE

## 2022-04-13 PROCEDURE — 86900 BLOOD TYPING SEROLOGIC ABO: CPT

## 2022-04-13 PROCEDURE — 85610 PROTHROMBIN TIME: CPT

## 2022-04-13 PROCEDURE — 81001 URINALYSIS AUTO W/SCOPE: CPT

## 2022-04-13 PROCEDURE — 99284 EMERGENCY DEPT VISIT MOD MDM: CPT

## 2022-04-13 PROCEDURE — 85730 THROMBOPLASTIN TIME PARTIAL: CPT

## 2022-04-13 PROCEDURE — 80053 COMPREHEN METABOLIC PANEL: CPT

## 2022-04-13 PROCEDURE — 86850 RBC ANTIBODY SCREEN: CPT

## 2022-04-13 PROCEDURE — 700105 HCHG RX REV CODE 258: Performed by: EMERGENCY MEDICINE

## 2022-04-13 PROCEDURE — 83690 ASSAY OF LIPASE: CPT

## 2022-04-13 PROCEDURE — 36415 COLL VENOUS BLD VENIPUNCTURE: CPT

## 2022-04-13 PROCEDURE — 93005 ELECTROCARDIOGRAM TRACING: CPT

## 2022-04-13 RX ORDER — PROMETHAZINE HYDROCHLORIDE 25 MG/1
25 SUPPOSITORY RECTAL EVERY 6 HOURS PRN
Qty: 5 SUPPOSITORY | Refills: 0 | Status: SHIPPED | OUTPATIENT
Start: 2022-04-13

## 2022-04-13 RX ORDER — PROMETHAZINE HYDROCHLORIDE 25 MG/1
25 TABLET ORAL EVERY 6 HOURS PRN
Qty: 15 TABLET | Refills: 0 | Status: SHIPPED | OUTPATIENT
Start: 2022-04-13

## 2022-04-13 RX ORDER — SODIUM CHLORIDE, SODIUM LACTATE, POTASSIUM CHLORIDE, CALCIUM CHLORIDE 600; 310; 30; 20 MG/100ML; MG/100ML; MG/100ML; MG/100ML
1000 INJECTION, SOLUTION INTRAVENOUS ONCE
Status: COMPLETED | OUTPATIENT
Start: 2022-04-13 | End: 2022-04-13

## 2022-04-13 RX ADMIN — SODIUM CHLORIDE, POTASSIUM CHLORIDE, SODIUM LACTATE AND CALCIUM CHLORIDE 1000 ML: 600; 310; 30; 20 INJECTION, SOLUTION INTRAVENOUS at 13:00

## 2022-04-13 ASSESSMENT — FIBROSIS 4 INDEX: FIB4 SCORE: 2.39

## 2022-04-13 NOTE — ED PROVIDER NOTES
"ED Provider Note    Scribed for Ino Short M.D. by Mary Saeed. 4/13/2022, 12:14 PM.    Primary care provider: MARTHA Clark  Means of arrival: EMS  History obtained from: Patient  History limited by: None    CHIEF COMPLAINT  Chief Complaint   Patient presents with    N/V    Diarrhea       HPI  Khalida Delaney is a 61 y.o. female who presents to the Emergency Department following multiple episodes of acute diarrhea onset 4-5 days ago, but worsening this morning. She states that initially, the diarrhea was \"lose and brown colored\", but this morning she was woken up out of her sleep with the need to make a bowel movement. She endured 3 episodes of \"black\" diarrhea with \"streaking red\" in one hour. She went back to bed and woke up having more episodes of diarrhea. She did not take any medication or Pepto Bismol today. She is also experiencin vomiting, nausea, and chills 2 days ago, but the vomiting has now subsided. She is currently endorsing associated weakness, fatigue, and abdominal discomfort. The patient's prior medical history is noted below, as well as her current medication regimen. She notes that she usually takes Jillian lax daily for constipation, but discontinued this treatment when her diarrhea began. She was a heroin user, but has been clean for 9 years. En route, patient was given Ammonium. She denies any recent antibiotic regimens.      REVIEW OF SYSTEMS  As above otherwise all other systems are negative    PAST MEDICAL HISTORY   has a past medical history of Arthritis, Back pain, Bipolar 2 disorder, Chronic LBP, Chronic neck pain, Depression, Hep C w/o coma, chronic (HCC), Hepatitis C, HTN (hypertension) (6/2/2016), Melanosis coli (4/20/2017), Shoulder pain, right, Simple cyst of kidney right (6/1/2012), and Tobacco dependence (11/2/2016).    SURGICAL HISTORY   has a past surgical history that includes primary c section and appendectomy.    SOCIAL HISTORY  Social History " "    Tobacco Use    Smoking status: Current Every Day Smoker     Years: 35.00    Smokeless tobacco: Never Used    Tobacco comment: pt vapes   Vaping Use    Vaping Use: Every day    Substances: Nicotine    Devices: Pre-filled pod   Substance Use Topics    Alcohol use: No     Comment: used to drink heavily, quit 1998    Drug use: No     Comment: 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 Quit in 2004      Social History     Substance and Sexual Activity   Drug Use No    Comment: 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 Quit in 2004       FAMILY HISTORY  Family History   Problem Relation Age of Onset    Cancer Sister 44        breast and cervical/ovarian    Hypertension Mother     Diabetes Mother     Cancer Mother         bone/brain cancer    No Known Problems Father     Genitourinary () Problems Sister         fibroid tumors    Stroke Maternal Grandmother     Stroke Maternal Grandfather     Heart Disease Maternal Grandfather     Lung Disease Neg Hx     Hyperlipidemia Neg Hx        CURRENT MEDICATIONS  Home Medications       Reviewed by Jazmyn Mccullough R.N. (Registered Nurse) on 04/13/22 at 1126  Med List Status: Complete     Medication Last Dose Status   alprazolam (XANAX) 2 MG tablet  Active   benzonatate (TESSALON) 100 MG Cap  Active   busPIRone (BUSPAR) 30 MG tablet  Active   ferrous sulfate 325 (65 Fe) MG tablet  Active   ibuprofen (MOTRIN) 800 MG Tab  Active   lamotrigine (LAMICTAL) 150 MG tablet  Active   memantine (NAMENDA) 5 MG Tab  Active   methadone (DOLOPHINE) 10 MG Tab  Active   White Petrolatum-Mineral Oil (SYSTANE NIGHTTIME) Ointment  Active   ziprasidone (GEODON) 80 MG Cap  Active                  ALLERGIES  Allergies   Allergen Reactions    Nkda [No Known Drug Allergy]        PHYSICAL EXAM  VITAL SIGNS: BP (!) 97/65   Pulse (!) 114 Comment: on EKG  Temp 35.9 °C (96.7 °F) (Temporal)   Resp 20   Ht 1.6 m (5' 3\")   Wt 63.5 kg (140 lb)   SpO2 99%   BMI 24.80 kg/m²   Constitutional: Well " developed, Well nourished, No acute distress, Non-toxic appearance.   HENT: Dry mucous membranes. Normocephalic, Atraumatic, Bilateral external ears normal, No oral exudates, Nose normal.   Eyes:conjunctiva is normal, there are no signs of exudate.   Neck: Supple, no meningeal signs.  Lymphatic: No lymphadenopathy noted.   Cardiovascular: Tachycardic rate and rhythm without murmurs gallops or rubs.   Thorax & Lungs: No respiratory distress. Breathing comfortably. Lungs are clear to auscultation bilaterally, there are no wheezes no rales. Chest wall is nontender.  Abdomen: Abdomen is mildly tender. Soft, nondistended. Bowel sounds are present.   Skin: Warm, Dry, No erythema,   Back: No tenderness, No CVA tenderness.  Musculoskeletal: Good range of motion in all major joints. No tenderness to palpation or major deformities noted. Intact distal pulses, no clubbing, no cyanosis, no edema,   Neurologic: Alert & oriented x 3, Moving all extremities. No gross abnormalities.    Psychiatric: Affect normal, Judgment normal, Mood normal.     LABS  Results for orders placed or performed during the hospital encounter of 04/13/22   CBC WITH DIFFERENTIAL   Result Value Ref Range    WBC 10.2 4.8 - 10.8 K/uL    RBC 4.19 (L) 4.20 - 5.40 M/uL    Hemoglobin 13.3 12.0 - 16.0 g/dL    Hematocrit 36.4 (L) 37.0 - 47.0 %    MCV 86.9 81.4 - 97.8 fL    MCH 31.7 27.0 - 33.0 pg    MCHC 36.5 (H) 33.6 - 35.0 g/dL    RDW 36.4 35.9 - 50.0 fL    Platelet Count 289 164 - 446 K/uL    MPV 10.6 9.0 - 12.9 fL    Neutrophils-Polys 64.70 44.00 - 72.00 %    Lymphocytes 28.90 22.00 - 41.00 %    Monocytes 5.30 0.00 - 13.40 %    Eosinophils 0.10 0.00 - 6.90 %    Basophils 0.30 0.00 - 1.80 %    Immature Granulocytes 0.70 0.00 - 0.90 %    Nucleated RBC 0.00 /100 WBC    Neutrophils (Absolute) 6.62 2.00 - 7.15 K/uL    Lymphs (Absolute) 2.96 1.00 - 4.80 K/uL    Monos (Absolute) 0.54 0.00 - 0.85 K/uL    Eos (Absolute) 0.01 0.00 - 0.51 K/uL    Baso (Absolute) 0.03  0.00 - 0.12 K/uL    Immature Granulocytes (abs) 0.07 0.00 - 0.11 K/uL    NRBC (Absolute) 0.00 K/uL   COMP METABOLIC PANEL   Result Value Ref Range    Sodium 135 135 - 145 mmol/L    Potassium 4.5 3.6 - 5.5 mmol/L    Chloride 103 96 - 112 mmol/L    Co2 22 20 - 33 mmol/L    Anion Gap 10.0 7.0 - 16.0    Glucose 121 (H) 65 - 99 mg/dL    Bun 37 (H) 8 - 22 mg/dL    Creatinine 0.65 0.50 - 1.40 mg/dL    Calcium 8.8 8.5 - 10.5 mg/dL    AST(SGOT) 39 12 - 45 U/L    ALT(SGPT) 35 2 - 50 U/L    Alkaline Phosphatase 75 30 - 99 U/L    Total Bilirubin 0.7 0.1 - 1.5 mg/dL    Albumin 4.2 3.2 - 4.9 g/dL    Total Protein 6.7 6.0 - 8.2 g/dL    Globulin 2.5 1.9 - 3.5 g/dL    A-G Ratio 1.7 g/dL   LIPASE   Result Value Ref Range    Lipase 28 11 - 82 U/L   URINALYSIS    Specimen: Urine, Clean Catch   Result Value Ref Range    Color Yellow     Character Clear     Specific Gravity 1.027 <1.035    Ph 5.0 5.0 - 8.0    Glucose Negative Negative mg/dL    Ketones Trace (A) Negative mg/dL    Protein Negative Negative mg/dL    Bilirubin Negative Negative    Urobilinogen, Urine 0.2 Negative    Nitrite Negative Negative    Leukocyte Esterase Trace (A) Negative    Occult Blood Trace (A) Negative    Micro Urine Req Microscopic    PROTHROMBIN TIME (INR)   Result Value Ref Range    PT 14.2 12.0 - 14.6 sec    INR 1.13 0.87 - 1.13   APTT   Result Value Ref Range    APTT 26.5 24.7 - 36.0 sec   COD (ADULT)   Result Value Ref Range    ABO Grouping Only B     Rh Grouping Only POS     Antibody Screen-Cod NEG    ESTIMATED GFR   Result Value Ref Range    GFR (CKD-EPI) 99 >60 mL/min/1.73 m 2   URINE MICROSCOPIC (W/UA)   Result Value Ref Range    WBC 0-2 /hpf    RBC 0-2 /hpf    Bacteria Negative None /hpf    Epithelial Cells Few /hpf    Hyaline Cast 0-2 /lpf   EKG   Result Value Ref Range    Report       [facility] Emergency Dept.    Test Date:  2022-04-13  Pt Name:    ANGELITO PAYNE                Department: ER  MRN:        [id number]                       Room:  Gender:     Female                       Technician: [id number]  :        1960                   Requested By:ER TRIAGE PROTOCOL  Order #:    [id number]                    Reading MD: DARNELL GARCIA MD    Measurements  Intervals                                Axis  Rate:       114                          P:          88  GA:         159                          QRS:        1  QRSD:       78                           T:          80  QT:         316  QTc:        436    Interpretive Statements  Sinus tachycardia  Compared to ECG 2021 17:33:10  Myocardial infarct finding no longer present  ST (T wave) deviation no longer present  no acute changes  Electronically Signed On 2022 16:05:39 PDT by DARNELL GARCIA MD     All labs reviewed by me.    EKG  Interpreted by me    COURSE & MEDICAL DECISION MAKING  Pertinent Labs & Imaging studies reviewed. (See chart for details)    12:14 PM - Patient seen and examined at bedside. Patient will be treated with LR bolus. Ordered CBC with differential, CMP, Lipase, and UA to evaluate her symptoms. Patient refused rectal examination during initial examination. The differential diagnoses include but are not limited to: GI bleed v Gastroenteritis     3:57 PM - Patient reevaluated at bedside. Patient has not had any episodes of diarrhea while in the ED. discussed labs and imaging and possible viral illness. Discussed plan for discharge; I advised the patient to follow-up with PCP if her symptoms do not resolve in a week, and to return to the [facility] ED with any new or worsening symptoms. Patient was given the opportunity for questions. I addressed all questions or concerns at this time and they verbalize agreement to the plan of care.     HYDRATION: Based on the patient's presentation of dehydration,  the patient was given IV fluids. IV Hydration was used because oral hydration is unable to be done due to the patient's symptoms. Upon recheck following  hydration, the patient was improved.    Decision Making:  Patient presents for evaluation patient presents complaining of diarrhea weakness and then she stated that it turned black.  The patient did not want have a rectal exam so I ordered for stool cultures and occult stool.  I did give her some fluids because she was feeling extremely nauseated as well as nausea medications.  Laboratory studies show a normal H&H electrolytes are otherwise nonconcerning urinalysis is also normal.  Patient was unable to provide a stool sample so at this point I do not feel the patient does have any significant bleeding is most likely gastroenteritis and she might have blacked secondary to medication she states she has taken some over-the-counter medications.  At this point I feel the patient is stable recommended to continue pushing fluids I will give her Phenergan for nausea control.  If she has any worsening symptoms to include increasing and very frequent black stool she should return back to the emergency department that if she is not taking any over-the-counter diarrhea medications.  Otherwise follow the primary care physician as needed.  I do not feel that this is a GI bleed I do feel is most likely a gastroenteritis.  The patient will return for new or worsening symptoms and is stable at the time of discharge.    The patient is referred to a primary physician for blood pressure management, diabetic screening, and for all other preventative health concerns.      DISPOSITION:  Patient will be discharged home in stable condition.    FOLLOW UP:  TOPHER Clark.  [address] [place] [id number]  [phone]    Schedule an appointment as soon as possible for a visit in 1 week  For re-check, Return if any symptoms worsen      OUTPATIENT MEDICATIONS:  Discharge Medication List as of 4/13/2022  4:09 PM        START taking these medications    Details   promethazine (PHENERGAN) 25 MG Tab Take 1 Tablet by mouth every 6  hours as needed for Nausea/Vomiting., Disp-15 Tablet, R-0, Normal      promethazine (PHENERGAN) 25 MG Suppos Insert 1 Suppository into the rectum every 6 hours as needed for Nausea/Vomiting., Disp-5 Suppository, R-0, Normal              FINAL IMPRESSION  1. Nausea vomiting and diarrhea          IMary (Mary Grace), am scribing for, and in the presence of, Ino Short M.D..    Electronically signed by: Mary Saeed (Mary Grace), 4/13/2022    IIno M.D. personally performed the services described in this documentation, as scribed by Mary Saeed in my presence, and it is both accurate and complete.    The note accurately reflects work and decisions made by me.  Ino Short M.D.  4/13/2022  8:04 PM

## 2022-04-13 NOTE — ED NOTES
PO challenge initiated, pt tolerated Ice chips and apple juice.  Declined solid food at this time.

## 2022-04-13 NOTE — ED TRIAGE NOTES
Patient to ED via EMS from home with complaints of weakness, diarrhea, chills and vomiting x5 days at home. She reports being able to tolerate PO intermittently but as of this am has been having worsening loose stools and abdominal cramping. Received PO zofran by EMS. No recorded fevers.   Reports watery dark stools with streaks of red.     Pt educated on ED process and asked to wait in lobby. Patient educated on importance of alerting staff to new or worsening symptoms or concerns.

## 2022-04-13 NOTE — ED NOTES
Patient ambulated out of bed without assistance. Patient ambulated to restroom slowly with minor assistance; patient utilized railing on the wall for balance.

## 2022-05-18 ENCOUNTER — TELEPHONE (OUTPATIENT)
Dept: MEDICAL GROUP | Facility: MEDICAL CENTER | Age: 62
End: 2022-05-18
Payer: COMMERCIAL

## 2022-05-18 NOTE — TELEPHONE ENCOUNTER
Ray Thomas from [place] 's office called and stated that they have pt's case for a workman's comp claim from 05/2021. Ray was wondering if Margot would be willing to review some records and answer a few questions, since Margot evaluated pt for this injury a few times. HE asked if we would be willing to let them know either way if she's willing to or not. The call back number is [phone], Ray also stated that Margot can also speak with his assistant Jalil. Please advise, thank you

## 2023-01-01 NOTE — PATIENT INSTRUCTIONS
Referral to Physical Therapy   [facility]  [address]   [place] [address]  Phone: [phone]      Referral to Orthopaedics  [facility]   [address] [place] [address]   Phone: [phone]   Fax: [phone]?     
44
48
40

## 2023-05-16 ENCOUNTER — NON-PROVIDER VISIT (OUTPATIENT)
Dept: OCCUPATIONAL MEDICINE | Facility: CLINIC | Age: 63
End: 2023-05-16
Payer: MEDICARE

## 2023-05-16 DIAGNOSIS — Z02.1 PRE-EMPLOYMENT HEALTH SCREENING EXAMINATION: ICD-10-CM

## 2023-05-16 DIAGNOSIS — Z02.1 PRE-EMPLOYMENT DRUG SCREENING: ICD-10-CM

## 2023-05-16 LAB
AMP AMPHETAMINE: NORMAL
COC COCAINE: NORMAL
INT CON NEG: NORMAL
INT CON POS: NORMAL
MET METHAMPHETAMINES: NORMAL
OPI OPIATES: NORMAL
PCP PHENCYCLIDINE: NORMAL
POC DRUG COMMENT 753798-OCCUPATIONAL HEALTH: NORMAL
THC: NORMAL

## 2023-05-16 PROCEDURE — 80305 DRUG TEST PRSMV DIR OPT OBS: CPT | Performed by: PREVENTIVE MEDICINE

## 2023-05-20 ENCOUNTER — APPOINTMENT (OUTPATIENT)
Dept: RADIOLOGY | Facility: MEDICAL CENTER | Age: 63
End: 2023-05-20
Attending: EMERGENCY MEDICINE
Payer: MEDICARE

## 2023-05-20 ENCOUNTER — HOSPITAL ENCOUNTER (EMERGENCY)
Facility: MEDICAL CENTER | Age: 63
End: 2023-05-20
Attending: EMERGENCY MEDICINE
Payer: MEDICARE

## 2023-05-20 VITALS
RESPIRATION RATE: 15 BRPM | OXYGEN SATURATION: 95 % | BODY MASS INDEX: 26.58 KG/M2 | WEIGHT: 150 LBS | SYSTOLIC BLOOD PRESSURE: 150 MMHG | HEIGHT: 63 IN | TEMPERATURE: 98 F | HEART RATE: 68 BPM | DIASTOLIC BLOOD PRESSURE: 67 MMHG

## 2023-05-20 DIAGNOSIS — M54.50 LUMBAR BACK PAIN: ICD-10-CM

## 2023-05-20 DIAGNOSIS — S32.020A COMPRESSION FRACTURE OF L2 VERTEBRA, INITIAL ENCOUNTER (HCC): ICD-10-CM

## 2023-05-20 LAB
ANION GAP SERPL CALC-SCNC: 11 MMOL/L (ref 7–16)
BASOPHILS # BLD AUTO: 0.4 % (ref 0–1.8)
BASOPHILS # BLD: 0.03 K/UL (ref 0–0.12)
BUN SERPL-MCNC: 21 MG/DL (ref 8–22)
CALCIUM SERPL-MCNC: 8.8 MG/DL (ref 8.5–10.5)
CHLORIDE SERPL-SCNC: 103 MMOL/L (ref 96–112)
CO2 SERPL-SCNC: 24 MMOL/L (ref 20–33)
CREAT SERPL-MCNC: 0.79 MG/DL (ref 0.5–1.4)
EOSINOPHIL # BLD AUTO: 0.12 K/UL (ref 0–0.51)
EOSINOPHIL NFR BLD: 1.7 % (ref 0–6.9)
ERYTHROCYTE [DISTWIDTH] IN BLOOD BY AUTOMATED COUNT: 39.8 FL (ref 35.9–50)
GFR SERPLBLD CREATININE-BSD FMLA CKD-EPI: 84 ML/MIN/1.73 M 2
GLUCOSE SERPL-MCNC: 96 MG/DL (ref 65–99)
HCT VFR BLD AUTO: 42.1 % (ref 37–47)
HGB BLD-MCNC: 15.1 G/DL (ref 12–16)
IMM GRANULOCYTES # BLD AUTO: 0.05 K/UL (ref 0–0.11)
IMM GRANULOCYTES NFR BLD AUTO: 0.7 % (ref 0–0.9)
LYMPHOCYTES # BLD AUTO: 2.6 K/UL (ref 1–4.8)
LYMPHOCYTES NFR BLD: 35.8 % (ref 22–41)
MCH RBC QN AUTO: 32.1 PG (ref 27–33)
MCHC RBC AUTO-ENTMCNC: 35.9 G/DL (ref 33.6–35)
MCV RBC AUTO: 89.6 FL (ref 81.4–97.8)
MONOCYTES # BLD AUTO: 0.53 K/UL (ref 0–0.85)
MONOCYTES NFR BLD AUTO: 7.3 % (ref 0–13.4)
NEUTROPHILS # BLD AUTO: 3.94 K/UL (ref 2–7.15)
NEUTROPHILS NFR BLD: 54.1 % (ref 44–72)
NRBC # BLD AUTO: 0 K/UL
NRBC BLD-RTO: 0 /100 WBC
PLATELET # BLD AUTO: 213 K/UL (ref 164–446)
PMV BLD AUTO: 10.3 FL (ref 9–12.9)
POTASSIUM SERPL-SCNC: 3.7 MMOL/L (ref 3.6–5.5)
RBC # BLD AUTO: 4.7 M/UL (ref 4.2–5.4)
SODIUM SERPL-SCNC: 138 MMOL/L (ref 135–145)
WBC # BLD AUTO: 7.3 K/UL (ref 4.8–10.8)

## 2023-05-20 PROCEDURE — 700102 HCHG RX REV CODE 250 W/ 637 OVERRIDE(OP): Performed by: EMERGENCY MEDICINE

## 2023-05-20 PROCEDURE — 96374 THER/PROPH/DIAG INJ IV PUSH: CPT

## 2023-05-20 PROCEDURE — 72131 CT LUMBAR SPINE W/O DYE: CPT

## 2023-05-20 PROCEDURE — 36415 COLL VENOUS BLD VENIPUNCTURE: CPT

## 2023-05-20 PROCEDURE — A9270 NON-COVERED ITEM OR SERVICE: HCPCS | Performed by: EMERGENCY MEDICINE

## 2023-05-20 PROCEDURE — 99285 EMERGENCY DEPT VISIT HI MDM: CPT

## 2023-05-20 PROCEDURE — 700111 HCHG RX REV CODE 636 W/ 250 OVERRIDE (IP): Performed by: EMERGENCY MEDICINE

## 2023-05-20 PROCEDURE — 85025 COMPLETE CBC W/AUTO DIFF WBC: CPT

## 2023-05-20 PROCEDURE — 80048 BASIC METABOLIC PNL TOTAL CA: CPT

## 2023-05-20 RX ORDER — METHYLPREDNISOLONE 4 MG/1
TABLET ORAL
Qty: 21 TABLET | Refills: 0 | Status: SHIPPED | OUTPATIENT
Start: 2023-05-20

## 2023-05-20 RX ORDER — METHOCARBAMOL 500 MG/1
1000 TABLET, FILM COATED ORAL ONCE
Status: COMPLETED | OUTPATIENT
Start: 2023-05-20 | End: 2023-05-20

## 2023-05-20 RX ORDER — METHOCARBAMOL 750 MG/1
750 TABLET, FILM COATED ORAL 3 TIMES DAILY
Qty: 30 TABLET | Refills: 0 | Status: SHIPPED | OUTPATIENT
Start: 2023-05-20 | End: 2023-05-30

## 2023-05-20 RX ADMIN — FENTANYL CITRATE 50 MCG: 50 INJECTION, SOLUTION INTRAMUSCULAR; INTRAVENOUS at 19:14

## 2023-05-20 RX ADMIN — METHOCARBAMOL 1000 MG: 500 TABLET ORAL at 19:13

## 2023-05-20 ASSESSMENT — PAIN DESCRIPTION - PAIN TYPE
TYPE: ACUTE PAIN
TYPE: ACUTE PAIN

## 2023-05-20 ASSESSMENT — FIBROSIS 4 INDEX: FIB4 SCORE: 1.44

## 2023-05-20 NOTE — ED TRIAGE NOTES
"Chief Complaint   Patient presents with    Low Back Pain     Pt lifting a heavy object at home when she felt a \"snap\" from her lower back. Pt immediately felt bilat leg weakness after. Hx of spinal fracture 5 years ago.     Pt received 100mcg fentanyl and 2mg versed per EMS.    /71   Pulse 60   Temp 36.6 °C (97.8 °F) (Temporal)   Resp 18   Ht 1.6 m (5' 3\")   Wt 68 kg (150 lb)   SpO2 93%   BMI 26.57 kg/m²     "

## 2023-05-21 NOTE — ED NOTES
BS report from DONOVAN RN, Pt awaiting to go to CT and pain mgmt  Pure wick in place for pt to void  Call light in reach and on the monitor

## 2023-05-21 NOTE — ED NOTES
Large size TLSO delivered to pt bedside sized and applied to pt. Pt education on usage provided to pt, pt verbalized understanding.     No pt complaints/questions at this time.

## 2023-05-21 NOTE — ED PROVIDER NOTES
"ED Provider Note    CHIEF COMPLAINT  Chief Complaint   Patient presents with    Low Back Pain     Pt lifting a heavy object at home when she felt a \"snap\" from her lower back. Pt immediately felt bilat leg weakness after. Hx of spinal fracture 5 years ago.       EXTERNAL RECORDS REVIEWED  Outpatient Notes telephone encounters regarding patient's occupational Workmen's Comp. claim from May 2021.  No recent ED visits or outpatient notes are able to be reviewed at this time.  CT L spine 2016:  IMPRESSION:  Acute L2 burst fracture with only trace central canal encroachment  Acute L1 slight inferior endplate compression fracture  MRI hip from 2021 shows multiple muscle strains, moderate greater trochanteric bursitis along with acetabular spurring and mild SI joint osteoarthritis and lumbosacral junction facet arthropathies.    HPI/ROS  LIMITATION TO HISTORY   Select: : None  OUTSIDE HISTORIAN(S):      Khalida Delaney is a 62 y.o. female who presents to the emergency room for evaluation of midline lower back discomfort.  Patient was operating a 2 person hold digger with her  earlier this afternoon when opponent being very low to the ground and attempted to extract this from the ground she heard a \"popping sensation\" in her back that cause immediate pain and discomfort.  She has not had any evolving numbness, tingling nor she had any incontinence and has been able to walk though she is having escalating amounts of sharp shooting pains in her back.  She said that she felt weak however she has been able to move her legs that she has been resting in the bed and she was transported via EMS to our facility and received some benzodiazepines in route.  This caused her to breathe shallowly and she was placed on oxygen but she is not normally on oxygen.  She is she says that she is not having any weakness in her legs at the moment.  She denies any other evolving complaints.  She believes that she had fractured her " back in the past and was in rehab with a back brace for prolonged period of time.    Review of the chart shows prior CT imaging showed prior L1 burst fracture and L2 compression fracture.  Did not have surgical intervention for this.    PAST MEDICAL HISTORY   has a past medical history of Arthritis, Back pain, Bipolar 2 disorder, Chronic LBP, Chronic neck pain, Depression, Hep C w/o coma, chronic (HCC), Hepatitis C, HTN (hypertension) (6/2/2016), Melanosis coli (4/20/2017), Shoulder pain, right, Simple cyst of kidney right (6/1/2012), and Tobacco dependence (11/2/2016).    SURGICAL HISTORY   has a past surgical history that includes primary c section and appendectomy.    FAMILY HISTORY  Family History   Problem Relation Age of Onset    Cancer Sister 44        breast and cervical/ovarian    Hypertension Mother     Diabetes Mother     Cancer Mother         bone/brain cancer    No Known Problems Father     Genitourinary () Problems Sister         fibroid tumors    Stroke Maternal Grandmother     Stroke Maternal Grandfather     Heart Disease Maternal Grandfather     Lung Disease Neg Hx     Hyperlipidemia Neg Hx        SOCIAL HISTORY  Social History     Tobacco Use    Smoking status: Former     Years: 35.00     Types: Cigarettes    Smokeless tobacco: Never    Tobacco comments:     pt vapes   Vaping Use    Vaping Use: Every day    Substances: Nicotine    Devices: Pre-filled pod   Substance and Sexual Activity    Alcohol use: No     Comment: used to drink heavily, quit 1998    Drug use: No     Comment: 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 Quit in 2004    Sexual activity: Yes     Partners: Male       CURRENT MEDICATIONS  Home Medications       Reviewed by Eliseo Hernandez R.N. (Registered Nurse) on 05/20/23 at 1653  Med List Status: Partial     Medication Last Dose Status   alprazolam (XANAX) 2 MG tablet  Active   benzonatate (TESSALON) 100 MG Cap  Active   busPIRone (BUSPAR) 30 MG tablet  Active   ferrous sulfate  "325 (65 Fe) MG tablet  Active   ibuprofen (MOTRIN) 800 MG Tab  Active   lamotrigine (LAMICTAL) 150 MG tablet  Active   memantine (NAMENDA) 5 MG Tab  Active   methadone (DOLOPHINE) 10 MG Tab  Active   promethazine (PHENERGAN) 25 MG Suppos  Active   promethazine (PHENERGAN) 25 MG Tab  Active   White Petrolatum-Mineral Oil (SYSTANE NIGHTTIME) Ointment  Active   ziprasidone (GEODON) 80 MG Cap  Active                    ALLERGIES  Allergies   Allergen Reactions    Nkda [No Known Drug Allergy]        PHYSICAL EXAM  VITAL SIGNS: /65   Pulse (!) 49   Temp 36.6 °C (97.8 °F) (Temporal)   Resp 18   Ht 1.6 m (5' 3\")   Wt 68 kg (150 lb)   SpO2 93%   BMI 26.57 kg/m²    Genl: F sitting in gurney uncomfortably, speaking clearly, appears in mild distress  Head: NC/AT   ENT: Mucous membranes moist, posterior pharynx clear, uvula midline, nares patent bilaterally   Pulmonary: Lungs are clear to auscultation bilaterally  CV:  RRR, no murmur appreciated, pulses 2+ in both upper and lower extremities,  Abdomen: soft, NT/ND; no rebound/guarding, no masses palpated, no HSM  : no CVA or suprapubic tenderness   Musculoskeletal: Pain free ROM of the neck. Moving upper extremities and spontaneous in coordinated fashion, lower extremities move spontaneously, referred pain to back limits exam  Neuro: Mental Status: Speech fluent without errors. Follows all commands. No dysarthria or apraxia.  Cranial Nerves: Pupils equal round and reactive to light. Extraocular motion intact. Visual fields intact. No nystagmus. CN V1-V3 intact to light touch. No facial asymmetry. Hearing clinically intact bilaterally. Tongue protrusion midline. No uvular deviation. Normal shoulder shrug and head turn.  Motor:  RUE: 5/5 with hand , 5/5 with flexion at the elbow 5/5 with extension at the elbow  LUE: 5/5 with hand , 5/5 with flexion at the elbow 5/5 with extension at the elbow  Remedy exam is somewhat difficult as the patient has referred " pain with all of these movements of the lower extremity into her back and is somewhat afraid of performing the exam.  She individually engages her quadriceps, her lower leg flexors and extensors and has intact flexion and extension in her bilateral ankles.  There is no limitation to this movement when isolated to the joint itself. Sensation to light touch intact throughout, Reflexes 2+ Patellar tendons. Gait not assessed  Rapidly alternating movements without difficulty      DIAGNOSTIC STUDIES / PROCEDURES    LABS  Labs Reviewed   CBC WITH DIFFERENTIAL - Abnormal; Notable for the following components:       Result Value    MCHC 35.9 (*)     All other components within normal limits   BASIC METABOLIC PANEL   ESTIMATED GFR     RADIOLOGY  I have independently interpreted the diagnostic imaging associated with this visit and am waiting the final reading from the radiologist.   My preliminary interpretation is as follows: Possible chronic versus subacute superior endplate compression fracture noted in the V2 vertebral body.  Radiologist interpretation:   CT-LSPINE W/O PLUS RECONS   Final Result      1.  Moderate butterfly type superior endplate compression fracture of the L2 vertebral body which has worsened slightly since previous exam of 4/5/2016      2.  Osteoporosis.            COURSE & MEDICAL DECISION MAKING    ED Observation Status? No; Patient does not meet criteria for ED Observation.     INITIAL ASSESSMENT, COURSE AND PLAN  Care Narrative: Patient is seen and evaluated for symptoms as described above.  At this time she has a described pop like sensation which would be consistent with possible musculoskeletal tendon injury though with her history of back fracture and the broad area.  Pain I did obtain CT imaging while pain medications and several labs were obtained.  There is no evidence of ALREN, no gross electrolyte derangements or signs of leukocytosis or concerning anemia.  She received Robaxin and small dose  of fentanyl and CT of the L-spine without contrast was obtained.    CT scan came back showing a small moderate type butterfly superior endplate compression fracture of the L2 body which appears slightly worsened from previous exam 7 years ago.  Serial lower extremity exams are performed and her neurovascular status remains intact.  She has not had any other signs of central cord syndrome, impingement in the lower cauda equina syndrome.  She remains afebrile with stable blood pressures and following the results of the CT I have reached out to Dr. Acevedo of spine surgery has patient been previously seen by member of his group.  We discussed the fracture pattern and recommendations are for administration of brace to help with pain but ultimately these injuries improved with physical therapy and is happy to see this patient in the office.  No further advanced medical imaging would be indicated with current clinical symptoms.    Discussed this with the family member and the patient at bedside and they feel comfortable with this plan.  Questions are addressed and they are discharged home in stable condition.    DISPOSITION AND DISCUSSIONS  I have discussed management of the patient with the following physicians and TORRES's:  Dr. Acevedo (Spine)    Discussion of management with other [facility] or appropriate source(s):  none      Escalation of care considered, and ultimately not performed:acute inpatient care management, however at this time, the patient is most appropriate for outpatient management    FINAL DIAGNOSIS  1. Lumbar back pain    2. Compression fracture of L2 vertebra, initial encounter ([facility])      Electronically signed by: Keith Lang M.D., 5/20/2023 5:33 PM

## 2023-05-21 NOTE — ED NOTES
Pt verbalizes she is ready to go home  TLSO brace applied by tech    Pt signed and given d/c papers. Discussed x2 rx sent to [facility] pharmacy. Also discussed f/u w/ neurosurgery- info highlighted and to call Monday morning. Pt denies further questions/concerns. This RN assisted pt to  who was able to slowly stand and pivot to WC w/ assistance. This RN wheeled pt out to exit and assisted into vehicle w/  who is her ride home, all belongings w/ pt. Pt is cleared for d/c and is A&o x4

## 2023-09-26 NOTE — TELEPHONE ENCOUNTER
Detail Level: Detailed Was the patient seen in the last year in this department? Yes     Does patient have an active prescription for medications requested? No     Received Request Via: Pharmacy

## 2024-10-11 ENCOUNTER — APPOINTMENT (OUTPATIENT)
Dept: OCCUPATIONAL MEDICINE | Facility: CLINIC | Age: 64
End: 2024-10-11

## 2024-10-11 DIAGNOSIS — Z02.1 PRE-EMPLOYMENT DRUG SCREENING: ICD-10-CM

## 2024-10-11 PROCEDURE — 80305 DRUG TEST PRSMV DIR OPT OBS: CPT | Performed by: PREVENTIVE MEDICINE

## 2024-11-22 ENCOUNTER — HOSPITAL ENCOUNTER (OUTPATIENT)
Dept: RADIOLOGY | Facility: MEDICAL CENTER | Age: 64
End: 2024-11-22
Attending: NURSE PRACTITIONER
Payer: MEDICARE

## 2024-11-22 DIAGNOSIS — B19.20 HEPATITIS C VIRUS INFECTION, UNSPECIFIED CHRONICITY: ICD-10-CM

## 2025-01-27 ENCOUNTER — NON-PROVIDER VISIT (OUTPATIENT)
Dept: OCCUPATIONAL MEDICINE | Facility: CLINIC | Age: 65
End: 2025-01-27

## 2025-01-27 DIAGNOSIS — Z02.1 PRE-EMPLOYMENT DRUG SCREENING: ICD-10-CM

## 2025-01-27 LAB
AMP AMPHETAMINE: ABNORMAL
COC COCAINE: ABNORMAL
INT CON NEG: ABNORMAL
INT CON POS: ABNORMAL
MET METHAMPHETAMINES: ABNORMAL
OPI OPIATES: ABNORMAL
PCP PHENCYCLIDINE: ABNORMAL
POC DRUG COMMENT 753798-OCCUPATIONAL HEALTH: ABNORMAL
THC: ABNORMAL

## 2025-01-27 PROCEDURE — 80305 DRUG TEST PRSMV DIR OPT OBS: CPT | Performed by: NURSE PRACTITIONER
